# Patient Record
Sex: FEMALE | Race: WHITE | NOT HISPANIC OR LATINO | Employment: FULL TIME | URBAN - METROPOLITAN AREA
[De-identification: names, ages, dates, MRNs, and addresses within clinical notes are randomized per-mention and may not be internally consistent; named-entity substitution may affect disease eponyms.]

---

## 2017-06-13 ENCOUNTER — GENERIC CONVERSION - ENCOUNTER (OUTPATIENT)
Dept: OTHER | Facility: OTHER | Age: 42
End: 2017-06-13

## 2018-04-24 ENCOUNTER — OFFICE VISIT (OUTPATIENT)
Dept: INTERNAL MEDICINE CLINIC | Facility: CLINIC | Age: 43
End: 2018-04-24
Payer: COMMERCIAL

## 2018-04-24 VITALS
RESPIRATION RATE: 16 BRPM | DIASTOLIC BLOOD PRESSURE: 60 MMHG | HEART RATE: 71 BPM | SYSTOLIC BLOOD PRESSURE: 100 MMHG | OXYGEN SATURATION: 99 % | HEIGHT: 60 IN | TEMPERATURE: 100.4 F | WEIGHT: 117 LBS | BODY MASS INDEX: 22.97 KG/M2

## 2018-04-24 DIAGNOSIS — Z13.29 SCREENING FOR THYROID DISORDER: ICD-10-CM

## 2018-04-24 DIAGNOSIS — R10.13 DYSPEPSIA: Primary | ICD-10-CM

## 2018-04-24 DIAGNOSIS — Z13.220 SCREENING, LIPID: ICD-10-CM

## 2018-04-24 PROCEDURE — 99214 OFFICE O/P EST MOD 30 MIN: CPT | Performed by: INTERNAL MEDICINE

## 2018-04-24 RX ORDER — OMEPRAZOLE 20 MG/1
20 CAPSULE, DELAYED RELEASE ORAL DAILY
Qty: 30 CAPSULE | Refills: 0 | Status: SHIPPED | OUTPATIENT
Start: 2018-04-24 | End: 2018-05-21 | Stop reason: ALTCHOICE

## 2018-04-24 RX ORDER — MOMETASONE FUROATE 50 UG/1
1 SPRAY, METERED NASAL DAILY
COMMUNITY
Start: 2016-03-31 | End: 2018-10-02 | Stop reason: ALTCHOICE

## 2018-04-24 NOTE — PROGRESS NOTES
Assessment/Plan:     Diagnoses and all orders for this visit:    Dyspepsia  -     Comprehensive metabolic panel; Future  -     CBC and differential; Future  -     Helicobacter Pylori,IgM; Future  -     omeprazole (PriLOSEC) 20 mg delayed release capsule; Take 1 capsule (20 mg total) by mouth daily    Screening, lipid  -     Lipid panel; Future    Screening for thyroid disorder  -     TSH, 3rd generation; Future    Other orders  -     mometasone (NASONEX) 50 mcg/act nasal spray; 1 spray into each nostril daily  -     pramoxine-hydrocortisone (ANALPRAM-HC) 1-1 % rectal cream;       Tasha Goodman was seen and examined in the office today  Please see HPI for details  She may have picked up something but continues with feelings of dyspepsia  I am going to have her take Omeprazole daily for about 4 weeks  Of note, she did have some relief with Zantac and Ranitidine as well  She does have a family history of colon cancer but fortunately had a Cscope in the last 3 years  She is also going to complete blood work as listed above  Otherwise no other changes were made  Subjective:      Patient ID: Madelyn Pereira is a 40 y o  female  Tasha Goodman is here today to discuss abdominal symptoms that started last week  To summarize, she reports symptoms started off with not feeling well overall as well as constipation  It led to one day of diffuse diarrhea as well as vomiting  That resolved but was left with waves of nausea, reflux symptoms, and bowels are irregular still  She reports no recent travel but was at a pediatrician's office and was not sure if she picked up something then  She denies significant NSAID use or other related complaints  See below  Abdominal Pain   This is a new problem  The current episode started in the past 7 days  The pain is located in the generalized abdominal region  The quality of the pain is burning  The abdominal pain radiates to the epigastric region   Associated symptoms include belching, constipation, diarrhea, nausea and vomiting  Pertinent negatives include no dysuria, fever, headaches, hematochezia, hematuria or melena  The pain is aggravated by belching and eating  She has tried H2 blockers for the symptoms  The treatment provided mild relief  There is no history of abdominal surgery, colon cancer, Crohn's disease, gallstones, GERD, irritable bowel syndrome, pancreatitis, PUD or ulcerative colitis  The following portions of the patient's history were reviewed and updated as appropriate: allergies, current medications, past family history, past medical history, past social history, past surgical history and problem list     Review of Systems   Constitutional: Negative for fever  Gastrointestinal: Positive for abdominal pain, constipation, diarrhea, nausea and vomiting  Negative for hematochezia and melena  Genitourinary: Negative for dysuria and hematuria  Neurological: Negative for headaches  Objective:      /60 (BP Location: Left arm, Patient Position: Sitting, Cuff Size: Adult)   Pulse 71   Temp 100 4 °F (38 °C) (Tympanic)   Resp 16   Ht 5' (1 524 m)   Wt 53 1 kg (117 lb)   SpO2 99%   BMI 22 85 kg/m²          Physical Exam   Constitutional: She is oriented to person, place, and time  She appears well-developed and well-nourished  HENT:   Head: Normocephalic and atraumatic  Eyes: Conjunctivae and EOM are normal    Neck: Neck supple  Cardiovascular: Normal rate, regular rhythm and normal heart sounds  No murmur heard  Pulmonary/Chest: Breath sounds normal  No respiratory distress  She exhibits no tenderness  Abdominal: Soft  Bowel sounds are normal  She exhibits no distension and no mass  There is no rebound and no guarding  Musculoskeletal: Normal range of motion  She exhibits no edema  Lymphadenopathy:     She has no cervical adenopathy  Neurological: She is alert and oriented to person, place, and time  Skin: Skin is warm and dry     Psychiatric: She has a normal mood and affect  Her behavior is normal  Judgment and thought content normal    Vitals reviewed

## 2018-04-25 ENCOUNTER — TELEPHONE (OUTPATIENT)
Dept: INTERNAL MEDICINE CLINIC | Facility: CLINIC | Age: 43
End: 2018-04-25

## 2018-04-25 DIAGNOSIS — R10.13 DYSPEPSIA: Primary | ICD-10-CM

## 2018-04-25 NOTE — TELEPHONE ENCOUNTER
Left message on patients voice mail regarding lab slip  Advised patient to return phone call regarding fax number to make sure it's a secure number

## 2018-05-02 ENCOUNTER — TELEPHONE (OUTPATIENT)
Dept: INTERNAL MEDICINE CLINIC | Facility: CLINIC | Age: 43
End: 2018-05-02

## 2018-05-02 NOTE — TELEPHONE ENCOUNTER
I did not see anything either  I will contact the lab tomorrow for the report  Patient stated that they are on my chart

## 2018-05-02 NOTE — TELEPHONE ENCOUNTER
Patient called stating that she seen her blood work on my chart, she would like to know the results  Patient states that her labs show that her lipids are high and some of her other test are low  Please advise  Thanks  Patient has appt with the GI doctor next week because she still has her symptoms

## 2018-05-21 ENCOUNTER — OFFICE VISIT (OUTPATIENT)
Dept: INTERNAL MEDICINE CLINIC | Facility: CLINIC | Age: 43
End: 2018-05-21
Payer: COMMERCIAL

## 2018-05-21 VITALS
SYSTOLIC BLOOD PRESSURE: 98 MMHG | TEMPERATURE: 99 F | HEIGHT: 60 IN | HEART RATE: 78 BPM | DIASTOLIC BLOOD PRESSURE: 60 MMHG | BODY MASS INDEX: 22.62 KG/M2 | OXYGEN SATURATION: 98 % | WEIGHT: 115.2 LBS | RESPIRATION RATE: 16 BRPM

## 2018-05-21 DIAGNOSIS — M54.50 ACUTE BILATERAL LOW BACK PAIN WITHOUT SCIATICA: Primary | ICD-10-CM

## 2018-05-21 PROCEDURE — 99213 OFFICE O/P EST LOW 20 MIN: CPT | Performed by: INTERNAL MEDICINE

## 2018-05-21 RX ORDER — METHOCARBAMOL 750 MG/1
750 TABLET, FILM COATED ORAL 3 TIMES DAILY
COMMUNITY
Start: 2018-05-20 | End: 2018-09-13 | Stop reason: ALTCHOICE

## 2018-05-21 RX ORDER — LIDOCAINE 50 MG/G
1 PATCH TOPICAL EVERY 24 HOURS
COMMUNITY
Start: 2018-05-20 | End: 2018-09-17

## 2018-05-21 RX ORDER — HYDROCORTISONE ACETATE 25 MG/1
SUPPOSITORY RECTAL
COMMUNITY
Start: 2018-02-01 | End: 2019-04-30

## 2018-05-21 RX ORDER — NORETHINDRONE ACETATE AND ETHINYL ESTRADIOL 1MG-20(21)
1 KIT ORAL
COMMUNITY
Start: 2018-05-01 | End: 2018-09-10 | Stop reason: ALTCHOICE

## 2018-05-21 NOTE — PROGRESS NOTES
Assessment/Plan:     Diagnoses and all orders for this visit:    Acute bilateral low back pain without sciatica    Other orders  -     norethindrone-ethinyl estradiol (JUNEL FE 1/20) 1-20 MG-MCG per tablet; Take 1 tablet by mouth  -     methocarbamol (ROBAXIN) 750 mg tablet; Take 750 mg by mouth Three times a day  -     hydrocortisone (ANUCORT-HC) 25 mg suppository;   -     lidocaine (LIDODERM) 5 %; Place 1 patch on the skin every 24 hours      Regla Manuel was seen and examined in the office today  IN terms of her back pain, she does not have worrisome symptoms  Straight leg test was negative  She does have some noted bogginess of the musculature  For the next 5 days, I asked she take Ibuprofen 2-3 x per day with food and Robaxin at bedtime  Continue with heat  If symptoms worsen or do not improve, I asked that she let me know  Otherwise no other changes were made  Subjective:      Patient ID: Sharonda Hathaway is a 40 y o  female  Regla Manuel is here today for back pain that started about 10 days ago  She reports typically it disappears after a few days  She reports noticing the back pain more prominent on the right side but can be present bilaterally  She did try Tylenol and Ibuprofen but has not been particularly consistent  She also reports traveling to [place] recently and did go on rides that jolted her  She also did ambulate a great deal but typically did okay during the day  She also reports going to the chiropractor  Back Pain   This is a new problem  The current episode started in the past 7 days  The problem occurs daily  The problem is unchanged  The pain is present in the lumbar spine, thoracic spine and gluteal  The pain radiates to the right thigh  The pain is worse during the day  The symptoms are aggravated by sitting (Anything doing too long)  Associated symptoms include numbness   Pertinent negatives include no abdominal pain, bladder incontinence, bowel incontinence, fever, headaches or tingling  She has tried NSAIDs, chiropractic manipulation and analgesics for the symptoms  The following portions of the patient's history were reviewed and updated as appropriate: allergies, current medications, past family history, past medical history, past social history, past surgical history and problem list     Review of Systems   Constitutional: Negative for fever  Gastrointestinal: Negative for abdominal pain and bowel incontinence  Genitourinary: Negative for bladder incontinence  Musculoskeletal: Positive for back pain  Neurological: Positive for numbness  Negative for tingling and headaches  Objective:      BP 98/60 (BP Location: Left arm, Patient Position: Sitting, Cuff Size: Adult)   Pulse 78   Temp 99 °F (37 2 °C) (Tympanic)   Resp 16   Ht 5' (1 524 m)   Wt 52 3 kg (115 lb 3 2 oz)   SpO2 98%   BMI 22 50 kg/m²          Physical Exam   Constitutional: She is oriented to person, place, and time  She appears well-developed and well-nourished  Pulmonary/Chest: Effort normal    Musculoskeletal: She exhibits tenderness  Lumbar back: She exhibits tenderness, edema and spasm  She exhibits no deformity and no laceration  Neurological: She is alert and oriented to person, place, and time  Skin: Skin is warm and dry  Psychiatric: She has a normal mood and affect  Her behavior is normal  Thought content normal    Vitals reviewed

## 2018-05-22 ENCOUNTER — TELEPHONE (OUTPATIENT)
Dept: INTERNAL MEDICINE CLINIC | Facility: CLINIC | Age: 43
End: 2018-05-22

## 2018-05-22 NOTE — TELEPHONE ENCOUNTER
Notified patient with providers instructions  Patient states that she is not having any symptoms  Advised patient to call our office if anything changes

## 2018-05-22 NOTE — TELEPHONE ENCOUNTER
Patient called stating that her urine results came in from [facility], she would like to know if you could please review them  Patients results are under care everywhere  If antibiotic needs to be called in please use [facility]   Thank you

## 2018-09-10 ENCOUNTER — OFFICE VISIT (OUTPATIENT)
Dept: INTERNAL MEDICINE CLINIC | Facility: CLINIC | Age: 43
End: 2018-09-10
Payer: COMMERCIAL

## 2018-09-10 VITALS
OXYGEN SATURATION: 97 % | HEIGHT: 60 IN | WEIGHT: 118 LBS | HEART RATE: 80 BPM | BODY MASS INDEX: 23.16 KG/M2 | SYSTOLIC BLOOD PRESSURE: 110 MMHG | DIASTOLIC BLOOD PRESSURE: 70 MMHG

## 2018-09-10 DIAGNOSIS — J30.9 ALLERGIC RHINITIS, UNSPECIFIED SEASONALITY, UNSPECIFIED TRIGGER: ICD-10-CM

## 2018-09-10 DIAGNOSIS — G44.89 OTHER HEADACHE SYNDROME: ICD-10-CM

## 2018-09-10 DIAGNOSIS — F41.1 GENERALIZED ANXIETY DISORDER: Primary | ICD-10-CM

## 2018-09-10 PROBLEM — M54.50 ACUTE BILATERAL LOW BACK PAIN WITHOUT SCIATICA: Status: RESOLVED | Noted: 2018-05-21 | Resolved: 2018-09-10

## 2018-09-10 PROCEDURE — 99213 OFFICE O/P EST LOW 20 MIN: CPT | Performed by: INTERNAL MEDICINE

## 2018-09-10 RX ORDER — CURCUMIN 100 %
1 POWDER (GRAM) MISCELLANEOUS DAILY
COMMUNITY
End: 2021-04-29

## 2018-09-10 RX ORDER — OMEPRAZOLE 20 MG/1
20 CAPSULE, DELAYED RELEASE ORAL DAILY
COMMUNITY
End: 2019-04-30

## 2018-09-10 RX ORDER — LORAZEPAM 0.5 MG/1
0.5 TABLET ORAL DAILY PRN
Qty: 15 TABLET | Refills: 0 | Status: SHIPPED | OUTPATIENT
Start: 2018-09-10 | End: 2018-09-28 | Stop reason: SDUPTHER

## 2018-09-10 NOTE — PROGRESS NOTES
Assessment/Plan:     Diagnoses and all orders for this visit:    Generalized anxiety disorder  -     LORazepam (ATIVAN) 0 5 mg tablet; Take 1 tablet (0 5 mg total) by mouth daily as needed for anxiety    Allergic rhinitis, unspecified seasonality, unspecified trigger    Other headache syndrome    Other orders  -     omeprazole (PriLOSEC) 20 mg delayed release capsule; Take 20 mg by mouth daily  -     fexofenadine (ALLEGRA) 30 MG tablet; Take 30 mg by mouth 2 (two) times a day  -     Probiotic Product (PROBIOTIC-10 PO); Take by mouth  -     Turmeric, Curcuma [place]a, ([place]) POWD; by Does not apply route      Daniel Dao was seen and examined in the office today  Examination was largely within normal limits  Recent imaging was reviewed as well  Her headache and symptoms may be multifactorial  She is going to try massage therapy and also see if counseling helps her underlying anxiety  She was also given a script for Ativan to hang onto  She has had this previously in the past   She is going to see if the Medrol dose helps any of her symtpoms  We did discuss if this was possibly allergy related, we could possibly try other options  Otherwise no other changes were made  Subjective:      Patient ID: Shadi Buitrago is a 40 y o  female  Daniel Dao is here today to discuss some on going recent symptoms  She reports that she had noticed right sided head pressure and symptoms  This prompted a visit to an urgent care and was diagnosed with an ear infection  She completed her antibiotic course but continued with right sided head pressure and shooting pain at times  She reports previous headaches but this was different  She was unsure if this was allergy or sinus related  She denies hearing loss, persistent N/V, or lightheadedness  She does report feeling ill the day of the ED visit with associated numbness/tingling  She feels it may have been related to her anxiety as well    She continues to feel the head pressure on the right with dental pain as well as pain with chewing at times  She was seen by ENT this morning as well  She was started on a Medrol dose pack  The following portions of the patient's history were reviewed and updated as appropriate: allergies, current medications, past family history, past medical history, past social history, past surgical history and problem list     Review of Systems   Constitutional: Positive for fatigue  Negative for fever and unexpected weight change  HENT: Positive for sinus pain and sinus pressure  Negative for trouble swallowing and voice change  Respiratory: Negative for cough, shortness of breath, wheezing and stridor  Cardiovascular: Negative for chest pain, palpitations and leg swelling  Gastrointestinal: Negative for abdominal pain, diarrhea, nausea and vomiting  Musculoskeletal: Negative for arthralgias  Neurological: Positive for headaches  Negative for syncope  Psychiatric/Behavioral: Negative for confusion and dysphoric mood  The patient is nervous/anxious  Objective:      /70 (BP Location: Left arm, Patient Position: Sitting, Cuff Size: Standard)   Pulse 80   Ht 5' (1 524 m)   Wt 53 5 kg (118 lb)   SpO2 97%   BMI 23 05 kg/m²          Physical Exam   Constitutional: She is oriented to person, place, and time  She appears well-developed and well-nourished  No distress  HENT:   Head: Normocephalic and atraumatic  Mouth/Throat: Oropharynx is clear and moist    Eyes: Conjunctivae and EOM are normal  Pupils are equal, round, and reactive to light  Right eye exhibits no discharge  Left eye exhibits no discharge  No scleral icterus  Neck: Normal range of motion  No thyromegaly present  Cardiovascular: Normal rate, regular rhythm and normal heart sounds  No murmur heard  Pulmonary/Chest: Effort normal and breath sounds normal  No respiratory distress  She has no wheezes  Abdominal: Soft  Bowel sounds are normal  There is no tenderness   There is no rebound  Musculoskeletal: Normal range of motion  She exhibits no edema  Lymphadenopathy:     She has no cervical adenopathy  Neurological: She is alert and oriented to person, place, and time  She has normal reflexes  No cranial nerve deficit  Skin: Skin is warm and dry  She is not diaphoretic  No erythema  Psychiatric: She has a normal mood and affect  Her speech is normal and behavior is normal  Judgment and thought content normal    Vitals reviewed

## 2018-09-12 ENCOUNTER — TELEPHONE (OUTPATIENT)
Dept: INTERNAL MEDICINE CLINIC | Facility: CLINIC | Age: 43
End: 2018-09-12

## 2018-09-12 NOTE — TELEPHONE ENCOUNTER
Pt started steroids for past 3 days  Headaches continue to point of interfering with activities  PT still in pain and frustrated   Would appreciate a call for advice prior to order additional meds  [phone]   TY

## 2018-09-13 DIAGNOSIS — J30.9 ALLERGIC RHINITIS, UNSPECIFIED SEASONALITY, UNSPECIFIED TRIGGER: Primary | ICD-10-CM

## 2018-09-13 RX ORDER — MONTELUKAST SODIUM 10 MG/1
10 TABLET ORAL
Qty: 30 TABLET | Refills: 1 | Status: SHIPPED | OUTPATIENT
Start: 2018-09-13 | End: 2018-10-02 | Stop reason: ALTCHOICE

## 2018-09-13 NOTE — TELEPHONE ENCOUNTER
Kirk Marquez called back saying she is returning a call from you  She asked if you can call her back at your convienance at [phone]

## 2018-09-14 ENCOUNTER — TELEPHONE (OUTPATIENT)
Dept: INTERNAL MEDICINE CLINIC | Facility: CLINIC | Age: 43
End: 2018-09-14

## 2018-09-14 NOTE — TELEPHONE ENCOUNTER
That is fine if she would like to start off with ortho and if need be, we can send to neuro too after

## 2018-09-14 NOTE — TELEPHONE ENCOUNTER
Gabe Dial called back and states she spoke with you last night and wants to let you know what she has decided in regards to next step with her headaches   she decided she will see her spine specialist at 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 And said she has seen Dr Pat Grace there in the past and wants to know if you are okay with her seeing him or should she see a neurologist? I told her she may not receive a call back until Monday from you  She can be reached at [phone]

## 2018-09-17 ENCOUNTER — TELEPHONE (OUTPATIENT)
Dept: INTERNAL MEDICINE CLINIC | Facility: CLINIC | Age: 43
End: 2018-09-17

## 2018-09-17 ENCOUNTER — OFFICE VISIT (OUTPATIENT)
Dept: INTERNAL MEDICINE CLINIC | Facility: CLINIC | Age: 43
End: 2018-09-17
Payer: COMMERCIAL

## 2018-09-17 ENCOUNTER — APPOINTMENT (OUTPATIENT)
Dept: LAB | Facility: CLINIC | Age: 43
End: 2018-09-17
Payer: COMMERCIAL

## 2018-09-17 VITALS
HEIGHT: 60 IN | OXYGEN SATURATION: 98 % | HEART RATE: 82 BPM | BODY MASS INDEX: 22.38 KG/M2 | WEIGHT: 114 LBS | SYSTOLIC BLOOD PRESSURE: 110 MMHG | DIASTOLIC BLOOD PRESSURE: 68 MMHG

## 2018-09-17 DIAGNOSIS — G44.89 OTHER HEADACHE SYNDROME: Primary | ICD-10-CM

## 2018-09-17 DIAGNOSIS — F41.1 GENERALIZED ANXIETY DISORDER: ICD-10-CM

## 2018-09-17 DIAGNOSIS — G44.89 OTHER HEADACHE SYNDROME: ICD-10-CM

## 2018-09-17 DIAGNOSIS — H93.13 TINNITUS OF BOTH EARS: ICD-10-CM

## 2018-09-17 LAB
ALBUMIN SERPL BCP-MCNC: 4.2 G/DL (ref 3.5–5)
ALP SERPL-CCNC: 33 U/L (ref 46–116)
ALT SERPL W P-5'-P-CCNC: 23 U/L (ref 12–78)
ANION GAP SERPL CALCULATED.3IONS-SCNC: 6 MMOL/L (ref 4–13)
AST SERPL W P-5'-P-CCNC: 13 U/L (ref 5–45)
BASOPHILS # BLD AUTO: 0.04 THOUSANDS/ΜL (ref 0–0.1)
BASOPHILS NFR BLD AUTO: 1 % (ref 0–1)
BILIRUB SERPL-MCNC: 0.46 MG/DL (ref 0.2–1)
BUN SERPL-MCNC: 9 MG/DL (ref 5–25)
CALCIUM SERPL-MCNC: 9 MG/DL (ref 8.3–10.1)
CHLORIDE SERPL-SCNC: 101 MMOL/L (ref 100–108)
CO2 SERPL-SCNC: 27 MMOL/L (ref 21–32)
CREAT SERPL-MCNC: 0.89 MG/DL (ref 0.6–1.3)
EOSINOPHIL # BLD AUTO: 0.07 THOUSAND/ΜL (ref 0–0.61)
EOSINOPHIL NFR BLD AUTO: 1 % (ref 0–6)
ERYTHROCYTE [DISTWIDTH] IN BLOOD BY AUTOMATED COUNT: 12 % (ref 11.6–15.1)
ERYTHROCYTE [SEDIMENTATION RATE] IN BLOOD: 12 MM/HOUR (ref 0–20)
GFR SERPL CREATININE-BSD FRML MDRD: 81 ML/MIN/1.73SQ M
GLUCOSE SERPL-MCNC: 98 MG/DL (ref 65–140)
HCT VFR BLD AUTO: 39.7 % (ref 34.8–46.1)
HGB BLD-MCNC: 13.1 G/DL (ref 11.5–15.4)
IMM GRANULOCYTES # BLD AUTO: 0.01 THOUSAND/UL (ref 0–0.2)
IMM GRANULOCYTES NFR BLD AUTO: 0 % (ref 0–2)
LYMPHOCYTES # BLD AUTO: 1.74 THOUSANDS/ΜL (ref 0.6–4.47)
LYMPHOCYTES NFR BLD AUTO: 34 % (ref 14–44)
MCH RBC QN AUTO: 31.2 PG (ref 26.8–34.3)
MCHC RBC AUTO-ENTMCNC: 33 G/DL (ref 31.4–37.4)
MCV RBC AUTO: 95 FL (ref 82–98)
MONOCYTES # BLD AUTO: 0.58 THOUSAND/ΜL (ref 0.17–1.22)
MONOCYTES NFR BLD AUTO: 11 % (ref 4–12)
NEUTROPHILS # BLD AUTO: 2.75 THOUSANDS/ΜL (ref 1.85–7.62)
NEUTS SEG NFR BLD AUTO: 53 % (ref 43–75)
NRBC BLD AUTO-RTO: 0 /100 WBCS
PLATELET # BLD AUTO: 307 THOUSANDS/UL (ref 149–390)
PMV BLD AUTO: 10.4 FL (ref 8.9–12.7)
POTASSIUM SERPL-SCNC: 4 MMOL/L (ref 3.5–5.3)
PROT SERPL-MCNC: 7.7 G/DL (ref 6.4–8.2)
RBC # BLD AUTO: 4.2 MILLION/UL (ref 3.81–5.12)
SODIUM SERPL-SCNC: 134 MMOL/L (ref 136–145)
TSH SERPL DL<=0.05 MIU/L-ACNC: 1.91 UIU/ML (ref 0.36–3.74)
WBC # BLD AUTO: 5.19 THOUSAND/UL (ref 4.31–10.16)

## 2018-09-17 PROCEDURE — 80053 COMPREHEN METABOLIC PANEL: CPT | Performed by: INTERNAL MEDICINE

## 2018-09-17 PROCEDURE — 85025 COMPLETE CBC W/AUTO DIFF WBC: CPT | Performed by: INTERNAL MEDICINE

## 2018-09-17 PROCEDURE — 84443 ASSAY THYROID STIM HORMONE: CPT | Performed by: INTERNAL MEDICINE

## 2018-09-17 PROCEDURE — 36415 COLL VENOUS BLD VENIPUNCTURE: CPT | Performed by: INTERNAL MEDICINE

## 2018-09-17 PROCEDURE — 85652 RBC SED RATE AUTOMATED: CPT

## 2018-09-17 PROCEDURE — 99213 OFFICE O/P EST LOW 20 MIN: CPT | Performed by: INTERNAL MEDICINE

## 2018-09-17 PROCEDURE — 3008F BODY MASS INDEX DOCD: CPT | Performed by: INTERNAL MEDICINE

## 2018-09-17 RX ORDER — METRONIDAZOLE 7.5 MG/G
GEL VAGINAL
COMMUNITY
Start: 2018-07-30 | End: 2018-10-02 | Stop reason: ALTCHOICE

## 2018-09-17 RX ORDER — METHOCARBAMOL 750 MG/1
TABLET, FILM COATED ORAL
COMMUNITY
Start: 2018-09-15 | End: 2018-10-02 | Stop reason: ALTCHOICE

## 2018-09-17 RX ORDER — AMITRIPTYLINE HYDROCHLORIDE 10 MG/1
10 TABLET, FILM COATED ORAL
Qty: 30 TABLET | Refills: 0 | Status: SHIPPED | OUTPATIENT
Start: 2018-09-17 | End: 2018-10-02 | Stop reason: SDUPTHER

## 2018-09-17 RX ORDER — NAPROXEN 500 MG/1
TABLET ORAL
COMMUNITY
Start: 2018-09-15 | End: 2020-10-09

## 2018-09-17 NOTE — PROGRESS NOTES
Assessment/Plan:     Diagnoses and all orders for this visit:    Other headache syndrome  -     MRI brain w wo mra head wo mra neck w wo; Future  -     Comprehensive metabolic panel  -     CBC and differential  -     TSH, 3rd generation with Free T4 reflex  -     Sedimentation rate, automated; Future  -     amitriptyline (ELAVIL) 10 mg tablet; Take 1 tablet (10 mg total) by mouth daily at bedtime    Generalized anxiety disorder    Tinnitus of both ears    Other orders  -     methocarbamol (ROBAXIN) 750 mg tablet;   -     metroNIDAZOLE (METROGEL) 0 75 % vaginal gel;   -     naproxen (NAPROSYN) 500 mg tablet;       Daneil Dao was seen and examined in the office today  On exam, she notes subjective numbness of the right face  She has had worsening headaches and I did order follow up testing (also has tinnitus)  Neck was included as well  We also discussed possibly doing a trial of Elavil and this was sent  Will follow  Subjective:      Patient ID: Shadi Buitrago is a 40 y o  female  Daniel Dao is here today with continuing headaches that are worsening  Please see previous notes for details  They have led to 2 ED visits, last of which was over the weekend  She reports worsening right sided neck/head pressure that radiates to the front  She feels dental pressure and pain  She feels numbness on this side overall  At times, she notes N/T of the arms but will feel it bilaterally  She has tried multiple things and recently had a trigger point in the ED  She felt some relief in the neck but symptoms returned  She reports daily headaches that seem to get worse as the day progresses  She is feeling much anxiety secondary to this as this is not normal for her  Migraine    This is a new problem  The current episode started 1 to 4 weeks ago  The problem occurs daily  The problem has been gradually worsening  The pain is located in the right unilateral region  The pain radiates to the right neck   The pain quality is not similar to prior headaches  The quality of the pain is described as stabbing and throbbing  Associated symptoms include anorexia, neck pain, numbness, tingling and tinnitus  Pertinent negatives include no abdominal pain, abnormal behavior, back pain, blurred vision, coughing, dizziness, drainage, ear pain, eye pain, eye redness, eye watering, facial sweating, fever, hearing loss, muscle aches, nausea, scalp tenderness, sinus pressure, sore throat or weight loss  The symptoms are aggravated by unknown  She has tried NSAIDs for the symptoms  The treatment provided mild relief  Her past medical history is significant for migraine headaches  There is no history of cancer, cluster headaches, hypertension, recent head traumas or TMJ  The following portions of the patient's history were reviewed and updated as appropriate: allergies, current medications, past family history, past medical history, past social history, past surgical history and problem list     Review of Systems   Constitutional: Negative for fever and weight loss  HENT: Positive for tinnitus  Negative for ear pain, hearing loss, sinus pressure and sore throat  Eyes: Negative for blurred vision, pain and redness  Respiratory: Negative for cough  Gastrointestinal: Positive for anorexia  Negative for abdominal pain and nausea  Musculoskeletal: Positive for neck pain  Negative for back pain  Neurological: Positive for tingling and numbness  Negative for dizziness  Objective:      /68 (BP Location: Left arm, Patient Position: Sitting, Cuff Size: Standard)   Pulse 82   Ht 5' (1 524 m)   Wt 51 7 kg (114 lb)   SpO2 98%   BMI 22 26 kg/m²          Physical Exam   Constitutional: She is oriented to person, place, and time  She appears well-developed and well-nourished  No distress  HENT:   Head: Normocephalic and atraumatic     Right Ear: External ear normal    Left Ear: External ear normal    Mouth/Throat: Oropharynx is clear and moist    Eyes: Conjunctivae and EOM are normal  Pupils are equal, round, and reactive to light  Right eye exhibits no discharge  Left eye exhibits no discharge  Neck: Normal range of motion  Neck supple  No JVD present  No tracheal deviation present  No thyromegaly present  Cardiovascular: Normal rate, regular rhythm and normal heart sounds  Pulmonary/Chest: Effort normal and breath sounds normal  No respiratory distress  She has no wheezes  Musculoskeletal: Normal range of motion  Lymphadenopathy:     She has no cervical adenopathy  Neurological: She is alert and oriented to person, place, and time  She has normal reflexes  No cranial nerve deficit  Skin: Skin is warm and dry  She is not diaphoretic  Psychiatric: She has a normal mood and affect  Her speech is normal and behavior is normal  Judgment and thought content normal    Vitals reviewed

## 2018-09-17 NOTE — TELEPHONE ENCOUNTER
PT Forgot to mention that she needs a PCP note to her employer recommending the need for a stand-up desk and ergonomic chair for medical reasons   Call when available or e-mail

## 2018-09-18 ENCOUNTER — TELEPHONE (OUTPATIENT)
Dept: INTERNAL MEDICINE CLINIC | Facility: CLINIC | Age: 43
End: 2018-09-18

## 2018-09-18 NOTE — TELEPHONE ENCOUNTER
Could you print something like this for me on letterhead? And then we can let Patricia Abebe know we have it done  To Whom It May Concern,    Erikagaelmer Rich is under my medical care  I believe it is in her best interest to have a stand-up desk and ergonomic chair for medical reasons  If there are any questions or concerns, please do not hesitate to call  Sincerely,      Cristiano ARORA

## 2018-09-18 NOTE — TELEPHONE ENCOUNTER
----- Message from Jada Reynaga DO sent at 9/18/2018  1:54 PM EDT -----  Could you let Regla Kaleb know that her blood work is fine

## 2018-09-19 ENCOUNTER — TELEPHONE (OUTPATIENT)
Dept: INTERNAL MEDICINE CLINIC | Facility: CLINIC | Age: 43
End: 2018-09-19

## 2018-09-19 DIAGNOSIS — M54.10 RADICULOPATHY, UNSPECIFIED SPINAL REGION: Primary | ICD-10-CM

## 2018-09-19 NOTE — TELEPHONE ENCOUNTER
Mel Lancaster is asking for a call back  She was looking at her lab results and noticed some things were off and she would like to discuss  She is also checking the status of her MRI and wanted to know if it was approved

## 2018-09-20 ENCOUNTER — TELEPHONE (OUTPATIENT)
Dept: INTERNAL MEDICINE CLINIC | Facility: CLINIC | Age: 43
End: 2018-09-20

## 2018-09-20 ENCOUNTER — APPOINTMENT (OUTPATIENT)
Dept: RADIOLOGY | Facility: MEDICAL CENTER | Age: 43
End: 2018-09-20
Payer: COMMERCIAL

## 2018-09-20 DIAGNOSIS — M54.10 RADICULOPATHY, UNSPECIFIED SPINAL REGION: ICD-10-CM

## 2018-09-20 PROCEDURE — 72050 X-RAY EXAM NECK SPINE 4/5VWS: CPT

## 2018-09-20 NOTE — TELEPHONE ENCOUNTER
Ok thanks  Can we make sure that its not just the vasculature and they cover the bony structures too? (ie herniated discs)- sorry if you already meant that!

## 2018-09-20 NOTE — TELEPHONE ENCOUNTER
----- Message from Kenyetta Kiser DO sent at 9/20/2018 11:20 AM EDT -----  Could you let Ansley Westbrook know that her xray looks generally okay  Nothing worrisome on this

## 2018-09-20 NOTE — TELEPHONE ENCOUNTER
Patient wanted to note tat she was recently treated for an ear infection   She was told she had a lot of fluid build up be

## 2018-09-21 DIAGNOSIS — M54.12 CERVICAL RADICULOPATHY: Primary | ICD-10-CM

## 2018-09-21 NOTE — TELEPHONE ENCOUNTER
[address] but I ended up putting in  Mri cervical neck too  Her symptoms seem to be involving her neck too woth radiation down her arm   If her test does not get approved, I would take our mra

## 2018-09-25 DIAGNOSIS — M54.12 RADICULOPATHY, CERVICAL REGION: ICD-10-CM

## 2018-09-25 DIAGNOSIS — R51.9 INTRACTABLE HEADACHE, UNSPECIFIED CHRONICITY PATTERN, UNSPECIFIED HEADACHE TYPE: Primary | ICD-10-CM

## 2018-09-28 DIAGNOSIS — F41.1 GENERALIZED ANXIETY DISORDER: ICD-10-CM

## 2018-09-28 RX ORDER — LORAZEPAM 0.5 MG/1
0.5 TABLET ORAL DAILY PRN
Qty: 15 TABLET | Refills: 0 | Status: SHIPPED | OUTPATIENT
Start: 2018-09-28 | End: 2019-04-24 | Stop reason: SDUPTHER

## 2018-09-28 NOTE — TELEPHONE ENCOUNTER
Patient is requesting a refill on lorazepam  She said with her headaches this is the only thing that helps her sleep  Patient received results for MRI  I have it in your folder for review  Patiet would like to regroup  She said she can come in for an appointment or a phone call   Which ever work for you

## 2018-10-02 ENCOUNTER — LAB (OUTPATIENT)
Dept: LAB | Facility: CLINIC | Age: 43
End: 2018-10-02
Payer: COMMERCIAL

## 2018-10-02 ENCOUNTER — OFFICE VISIT (OUTPATIENT)
Dept: INTERNAL MEDICINE CLINIC | Facility: CLINIC | Age: 43
End: 2018-10-02
Payer: COMMERCIAL

## 2018-10-02 VITALS
BODY MASS INDEX: 21.99 KG/M2 | SYSTOLIC BLOOD PRESSURE: 118 MMHG | HEART RATE: 92 BPM | WEIGHT: 112 LBS | HEIGHT: 60 IN | DIASTOLIC BLOOD PRESSURE: 72 MMHG | OXYGEN SATURATION: 98 %

## 2018-10-02 DIAGNOSIS — G44.89 OTHER HEADACHE SYNDROME: ICD-10-CM

## 2018-10-02 DIAGNOSIS — R59.9 LYMPH NODE ENLARGEMENT: ICD-10-CM

## 2018-10-02 DIAGNOSIS — G44.89 OTHER HEADACHE SYNDROME: Primary | ICD-10-CM

## 2018-10-02 LAB
ALBUMIN SERPL BCP-MCNC: 3.9 G/DL (ref 3.5–5)
ALP SERPL-CCNC: 40 U/L (ref 46–116)
ALT SERPL W P-5'-P-CCNC: 18 U/L (ref 12–78)
ANION GAP SERPL CALCULATED.3IONS-SCNC: 5 MMOL/L (ref 4–13)
AST SERPL W P-5'-P-CCNC: 12 U/L (ref 5–45)
BASOPHILS # BLD AUTO: 0.02 THOUSANDS/ΜL (ref 0–0.1)
BASOPHILS NFR BLD AUTO: 0 % (ref 0–1)
BILIRUB SERPL-MCNC: 0.36 MG/DL (ref 0.2–1)
BUN SERPL-MCNC: 9 MG/DL (ref 5–25)
CALCIUM SERPL-MCNC: 8.5 MG/DL (ref 8.3–10.1)
CHLORIDE SERPL-SCNC: 103 MMOL/L (ref 100–108)
CO2 SERPL-SCNC: 29 MMOL/L (ref 21–32)
CREAT SERPL-MCNC: 0.81 MG/DL (ref 0.6–1.3)
EOSINOPHIL # BLD AUTO: 0.1 THOUSAND/ΜL (ref 0–0.61)
EOSINOPHIL NFR BLD AUTO: 2 % (ref 0–6)
ERYTHROCYTE [DISTWIDTH] IN BLOOD BY AUTOMATED COUNT: 11.7 % (ref 11.6–15.1)
ERYTHROCYTE [SEDIMENTATION RATE] IN BLOOD: 8 MM/HOUR (ref 0–20)
GFR SERPL CREATININE-BSD FRML MDRD: 90 ML/MIN/1.73SQ M
GLUCOSE P FAST SERPL-MCNC: 74 MG/DL (ref 65–99)
HCT VFR BLD AUTO: 39.5 % (ref 34.8–46.1)
HGB BLD-MCNC: 13 G/DL (ref 11.5–15.4)
IMM GRANULOCYTES # BLD AUTO: 0.02 THOUSAND/UL (ref 0–0.2)
IMM GRANULOCYTES NFR BLD AUTO: 0 % (ref 0–2)
LYMPHOCYTES # BLD AUTO: 1.44 THOUSANDS/ΜL (ref 0.6–4.47)
LYMPHOCYTES NFR BLD AUTO: 23 % (ref 14–44)
MCH RBC QN AUTO: 31.3 PG (ref 26.8–34.3)
MCHC RBC AUTO-ENTMCNC: 32.9 G/DL (ref 31.4–37.4)
MCV RBC AUTO: 95 FL (ref 82–98)
MONOCYTES # BLD AUTO: 0.48 THOUSAND/ΜL (ref 0.17–1.22)
MONOCYTES NFR BLD AUTO: 8 % (ref 4–12)
NEUTROPHILS # BLD AUTO: 4.22 THOUSANDS/ΜL (ref 1.85–7.62)
NEUTS SEG NFR BLD AUTO: 67 % (ref 43–75)
NRBC BLD AUTO-RTO: 0 /100 WBCS
PLATELET # BLD AUTO: 278 THOUSANDS/UL (ref 149–390)
PMV BLD AUTO: 11.3 FL (ref 8.9–12.7)
POTASSIUM SERPL-SCNC: 4.4 MMOL/L (ref 3.5–5.3)
PROT SERPL-MCNC: 7.3 G/DL (ref 6.4–8.2)
RBC # BLD AUTO: 4.16 MILLION/UL (ref 3.81–5.12)
SODIUM SERPL-SCNC: 137 MMOL/L (ref 136–145)
WBC # BLD AUTO: 6.28 THOUSAND/UL (ref 4.31–10.16)

## 2018-10-02 PROCEDURE — 1036F TOBACCO NON-USER: CPT | Performed by: INTERNAL MEDICINE

## 2018-10-02 PROCEDURE — 85025 COMPLETE CBC W/AUTO DIFF WBC: CPT | Performed by: INTERNAL MEDICINE

## 2018-10-02 PROCEDURE — 36415 COLL VENOUS BLD VENIPUNCTURE: CPT | Performed by: INTERNAL MEDICINE

## 2018-10-02 PROCEDURE — 99213 OFFICE O/P EST LOW 20 MIN: CPT | Performed by: INTERNAL MEDICINE

## 2018-10-02 PROCEDURE — 86618 LYME DISEASE ANTIBODY: CPT

## 2018-10-02 PROCEDURE — 80053 COMPREHEN METABOLIC PANEL: CPT | Performed by: INTERNAL MEDICINE

## 2018-10-02 PROCEDURE — 85652 RBC SED RATE AUTOMATED: CPT

## 2018-10-02 RX ORDER — SUMATRIPTAN 25 MG/1
25 TABLET, FILM COATED ORAL ONCE AS NEEDED
Qty: 10 TABLET | Refills: 0 | Status: SHIPPED | OUTPATIENT
Start: 2018-10-02 | End: 2019-04-30

## 2018-10-02 RX ORDER — IBUPROFEN 200 MG
TABLET ORAL EVERY 6 HOURS PRN
COMMUNITY
End: 2020-10-09 | Stop reason: ALTCHOICE

## 2018-10-02 RX ORDER — CHOLECALCIFEROL (VITAMIN D3) 50 MCG
2000 TABLET ORAL DAILY
COMMUNITY
End: 2019-07-09 | Stop reason: ALTCHOICE

## 2018-10-02 RX ORDER — AMITRIPTYLINE HYDROCHLORIDE 10 MG/1
20 TABLET, FILM COATED ORAL
Qty: 60 TABLET | Refills: 0 | Status: SHIPPED | OUTPATIENT
Start: 2018-10-02 | End: 2019-07-17

## 2018-10-02 NOTE — PROGRESS NOTES
Assessment/Plan:     Diagnoses and all orders for this visit:    Other headache syndrome  -     amitriptyline (ELAVIL) 10 mg tablet; Take 2 tablets (20 mg total) by mouth daily at bedtime  -     SUMAtriptan (IMITREX) 25 mg tablet; Take 1 tablet (25 mg total) by mouth once as needed for migraine for up to 1 dose  -     Comprehensive metabolic panel  -     CBC and differential  -     Sedimentation rate, automated; Future  -     Lyme Antibody Profile with reflex to WB; Future    MRI was reviewed as well today  She continues to be plagued and we discussed moving up on the Elavil  I am also going to have her try Imitrex to see if this helps as well  She was also participating in PT but feels this is worsening things  She also does have Neurology follow up scheduled in the next few weeks  Lymph node enlargement  -     US head neck soft tissue; Future  This may be reactive in nature but has pain over this area as well  Other orders  -     Cholecalciferol (VITAMIN D) 2000 units tablet; Take 2,000 Units by mouth daily  -     ibuprofen (MOTRIN) 200 mg tablet; Take by mouth every 6 (six) hours as needed for mild pain        Subjective:      Patient ID: Reginaldo Arreola is a 40 y o  female  Fawadharshad Romeo is here today for a check up on her recent symptoms  Please see previous notes for additional detail  She continues to be bothered by right sided neck/occipital headaches that at times radiate to the left and down both her arms  At times it is pulsating  She has had a nerve block in the ED with some relief  She had a CT head completed as well as a recent MRI  Fortunately, there are no acute findings but does show some debris in the mastoid region  She reports right sided lymph node area pain/mastoid pain  She denies fevers, chills, confusion  She reports night sweats that she notes  She just does not feel herself           The following portions of the patient's history were reviewed and updated as appropriate: allergies, current medications, past family history, past medical history, past social history, past surgical history and problem list     Review of Systems   Constitutional: Positive for fatigue  Negative for chills, fever and unexpected weight change  HENT: Positive for ear pain  Negative for sinus pain and sinus pressure  Eyes: Negative for visual disturbance  Respiratory: Negative for cough, chest tightness, shortness of breath and wheezing  Cardiovascular: Negative for chest pain, palpitations and leg swelling  Gastrointestinal: Negative for abdominal pain, blood in stool, constipation, diarrhea, nausea and vomiting  Musculoskeletal: Positive for arthralgias  Negative for back pain and myalgias  Neurological: Positive for light-headedness, numbness and headaches  Negative for syncope  Psychiatric/Behavioral: Positive for sleep disturbance  Negative for dysphoric mood  The patient is not nervous/anxious  Objective:      /72 (BP Location: Left arm, Patient Position: Sitting, Cuff Size: Standard)   Pulse 92   Ht 5' (1 524 m)   Wt 50 8 kg (112 lb)   SpO2 98%   BMI 21 87 kg/m²          Physical Exam   Constitutional: She is oriented to person, place, and time  She appears well-developed and well-nourished  No distress  HENT:   Head: Normocephalic and atraumatic  Right Ear: External ear normal    Left Ear: External ear normal    Mouth/Throat: Oropharynx is clear and moist    Eyes: Conjunctivae and EOM are normal  Right eye exhibits no discharge  Left eye exhibits no discharge  No scleral icterus  Neck: Normal range of motion  No tracheal deviation present  No thyromegaly present  Cardiovascular: Normal rate, regular rhythm and normal heart sounds  No murmur heard  Pulmonary/Chest: Effort normal and breath sounds normal  No respiratory distress  She has no wheezes  She exhibits no tenderness  Musculoskeletal: Normal range of motion  She exhibits no edema     Lymphadenopathy:     She has cervical adenopathy  Neurological: She is alert and oriented to person, place, and time  No cranial nerve deficit  Skin: Skin is warm and dry  She is not diaphoretic  No erythema  Psychiatric: She has a normal mood and affect  Her speech is normal and behavior is normal  Judgment and thought content normal    Vitals reviewed

## 2018-10-03 ENCOUNTER — HOSPITAL ENCOUNTER (OUTPATIENT)
Dept: ULTRASOUND IMAGING | Facility: HOSPITAL | Age: 43
Discharge: HOME | End: 2018-10-03
Attending: INTERNAL MEDICINE
Payer: COMMERCIAL

## 2018-10-03 DIAGNOSIS — R59.9 LYMPH NODE ENLARGEMENT: ICD-10-CM

## 2018-10-03 LAB
B BURGDOR IGG SER IA-ACNC: 0.61
B BURGDOR IGM SER IA-ACNC: 0.16

## 2018-10-03 PROCEDURE — 76536 US EXAM OF HEAD AND NECK: CPT

## 2018-10-04 ENCOUNTER — TELEPHONE (OUTPATIENT)
Dept: INTERNAL MEDICINE CLINIC | Facility: CLINIC | Age: 43
End: 2018-10-04

## 2018-10-04 NOTE — TELEPHONE ENCOUNTER
Updated patient on labs  Patient wanted to know status of mri spine  I called [facility] and they said this is in a peer to peer review  They need a call at [phone]  Please call to speak to a dr and give them reference number [id number]

## 2018-10-11 NOTE — TELEPHONE ENCOUNTER
I spoke with [facility]  I changed the fax and updated address also  The women gave me some interesting information so they are putting the auth through again and should be approved  We will find out by Monday

## 2018-10-11 NOTE — TELEPHONE ENCOUNTER
Can you call them back because she wont discuss the case with me because she said the address or fax do not match and I have no where else to go with it

## 2019-02-07 ENCOUNTER — TELEPHONE (OUTPATIENT)
Dept: FAMILY MEDICINE CLINIC | Facility: CLINIC | Age: 44
End: 2019-02-07

## 2019-02-07 DIAGNOSIS — IMO0002 CHRONIC MIGRAINE: Primary | ICD-10-CM

## 2019-02-07 NOTE — TELEPHONE ENCOUNTER
Reached out to pt to f/u and see how she is doing with her headaches  Pt states she has been seeing [facility] neuro headache clinic but has not gotten very far on treatment  They have wanted to try several things including PT and a different medication as the imitrex has not been working, but neither were covered by insurance and pt states she just feels like things are going in circles  Pt states she did have some relief with nerve block/trigger point injections, but relief is temporary  States that she still has 5-6 days a month she is in bed  Pt states she also decided to try seeing a DO who does craniosacral adjustments/neuromuscular tx's on her own because she just feels like nothing is really working  Pt states she has some relief with this but also temporary  Pt wonders if you have any other recommendations, and/or if she can have a referral to  neuro to get a second opinion about treatment

## 2019-02-08 ENCOUNTER — TELEPHONE (OUTPATIENT)
Dept: FAMILY MEDICINE CLINIC | Facility: CLINIC | Age: 44
End: 2019-02-08

## 2019-02-08 NOTE — TELEPHONE ENCOUNTER
----- Message from Arielle Resendiz DO sent at 2/7/2019 11:37 AM EST -----  Just wanted to check in and see how she is doing with her headaches?

## 2019-04-24 DIAGNOSIS — F41.1 GENERALIZED ANXIETY DISORDER: ICD-10-CM

## 2019-04-24 RX ORDER — LORAZEPAM 0.5 MG/1
0.5 TABLET ORAL DAILY PRN
Qty: 15 TABLET | Refills: 0 | Status: SHIPPED | OUTPATIENT
Start: 2019-04-24 | End: 2019-06-18 | Stop reason: SDUPTHER

## 2019-04-30 ENCOUNTER — OFFICE VISIT (OUTPATIENT)
Dept: FAMILY MEDICINE CLINIC | Facility: CLINIC | Age: 44
End: 2019-04-30
Payer: COMMERCIAL

## 2019-04-30 VITALS
HEIGHT: 60 IN | HEART RATE: 84 BPM | OXYGEN SATURATION: 97 % | SYSTOLIC BLOOD PRESSURE: 114 MMHG | WEIGHT: 113.4 LBS | DIASTOLIC BLOOD PRESSURE: 70 MMHG | BODY MASS INDEX: 22.26 KG/M2 | RESPIRATION RATE: 18 BRPM

## 2019-04-30 DIAGNOSIS — F41.1 GENERALIZED ANXIETY DISORDER: ICD-10-CM

## 2019-04-30 DIAGNOSIS — Z13.0 SCREENING FOR DEFICIENCY ANEMIA: ICD-10-CM

## 2019-04-30 DIAGNOSIS — Z00.00 ENCOUNTER FOR PHYSICAL EXAMINATION: Primary | ICD-10-CM

## 2019-04-30 DIAGNOSIS — Z13.220 SCREENING, LIPID: ICD-10-CM

## 2019-04-30 DIAGNOSIS — Z13.1 ENCOUNTER FOR SCREENING EXAMINATION FOR IMPAIRED GLUCOSE REGULATION AND DIABETES MELLITUS: ICD-10-CM

## 2019-04-30 DIAGNOSIS — G43.809 OTHER MIGRAINE WITHOUT STATUS MIGRAINOSUS, NOT INTRACTABLE: ICD-10-CM

## 2019-04-30 PROBLEM — G43.909 MIGRAINE HEADACHE: Status: ACTIVE | Noted: 2019-04-30

## 2019-04-30 PROBLEM — R59.9 LYMPH NODE ENLARGEMENT: Status: RESOLVED | Noted: 2018-10-02 | Resolved: 2019-04-30

## 2019-04-30 PROBLEM — G44.89 OTHER HEADACHE SYNDROME: Status: RESOLVED | Noted: 2018-09-10 | Resolved: 2019-04-30

## 2019-04-30 PROBLEM — Z80.0 FAMILY HISTORY OF COLON CANCER IN MOTHER: Status: ACTIVE | Noted: 2018-10-19

## 2019-04-30 PROBLEM — H93.13 TINNITUS OF BOTH EARS: Status: RESOLVED | Noted: 2018-09-17 | Resolved: 2019-04-30

## 2019-04-30 PROBLEM — R10.13 DYSPEPSIA: Status: RESOLVED | Noted: 2018-04-24 | Resolved: 2019-04-30

## 2019-04-30 PROCEDURE — 99396 PREV VISIT EST AGE 40-64: CPT | Performed by: INTERNAL MEDICINE

## 2019-04-30 RX ORDER — DIHYDROERGOTAMINE MESYLATE 4 MG/ML
1 SPRAY NASAL AS NEEDED
COMMUNITY
End: 2020-12-28

## 2019-05-17 ENCOUNTER — TELEPHONE (OUTPATIENT)
Dept: NEUROLOGY | Facility: CLINIC | Age: 44
End: 2019-05-17

## 2019-05-17 ENCOUNTER — CONSULT (OUTPATIENT)
Dept: NEUROLOGY | Facility: CLINIC | Age: 44
End: 2019-05-17
Payer: COMMERCIAL

## 2019-05-17 VITALS
HEIGHT: 60 IN | WEIGHT: 112.1 LBS | BODY MASS INDEX: 22.01 KG/M2 | SYSTOLIC BLOOD PRESSURE: 112 MMHG | HEART RATE: 93 BPM | DIASTOLIC BLOOD PRESSURE: 66 MMHG

## 2019-05-17 DIAGNOSIS — G43.009 MIGRAINE WITHOUT AURA AND WITHOUT STATUS MIGRAINOSUS, NOT INTRACTABLE: Primary | ICD-10-CM

## 2019-05-17 DIAGNOSIS — F41.1 GENERALIZED ANXIETY DISORDER: ICD-10-CM

## 2019-05-17 DIAGNOSIS — G24.3 CERVICAL DYSTONIA: ICD-10-CM

## 2019-05-17 PROBLEM — G43.909 MIGRAINE HEADACHE: Status: RESOLVED | Noted: 2019-04-30 | Resolved: 2019-05-17

## 2019-05-17 PROCEDURE — 99245 OFF/OP CONSLTJ NEW/EST HI 55: CPT | Performed by: PSYCHIATRY & NEUROLOGY

## 2019-06-06 ENCOUNTER — TELEPHONE (OUTPATIENT)
Dept: FAMILY MEDICINE CLINIC | Facility: CLINIC | Age: 44
End: 2019-06-06

## 2019-06-06 DIAGNOSIS — K58.9 IRRITABLE BOWEL SYNDROME, UNSPECIFIED TYPE: Primary | ICD-10-CM

## 2019-06-06 NOTE — TELEPHONE ENCOUNTER
Pt called stating her surgeon at [facility] had recommended she f/u with an IBS specialist in the [facility]  Pt previously was seeing GI at [facility] and would like to switch to     Okay to provide referral?

## 2019-06-07 ENCOUNTER — TELEPHONE (OUTPATIENT)
Dept: GASTROENTEROLOGY | Facility: MEDICAL CENTER | Age: 44
End: 2019-06-07

## 2019-06-12 ENCOUNTER — CONSULT (OUTPATIENT)
Dept: GASTROENTEROLOGY | Facility: MEDICAL CENTER | Age: 44
End: 2019-06-12
Payer: COMMERCIAL

## 2019-06-12 VITALS
DIASTOLIC BLOOD PRESSURE: 68 MMHG | SYSTOLIC BLOOD PRESSURE: 114 MMHG | TEMPERATURE: 98.9 F | HEART RATE: 67 BPM | BODY MASS INDEX: 21.99 KG/M2 | HEIGHT: 60 IN | WEIGHT: 112 LBS

## 2019-06-12 DIAGNOSIS — K60.2 ANAL FISSURE: ICD-10-CM

## 2019-06-12 DIAGNOSIS — K58.9 IRRITABLE BOWEL SYNDROME, UNSPECIFIED TYPE: ICD-10-CM

## 2019-06-12 DIAGNOSIS — K61.0 PERIANAL ABSCESS: Primary | ICD-10-CM

## 2019-06-12 PROCEDURE — 99244 OFF/OP CNSLTJ NEW/EST MOD 40: CPT | Performed by: INTERNAL MEDICINE

## 2019-06-12 RX ORDER — OMEPRAZOLE 20 MG/1
20 CAPSULE, DELAYED RELEASE ORAL DAILY
COMMUNITY
End: 2019-07-19

## 2019-06-12 RX ORDER — TRIAMCINOLONE ACETONIDE 55 UG/1
SPRAY, METERED NASAL
COMMUNITY
End: 2021-04-29

## 2019-06-12 NOTE — H&P (VIEW-ONLY)
[facility] - Outpatient Consultation  Martell Bush 44 y o  female MRN: [id number]  Encounter: [id number]          ASSESSMENT AND PLAN:    Martell Bush is a 44 y o  female with FH of colon cancer, h/o TI ulcers (attributed to NSAIDs) on last colonsocopy, and now recent hemorrhoidectomy c/b prolonged post-operative course with 2 jeb-rectal abscesses s/p drainage and a suture-related fissure  She is concerned regarding possible Crohn's given the h/o TI ulcers, mouth ulcers and the complications from the hemorrhoidectomy  1  Perianal abscess    2  Irritable bowel syndrome, unspecified type    3  Anal fissure      Orders Placed This Encounter   Procedures    MRI enterography w wo    MRI pelvis w wo contrast    Colonoscopy    EGD     -- Will plan for colonoscopy (to re-evaluate TI), EGD (given long-standing heartburn)  -- MRE to evaluate for evidence of small bowel inflammation  -- Further recs pending above  -- Continue to follow-up with [facility] regarding jeb-anal disease  ______________________________________________________________________    HPI:    Martell Bush is a 44 y o  female who presents with complaint of jeb-anal disease c/f Crohn's  Referred for potential IBD  She underwent a hemorrhoidectomy in February with Dr Cheyenne Mitchell  Seemingly routine initially, but her post-op course has been complicated by poor healing, and open suture line (which has appeared like a fissure), and 2 jeb-rectal abscesses s/p drainage (April and June)  Dr Cheyenne Mitchell did some testing 1 5 months ago  Including biopsies and calprotectin, which were reportedly normal  He recommended she get a second opinion, and she saw Dr Eloisa Nieves ([facility]) and he said the healing is slow, but he felt she should have an evaluation for an underlying issue  Her mother and grandfather had colon cancer, and she has been getting colonoscopies since her 30's (and denies any h/o colitis)   She had bouts of indigestion and heartburn for a long time, and she has experienced hemorrhoids since the birth of her second child 9 years ago  Last April had digestive issues, told it was possibly a stomach bug, IBS; tried IBGard but it made things worse  At that time she had a colonoscopy (May 2018 w/ [facility]) with ulcers in the ileum, but she was on NSAIDs at the time and was told it was likely NSAID related  She changed her diet (cut out gluten and dairy) and that improved her indigestion  She get mirgaines and nerve pain  She has intermittent abdominal pain, gas and bloating  Sometimes sharp pain in the LUQ, and sometimes RUQ  She also gets lower abdominal pain, pressure or cramps  It occurs most days of the week  She gets abdominal pain in the evening after her day  Has a BM every day, mostly [place] 3-4  No blood in the stool, but some blood on the stool, now getting better (she attributes this to a fissure)  Most recently she is more constipated and has been taking stool softeners  She is not eating much at all  She lost weight (but overall fluctuates)  Appetite is not good and she does not feel hungry  Mouth sores, multiple, on inner lip, side and tongue  No joint pains but + back pain  No rashes    + Heartburn, takes omeprazole  No reflux but + epigastric pain  No dysphagia, odynohagia  Some random nausea (especially in April or May) but no vomiting  No fevers, chills       Answers for HPI/ROS submitted by the patient on 6/12/2019   Abdominal pain  Chronicity: chronic  Onset: more than 1 year ago  Onset quality: gradual  Frequency: daily  Episode duration: 3 hours  Progression since onset: waxing and waning  Pain location: LUQ, RUQ, right flank  Pain - numeric: 3/10  Pain quality: burning, cramping, sharp  Radiates to: back, perineum  anorexia: Yes  arthralgias: No  belching: No  constipation: Yes  diarrhea: No  dysuria: No  fever: No  flatus: Yes  frequency: No  headaches: Yes  hematochezia: No  hematuria: No  melena: No  myalgias: Yes  nausea: Yes  weight loss: Yes  vomiting: No  Aggravated by: eating, NSAIDs  Relieved by: bowel movements, passing flatus, recumbency      REVIEW OF SYSTEMS:  10 point ROS reviewed and negative, except as above    Historical Information   History reviewed  No pertinent past medical history  Past Surgical History:   Procedure Laterality Date    HEMORRHOID SURGERY      VARICOSE VEIN SURGERY       Social History   Social History     Substance and Sexual Activity   Alcohol Use Yes    Comment: Socially      Social History     Substance and Sexual Activity   Drug Use No     Social History     Tobacco Use   Smoking Status Former Smoker   Smokeless Tobacco Never Used     Family History   Problem Relation Age of Onset    Colon cancer Mother     Coronary artery disease Maternal Grandmother     Coronary artery disease Paternal Grandmother     Hypertension Family     Colon cancer Maternal Grandfather        Meds/Allergies       Current Outpatient Medications:     amitriptyline (ELAVIL) 10 mg tablet    bisacodyl (DULCOLAX) 5 mg EC tablet    calcium carbonate-vitamin D (OSCAL-D) 500 mg-200 units per tablet    Cholecalciferol (VITAMIN D) 2000 units tablet    dihydroergotamine (MIGRANAL) 4 MG/ML nasal spray    ibuprofen (MOTRIN) 200 mg tablet    LORazepam (ATIVAN) 0 5 mg tablet    Magnesium 100 MG CAPS    naproxen (NAPROSYN) 500 mg tablet    omeprazole (PriLOSEC) 20 mg delayed release capsule    Probiotic Product (PROBIOTIC-10 PO)    Triamcinolone Acetonide (NASACORT ALLERGY 24HR) 55 MCG/ACT AERO    Turmeric, Curcuma Longa, (CURCUMIN) POWD    Na Sulfate-K Sulfate-Mg Sulf (SUPREP BOWEL PREP KIT) 17 5-3 13-1 6 GM/177ML SOLN    Allergies   Allergen Reactions    Other      Other reaction(s): rash    Pollen Extract Headache    Neomycin-Bacitracin Zn-Polymyx Rash       Objective     Blood pressure 114/68, pulse 67, temperature 98 9 °F (37 2 °C), height 5' (1 524 m), weight 50 8 kg (112 lb)   Body mass index is 21 87 kg/m²  PHYSICAL EXAM:      General Appearance:   Alert, cooperative, no distress   HEENT:   Normocephalic, atraumatic, anicteric  Neck:  Supple, symmetrical, trachea midline   Lungs:   Clear to auscultation bilaterally; no rales, rhonchi or wheezing; respirations unlabored    Heart[de-identified]   Regular rate and rhythm; no murmur, rub, or gallop  Abdomen:   Soft, non-tender, non-distended; normal bowel sounds; no masses, no organomegaly    Genitalia:   Deferred    Rectal:   Chaperoned - healing small fissure visualized  Extremities:  No cyanosis, clubbing or edema    Pulses:  2+ and symmetric    Skin:  No jaundice, rashes, or lesions    Lymph nodes:  No palpable cervical lymphadenopathy        Lab Results:   No visits with results within 1 Day(s) from this visit  Latest known visit with results is:   Lab on 10/02/2018   Component Date Value    Sed Rate 10/02/2018 8     LYME AB IGG 10/02/2018 0 61     LYME AB IGM 10/02/2018 0 16      Lab Results   Component Value Date    WBC 6 28 10/02/2018    HGB 13 0 10/02/2018    HCT 39 5 10/02/2018    MCV 95 10/02/2018     10/02/2018     Lab Results   Component Value Date    SODIUM 137 10/02/2018    K 4 4 10/02/2018     10/02/2018    CO2 29 10/02/2018    AGAP 5 10/02/2018    BUN 9 10/02/2018    CREATININE 0 81 10/02/2018    GLUC 98 09/17/2018    GLUF 74 10/02/2018    CALCIUM 8 5 10/02/2018    AST 12 10/02/2018    ALT 18 10/02/2018    ALKPHOS 40 (L) 10/02/2018    TP 7 3 10/02/2018    TBILI 0 36 10/02/2018    EGFR 90 10/02/2018     Lab Results   Component Value Date    SPT3NOYIDMFO 1 910 09/17/2018     No results found for: IRON, TIBC, FERRITIN    Radiology Results:   No results found

## 2019-06-18 ENCOUNTER — OFFICE VISIT (OUTPATIENT)
Dept: FAMILY MEDICINE CLINIC | Facility: CLINIC | Age: 44
End: 2019-06-18
Payer: COMMERCIAL

## 2019-06-18 ENCOUNTER — APPOINTMENT (OUTPATIENT)
Dept: LAB | Facility: CLINIC | Age: 44
End: 2019-06-18
Payer: COMMERCIAL

## 2019-06-18 VITALS
SYSTOLIC BLOOD PRESSURE: 112 MMHG | HEART RATE: 70 BPM | OXYGEN SATURATION: 95 % | TEMPERATURE: 98.4 F | DIASTOLIC BLOOD PRESSURE: 70 MMHG | RESPIRATION RATE: 16 BRPM | WEIGHT: 111.6 LBS | HEIGHT: 60 IN | BODY MASS INDEX: 21.91 KG/M2

## 2019-06-18 DIAGNOSIS — K61.1 PERIRECTAL ABSCESS: ICD-10-CM

## 2019-06-18 DIAGNOSIS — R63.4 UNINTENTIONAL WEIGHT LOSS: ICD-10-CM

## 2019-06-18 DIAGNOSIS — E55.9 VITAMIN D DEFICIENCY: ICD-10-CM

## 2019-06-18 DIAGNOSIS — F41.1 GENERALIZED ANXIETY DISORDER: ICD-10-CM

## 2019-06-18 DIAGNOSIS — G43.009 MIGRAINE WITHOUT AURA AND WITHOUT STATUS MIGRAINOSUS, NOT INTRACTABLE: Primary | ICD-10-CM

## 2019-06-18 LAB
25(OH)D3 SERPL-MCNC: 28 NG/ML (ref 30–100)
ALBUMIN SERPL BCP-MCNC: 3.9 G/DL (ref 3.5–5)
ALP SERPL-CCNC: 31 U/L (ref 46–116)
ALT SERPL W P-5'-P-CCNC: 20 U/L (ref 12–78)
ANION GAP SERPL CALCULATED.3IONS-SCNC: 3 MMOL/L (ref 4–13)
AST SERPL W P-5'-P-CCNC: 8 U/L (ref 5–45)
BASOPHILS # BLD AUTO: 0.03 THOUSANDS/ΜL (ref 0–0.1)
BASOPHILS NFR BLD AUTO: 1 % (ref 0–1)
BILIRUB SERPL-MCNC: 0.41 MG/DL (ref 0.2–1)
BUN SERPL-MCNC: 10 MG/DL (ref 5–25)
CALCIUM SERPL-MCNC: 8.9 MG/DL (ref 8.3–10.1)
CHLORIDE SERPL-SCNC: 105 MMOL/L (ref 100–108)
CO2 SERPL-SCNC: 31 MMOL/L (ref 21–32)
CREAT SERPL-MCNC: 0.83 MG/DL (ref 0.6–1.3)
CRP SERPL QL: <3 MG/L
EOSINOPHIL # BLD AUTO: 0.09 THOUSAND/ΜL (ref 0–0.61)
EOSINOPHIL NFR BLD AUTO: 2 % (ref 0–6)
ERYTHROCYTE [DISTWIDTH] IN BLOOD BY AUTOMATED COUNT: 11.9 % (ref 11.6–15.1)
ERYTHROCYTE [SEDIMENTATION RATE] IN BLOOD: 11 MM/HOUR (ref 0–20)
GFR SERPL CREATININE-BSD FRML MDRD: 87 ML/MIN/1.73SQ M
GLUCOSE SERPL-MCNC: 74 MG/DL (ref 65–140)
HCT VFR BLD AUTO: 38.5 % (ref 34.8–46.1)
HGB BLD-MCNC: 12.6 G/DL (ref 11.5–15.4)
IMM GRANULOCYTES # BLD AUTO: 0 THOUSAND/UL (ref 0–0.2)
IMM GRANULOCYTES NFR BLD AUTO: 0 % (ref 0–2)
LYMPHOCYTES # BLD AUTO: 1.37 THOUSANDS/ΜL (ref 0.6–4.47)
LYMPHOCYTES NFR BLD AUTO: 28 % (ref 14–44)
MCH RBC QN AUTO: 31 PG (ref 26.8–34.3)
MCHC RBC AUTO-ENTMCNC: 32.7 G/DL (ref 31.4–37.4)
MCV RBC AUTO: 95 FL (ref 82–98)
MONOCYTES # BLD AUTO: 0.39 THOUSAND/ΜL (ref 0.17–1.22)
MONOCYTES NFR BLD AUTO: 8 % (ref 4–12)
NEUTROPHILS # BLD AUTO: 2.94 THOUSANDS/ΜL (ref 1.85–7.62)
NEUTS SEG NFR BLD AUTO: 61 % (ref 43–75)
NRBC BLD AUTO-RTO: 0 /100 WBCS
PLATELET # BLD AUTO: 279 THOUSANDS/UL (ref 149–390)
PMV BLD AUTO: 11 FL (ref 8.9–12.7)
POTASSIUM SERPL-SCNC: 3.9 MMOL/L (ref 3.5–5.3)
PROT SERPL-MCNC: 6.9 G/DL (ref 6.4–8.2)
RBC # BLD AUTO: 4.06 MILLION/UL (ref 3.81–5.12)
SODIUM SERPL-SCNC: 139 MMOL/L (ref 136–145)
TSH SERPL DL<=0.05 MIU/L-ACNC: 1.47 UIU/ML (ref 0.36–3.74)
WBC # BLD AUTO: 4.82 THOUSAND/UL (ref 4.31–10.16)

## 2019-06-18 PROCEDURE — 36415 COLL VENOUS BLD VENIPUNCTURE: CPT

## 2019-06-18 PROCEDURE — 3008F BODY MASS INDEX DOCD: CPT | Performed by: INTERNAL MEDICINE

## 2019-06-18 PROCEDURE — 85652 RBC SED RATE AUTOMATED: CPT

## 2019-06-18 PROCEDURE — 99213 OFFICE O/P EST LOW 20 MIN: CPT | Performed by: INTERNAL MEDICINE

## 2019-06-18 PROCEDURE — 80053 COMPREHEN METABOLIC PANEL: CPT | Performed by: INTERNAL MEDICINE

## 2019-06-18 PROCEDURE — 82306 VITAMIN D 25 HYDROXY: CPT

## 2019-06-18 PROCEDURE — 86140 C-REACTIVE PROTEIN: CPT

## 2019-06-18 PROCEDURE — 84443 ASSAY THYROID STIM HORMONE: CPT | Performed by: INTERNAL MEDICINE

## 2019-06-18 PROCEDURE — 85025 COMPLETE CBC W/AUTO DIFF WBC: CPT | Performed by: INTERNAL MEDICINE

## 2019-06-18 PROCEDURE — 1036F TOBACCO NON-USER: CPT | Performed by: INTERNAL MEDICINE

## 2019-06-18 RX ORDER — LORAZEPAM 0.5 MG/1
0.5 TABLET ORAL DAILY PRN
Qty: 15 TABLET | Refills: 2 | Status: SHIPPED | OUTPATIENT
Start: 2019-06-18 | End: 2020-03-12 | Stop reason: SDUPTHER

## 2019-06-18 RX ORDER — SERTRALINE HYDROCHLORIDE 25 MG/1
25 TABLET, FILM COATED ORAL DAILY
Qty: 30 TABLET | Refills: 5 | Status: ON HOLD | OUTPATIENT
Start: 2019-06-18 | End: 2020-03-26 | Stop reason: ALTCHOICE

## 2019-06-19 ENCOUNTER — TELEPHONE (OUTPATIENT)
Dept: GASTROENTEROLOGY | Facility: MEDICAL CENTER | Age: 44
End: 2019-06-19

## 2019-06-19 DIAGNOSIS — E55.9 VITAMIN D DEFICIENCY: Primary | ICD-10-CM

## 2019-06-25 ENCOUNTER — TELEPHONE (OUTPATIENT)
Dept: FAMILY MEDICINE CLINIC | Facility: CLINIC | Age: 44
End: 2019-06-25

## 2019-06-25 ENCOUNTER — DOCUMENTATION (OUTPATIENT)
Dept: GASTROENTEROLOGY | Facility: MEDICAL CENTER | Age: 44
End: 2019-06-25

## 2019-07-03 ENCOUNTER — PROCEDURE VISIT (OUTPATIENT)
Dept: NEUROLOGY | Facility: CLINIC | Age: 44
End: 2019-07-03
Payer: COMMERCIAL

## 2019-07-03 VITALS — HEART RATE: 76 BPM | TEMPERATURE: 98.4 F | SYSTOLIC BLOOD PRESSURE: 126 MMHG | DIASTOLIC BLOOD PRESSURE: 71 MMHG

## 2019-07-03 DIAGNOSIS — G24.3 CERVICAL DYSTONIA: Primary | ICD-10-CM

## 2019-07-03 PROCEDURE — 64616 CHEMODENERV MUSC NECK DYSTON: CPT | Performed by: PSYCHIATRY & NEUROLOGY

## 2019-07-03 NOTE — PROGRESS NOTES
Chemodenervation  Date/Time: 7/3/2019 11:48 AM  Performed by: Luis E Thrasher MD  Authorized by: Luis E Thrasher MD     Pre-procedure details:     Prepped With: Alcohol    Anesthesia (see MAR for exact dosages):      Anesthesia method:  None  Procedure details:     Position:  Upright  Botox:     Botox Type:  Type A    Brand:  Botox    Final Concentration per CC:  200 units    Needle Gauge:  30 G 2 5 inch      Botox Procedures: cervical dystonia    Indications: spasmodic torticollis      Injection Location:     Head / Face:  R superior cervical paraspinal, L , L frontalis, R frontalis, R , L inferior occipitalis, R inferior occipitalis, L temporalis, R temporalis, L superior trapezius, R superior trapezius, procerus and L superior cervical paraspinal       L  injection amount:  2 5 unit(s)    R  injection amount: 2  5 unit(s)    Procerus injection amount:  5 unit(s)       Left orbicularis oculi - 2 5 units     Right orbicularis oculi - 2 5 units       L lateral frontalis:  2 5 unit(s)    R lateral frontalis:  2 5 unit(s)    L medial frontalis:  2 5 unit(s)    R medial frontalis: 2 5 unit(s)       L temporalis injection amount:  20 unit(s)    R temporalis injection amount:  20 unit(s)        L inferior occipitalis injection amount:  15 unit(s)    R inferior occipitalis injection amount:  15 unit(s)       L superior cervical paraspinal injection amount:  10 unit(s)    R superior cervical paraspinal injection amount:  10 unit(s)       L superior trapezius injection amount:  15 unit(s)    R superior trapezius injection amount:  15 unit(s)      bilateral parietal region injection amount:  7 5 each side     L sternocleidomastoid, L splenius cervicis, L semispinalis capitis and L horizontal trapezius        L sternocleidomastoid injection amount:   10 unit(s)        R sternocleidomastoid injection amount:   5 unit(s)        L semispinalis capitis injection amount:   0 unit(s)      R semispinalis capitis injection amount:  2 5 unit(s)        L splenius cervicis injection amount:  0 unit(s)      R splenius cervicis injection amount:   2 5 unit(s)        L levator scapulae amount:  10 unit(s)      R Levator scapulae amount:  10unit(s)       Total units used:  200 units  Total units wasted:  0 units     Post-procedure details:   Patient tolerance of procedure:   Tolerated well, no immediate complications

## 2019-07-08 ENCOUNTER — ANESTHESIA EVENT (OUTPATIENT)
Dept: GASTROENTEROLOGY | Facility: MEDICAL CENTER | Age: 44
End: 2019-07-08

## 2019-07-09 ENCOUNTER — HOSPITAL ENCOUNTER (OUTPATIENT)
Dept: GASTROENTEROLOGY | Facility: MEDICAL CENTER | Age: 44
Setting detail: OUTPATIENT SURGERY
Discharge: HOME | End: 2019-07-09
Attending: INTERNAL MEDICINE | Admitting: INTERNAL MEDICINE
Payer: COMMERCIAL

## 2019-07-09 ENCOUNTER — TELEPHONE (OUTPATIENT)
Dept: GASTROENTEROLOGY | Facility: AMBULARY SURGERY CENTER | Age: 44
End: 2019-07-09

## 2019-07-09 ENCOUNTER — ANESTHESIA (OUTPATIENT)
Dept: GASTROENTEROLOGY | Facility: MEDICAL CENTER | Age: 44
End: 2019-07-09

## 2019-07-09 VITALS
HEIGHT: 60 IN | DIASTOLIC BLOOD PRESSURE: 72 MMHG | HEART RATE: 74 BPM | WEIGHT: 111 LBS | SYSTOLIC BLOOD PRESSURE: 121 MMHG | BODY MASS INDEX: 21.79 KG/M2 | RESPIRATION RATE: 16 BRPM | TEMPERATURE: 99.2 F | OXYGEN SATURATION: 99 %

## 2019-07-09 DIAGNOSIS — K61.0 PERIANAL ABSCESS: ICD-10-CM

## 2019-07-09 LAB
EXT PREGNANCY TEST URINE: NEGATIVE
EXT. CONTROL: NORMAL

## 2019-07-09 PROCEDURE — 43239 EGD BIOPSY SINGLE/MULTIPLE: CPT | Performed by: INTERNAL MEDICINE

## 2019-07-09 PROCEDURE — 88305 TISSUE EXAM BY PATHOLOGIST: CPT | Performed by: PATHOLOGY

## 2019-07-09 PROCEDURE — 45380 COLONOSCOPY AND BIOPSY: CPT | Performed by: INTERNAL MEDICINE

## 2019-07-09 PROCEDURE — 81025 URINE PREGNANCY TEST: CPT | Performed by: ANESTHESIOLOGY

## 2019-07-09 RX ORDER — LIDOCAINE HYDROCHLORIDE 20 MG/ML
INJECTION, SOLUTION EPIDURAL; INFILTRATION; INTRACAUDAL; PERINEURAL AS NEEDED
Status: DISCONTINUED | OUTPATIENT
Start: 2019-07-09 | End: 2019-07-09 | Stop reason: SURG

## 2019-07-09 RX ORDER — PROPOFOL 10 MG/ML
INJECTION, EMULSION INTRAVENOUS CONTINUOUS PRN
Status: DISCONTINUED | OUTPATIENT
Start: 2019-07-09 | End: 2019-07-09 | Stop reason: SURG

## 2019-07-09 RX ORDER — PROPOFOL 10 MG/ML
INJECTION, EMULSION INTRAVENOUS AS NEEDED
Status: DISCONTINUED | OUTPATIENT
Start: 2019-07-09 | End: 2019-07-09 | Stop reason: SURG

## 2019-07-09 RX ORDER — SODIUM CHLORIDE 9 MG/ML
125 INJECTION, SOLUTION INTRAVENOUS CONTINUOUS
Status: DISCONTINUED | OUTPATIENT
Start: 2019-07-09 | End: 2019-07-13 | Stop reason: HOSPADM

## 2019-07-09 RX ADMIN — PROPOFOL 170 MCG/KG/MIN: 10 INJECTION, EMULSION INTRAVENOUS at 12:41

## 2019-07-09 RX ADMIN — SODIUM CHLORIDE: 0.9 INJECTION, SOLUTION INTRAVENOUS at 13:01

## 2019-07-09 RX ADMIN — SODIUM CHLORIDE 125 ML/HR: 0.9 INJECTION, SOLUTION INTRAVENOUS at 12:00

## 2019-07-09 RX ADMIN — PROPOFOL 20 MG: 10 INJECTION, EMULSION INTRAVENOUS at 12:41

## 2019-07-09 RX ADMIN — PROPOFOL 20 MG: 10 INJECTION, EMULSION INTRAVENOUS at 12:37

## 2019-07-09 RX ADMIN — PROPOFOL 20 MG: 10 INJECTION, EMULSION INTRAVENOUS at 12:39

## 2019-07-09 RX ADMIN — LIDOCAINE HYDROCHLORIDE 3 ML: 20 INJECTION, SOLUTION EPIDURAL; INFILTRATION; INTRACAUDAL; PERINEURAL at 12:35

## 2019-07-09 RX ADMIN — PROPOFOL 120 MG: 10 INJECTION, EMULSION INTRAVENOUS at 12:35

## 2019-07-09 NOTE — TELEPHONE ENCOUNTER
[facility] PT        [facility] called requesting a facility change for the [address]   xfer to Kavya Corley

## 2019-07-09 NOTE — DISCHARGE INSTRUCTIONS

## 2019-07-09 NOTE — ANESTHESIA PREPROCEDURE EVALUATION
Review of Systems/Medical History  Patient summary reviewed  Chart reviewed      Cardiovascular   Pulmonary  Negative pulmonary ROS        GI/Hepatic    Bowel prep       Negative  ROS        Endo/Other  Negative endo/other ROS      GYN       Hematology  Negative hematology ROS      Musculoskeletal  Negative musculoskeletal ROS        Neurology    Headaches,    Psychology   Anxiety,              Physical Exam    Airway    Mallampati score: I  TM Distance: <3 FB  Neck ROM: full     Dental   No notable dental hx     Cardiovascular  Rhythm: regular, Rate: normal, Cardiovascular exam normal    Pulmonary  Pulmonary exam normal     Other Findings        Anesthesia Plan  ASA Score- 2     Anesthesia Type- IV sedation with anesthesia with ASA Monitors  Additional Monitors:   Airway Plan:         Plan Factors- Patient instructed to abstain from smoking on day of procedure  Patient did not smoke on day of surgery  Induction- intravenous  Postoperative Plan-     Informed Consent- Anesthetic plan and risks discussed with patient

## 2019-07-09 NOTE — INTERVAL H&P NOTE
Vitals:    07/09/19 1155   BP: 138/78   Pulse: 81   Resp: 16   Temp: 99 2 °F (37 3 °C)   TempSrc: Temporal   SpO2: 100%   Weight: 50 3 kg (111 lb)   Height: 5' (1 524 m)         H&P reviewed  After examining the patient I find no changes in the patients condition since the H&P had been written

## 2019-07-11 ENCOUNTER — APPOINTMENT (OUTPATIENT)
Dept: MRI IMAGING | Facility: HOSPITAL | Age: 44
End: 2019-07-11
Attending: INTERNAL MEDICINE
Payer: COMMERCIAL

## 2019-07-11 ENCOUNTER — HOSPITAL ENCOUNTER (OUTPATIENT)
Dept: MRI IMAGING | Facility: HOSPITAL | Age: 44
Discharge: HOME | End: 2019-07-11
Attending: INTERNAL MEDICINE
Payer: COMMERCIAL

## 2019-07-11 DIAGNOSIS — K61.0 PERIANAL ABSCESS: ICD-10-CM

## 2019-07-11 PROCEDURE — 72197 MRI PELVIS W/O & W/DYE: CPT

## 2019-07-11 PROCEDURE — 74183 MRI ABD W/O CNTR FLWD CNTR: CPT

## 2019-07-11 PROCEDURE — A9585 GADOBUTROL INJECTION: HCPCS | Performed by: INTERNAL MEDICINE

## 2019-07-11 RX ADMIN — GLUCAGON HYDROCHLORIDE 1 MG: KIT at 08:53

## 2019-07-11 RX ADMIN — GADOBUTROL 5 ML: 604.72 INJECTION INTRAVENOUS at 09:10

## 2019-07-14 ENCOUNTER — HOSPITAL ENCOUNTER (OUTPATIENT)
Dept: MRI IMAGING | Facility: HOSPITAL | Age: 44
Discharge: HOME | End: 2019-07-14
Attending: INTERNAL MEDICINE
Payer: COMMERCIAL

## 2019-07-14 DIAGNOSIS — K61.0 PERIANAL ABSCESS: ICD-10-CM

## 2019-07-14 PROCEDURE — 72197 MRI PELVIS W/O & W/DYE: CPT

## 2019-07-14 PROCEDURE — A9585 GADOBUTROL INJECTION: HCPCS | Performed by: INTERNAL MEDICINE

## 2019-07-14 RX ADMIN — GADOBUTROL 5 ML: 604.72 INJECTION INTRAVENOUS at 11:00

## 2019-07-17 ENCOUNTER — TELEPHONE (OUTPATIENT)
Dept: NEUROLOGY | Facility: CLINIC | Age: 44
End: 2019-07-17

## 2019-07-17 DIAGNOSIS — G43.009 MIGRAINE WITHOUT AURA AND WITHOUT STATUS MIGRAINOSUS, NOT INTRACTABLE: Primary | ICD-10-CM

## 2019-07-17 RX ORDER — AMITRIPTYLINE HYDROCHLORIDE 10 MG/1
20 TABLET, FILM COATED ORAL
Qty: 60 TABLET | Refills: 3 | Status: SHIPPED | OUTPATIENT
Start: 2019-07-17 | End: 2019-12-05 | Stop reason: SDUPTHER

## 2019-07-17 NOTE — TELEPHONE ENCOUNTER
Sent rx to pharm  Please have patient document her headaches in a diary  Thanks    ----- Message from Zoya Dale sent at 7/17/2019 12:05 PM EDT -----  Regarding: FW: Prescription Question  Contact: [phone]      ----- Message -----  From: Carolina Stewart  Sent: 7/17/2019  12:01 PM EDT  To: Neurology [facility]  Subject: Prescription Question                            Hi Dr Marjorie Olson,     In my previous message, I mentioned that I was having pain in my head and neck as I have been tapering off the amitriptyline  I have tapered down to 10 mg per day but I am now also getting headaches that are constant  I have decided to resume my 20 mg per day dosage of amitriptyline until my next appointment in September  Can you please refill the prescription? My old neurologist was the original prescribing physician  My pharmacy is the [facility] in [place]  I leave for vacation on Friday and will need to  the refill by then       Thank you,  Carolina Stewart

## 2019-07-19 ENCOUNTER — TELEPHONE (OUTPATIENT)
Dept: GASTROENTEROLOGY | Facility: AMBULARY SURGERY CENTER | Age: 44
End: 2019-07-19

## 2019-07-19 DIAGNOSIS — K31.A0 INTESTINAL METAPLASIA OF GASTRIC MUCOSA: Primary | ICD-10-CM

## 2019-07-19 RX ORDER — OMEPRAZOLE 40 MG/1
40 CAPSULE, DELAYED RELEASE ORAL
Qty: 30 CAPSULE | Refills: 3 | Status: SHIPPED | OUTPATIENT
Start: 2019-07-19 | End: 2019-11-04 | Stop reason: SDUPTHER

## 2019-07-19 NOTE — TELEPHONE ENCOUNTER
Sage Machado - Patient is calling t/d MRI pelvis findings - intersphincteric perianal fistula  It looks like she is following with [facility]  Wanted to t/b before advising  Thank you!

## 2019-07-19 NOTE — TELEPHONE ENCOUNTER
Dejon patient      She would like a call back to discuss her mri pelvis results      Call back # [phone]

## 2019-07-19 NOTE — PROGRESS NOTES
Called and spoke with the patient  We discussed MRI results, biopsy results  MRI with fistula  Referred her to Dr Dianelys Claire for evaluation  Biopsies with gastric and possible GE junction IM  Will repeat EGD in 1 year and then every 3 years depending on the results  Will continue omeprazole daily  All questions answered and the patient is in agreement

## 2019-07-31 ENCOUNTER — TELEPHONE (OUTPATIENT)
Dept: GASTROENTEROLOGY | Facility: CLINIC | Age: 44
End: 2019-07-31

## 2019-07-31 PROBLEM — K60.30 FISTULA-IN-ANO: Status: ACTIVE | Noted: 2019-07-31

## 2019-07-31 PROBLEM — K60.3 FISTULA-IN-ANO: Status: ACTIVE | Noted: 2019-07-31

## 2019-07-31 NOTE — TELEPHONE ENCOUNTER
Dejon robert    Pt is requesting a call back from the doctor to discuss testing and her care [phone]

## 2019-08-01 NOTE — TELEPHONE ENCOUNTER
Called and spoke with the patient  She has an appointment at [facility] with Dr Roda Hodgkin  She will keep me updated

## 2019-09-03 NOTE — PROGRESS NOTES
[facility]  PATIENT:  Onesimo Lind  MRN:  [id number]  :  1975  DATE OF SERVICE:  2019      Assessment/Plan:     Migraine without aura and without status migrainosus, not intractable  Preventive therapy for headaches:   - continue taking magnesium 400 mg once a day, patient may add vitamin B2 200 mg twice a day as that does help with migraine headaches as well in decreasing the intensity and frequency   -amitriptyline 20 mg at bedtime-will attempt to wean after next Botox    Abortive therapy for headaches:   - at the onset of a migraine headache patient is to take naproxen if it fails patient is to take her migrainal nasal spray  We did discuss a trial of prochlorperazine but patient defers at this time    Cervical dystonia  Continue Botox every 3 months as has had significant improvement in her ROM, pain with movement and head tilt  Problem List Items Addressed This Visit        Cardiovascular and Mediastinum    Migraine without aura and without status migrainosus, not intractable     Preventive therapy for headaches:   - continue taking magnesium 400 mg once a day, patient may add vitamin B2 200 mg twice a day as that does help with migraine headaches as well in decreasing the intensity and frequency   -amitriptyline 20 mg at bedtime-will attempt to wean after next Botox    Abortive therapy for headaches:   - at the onset of a migraine headache patient is to take naproxen if it fails patient is to take her migrainal nasal spray  We did discuss a trial of prochlorperazine but patient defers at this time            Nervous and Auditory    Cervical dystonia - Primary     Continue Botox every 3 months as has had significant improvement in her ROM, pain with movement and head tilt  History of Present Illness: We had the pleasure of evaluating Onesimo Lind in neurological follow up  today for headaches  As you know,  she is a 44 y o  right handedfemale  Patient works for [employer] in [employer]  She is here today for evaluation of her headaches and neck pain  Patient attempted to wean off the Amitriptyline after the Botox but starting to worsen as she decreased the dose  Has had an increase in activity since botox  She has less neuropathy and able       Past medical history:  Anxiety  Varicose veins of the leg  GERD  Colitis/it will bowel syndrome  Neck pain/cervical dystonia     Any family history of migraines? No  Any family history of aneurysms? No     Have you seen someone else for headaches or pain? Yes [facility] and PCP     Headaches:   What medications do you take or have you taken for your headaches? Preventive therapy:   - magnesium, B complex, vitamin-D, Turmeric  - amitriptyline 20 mg  - Robaxin 750, Flexeril  Abortive Therapy:   - Ativan  - ibuprofen (Advil, Motrin), naproxen  - Imitrex, migranal    What is your current pain level? 2/10     Headaches started at what age?  8th grade and started her menstruation  How often do the headaches occur? 1 a month  What time of the day do the headaches start? Varies  How long do the headaches last?  1 day on average     Are you ever headache free? Yes     Aura/Warning and how long does it last ? Flashing light in the peripheral vision and this would last for 40 minutes and then headache started       Describe your usual headache? Throbbing, pressure, burning, shooting, electrical, stabbing  Patient does state that the stabbing pain that she has is throughout her right occipital parietal and frontal region  Where is your headache located? Typically locked on the right side of her head and face  What is the intensity of pain?   Pain can get up to 10/10     Associated symptoms:   Decreased appetite, nausea  Phonophobic and sensitivity to smells  Problems with concentration  Stiff or sore neck, hands or feet tingle or feel numb  Unable to work, prefer a dark and quiet room     Number of days missed per month because of headaches:  Work (or school) days:  Has not recently but did miss few a month last year in one month  Social or Family activities:  Yes and has in the past     Headache are worse if the patient: cough, sneeze, bending over, exertion  Headache triggers:  Lack of sleep will bring on neck pain and exertion  What time of the year do headaches occur more frequently? do not seem to be related to any time of day or year     Have you had trigger point injection performed and how often? Yes  Have you had Botox injection performed and how often? yes   Have you had epidural injections or transforaminal injections performed? No     Alternative therapies used in the past for headaches? physical therapy  Have you used CBD or THC for your headaches and how often? Yes has   How many caffeine products to drink a day? How much water to drink a day?     Are you current pregnant or planning on getting pregnant? No              Benign positional vertigo:  Diagnosed with BPV and told to do PT but did not go for it at the time  Has not had this since feb  It is random as to when it occurs  First time she had this was when she was home and turned around and lasted for seconds but then had a headaches after that  And this hfappened twice in jan 2019 and both time she was moving suddenly and did have a headache with this       Neck pain/cervical dystonia:  What is the intensity of your pain? 2/10 at this time  When did the neck pain start? June of 2018  Where is the pain located? Right side and goes into the right neck and shoulder region  It does not shoot down her arm but goes up her face and head  She has noted that the first tow teeth in the front feel numb with this  This has improved with the first round of Botox  Have you noticed decreased range of movement in your neck?  Now has marked improvement since Botox  Does your neck pain cause you to have headaches? yes  At what time of the day is your neck pain:  - Worst: there is no specific time of the day and depends on her posture and how her movements have been  Has been standing or exertion  Then pain is much worse  - Best:morning  Is your neck pain associated with: none  What aggravates neck pain? Not sure  What alleviates neck pain? hot or cold packs, cbd cream, PT for her neck        Have you ever had any Brain imaging? yes  images done at [facility]  Patient brought in her discs, which were loaded into our system      I personally reviewed these images  - Reviewed old notes from physician seen in the past- see above HPI for summary of previous encounters  Past Medical History:   Diagnosis Date    Anal fissure     Migraine        Patient Active Problem List   Diagnosis    Allergic rhinitis    Nasal septal deviation    Generalized anxiety disorder    Family history of colon cancer in mother   Leslie Schwartz Encounter for physical examination    Cervical dystonia    Migraine without aura and without status migrainosus, not intractable    Perirectal abscess    Fistula-in-ano       Medications:      Current Outpatient Medications   Medication Sig Dispense Refill    amitriptyline (ELAVIL) 10 mg tablet Take 2 tablets (20 mg total) by mouth daily at bedtime 60 tablet 3    bisacodyl (DULCOLAX) 5 mg EC tablet Take 5 mg by mouth daily       calcium carbonate-vitamin D (OSCAL-D) 500 mg-200 units per tablet Take 2 tablets by mouth daily      dihydroergotamine (MIGRANAL) 4 MG/ML nasal spray 1 spray into each nostril as needed Use in one nostril as directed    No more than 4 sprays in one hour      Ergocalciferol 2000 units CAPS Take 2,000 Units by mouth daily 30 capsule 0    LORazepam (ATIVAN) 0 5 mg tablet Take 1 tablet (0 5 mg total) by mouth daily as needed for anxiety 15 tablet 2    Magnesium 100 MG CAPS Take 1 capsule by mouth daily       omeprazole (PriLOSEC) 40 MG capsule Take 1 capsule (40 mg total) by mouth daily before breakfast 30 capsule 3    Probiotic Product (PROBIOTIC-10 PO) Take 1 tablet by mouth daily       Triamcinolone Acetonide (NASACORT ALLERGY 24HR) 55 MCG/ACT AERO into each nostril      Turmeric, Curcuma Longa, (CURCUMIN) POWD Take 1 tablet by mouth daily       ibuprofen (MOTRIN) 200 mg tablet Take by mouth every 6 (six) hours as needed for mild pain      naproxen (NAPROSYN) 500 mg tablet       sertraline (ZOLOFT) 25 mg tablet Take 1 tablet (25 mg total) by mouth daily (Patient not taking: Reported on 9/4/2019) 30 tablet 5     No current facility-administered medications for this visit  Allergies:       Allergies   Allergen Reactions    Other      Other reaction(s): rash    Pollen Extract Headache       Family History:     Family History   Problem Relation Age of Onset    Colon cancer Mother     Coronary artery disease Maternal Grandmother     Coronary artery disease Paternal Grandmother     Hypertension Family     Colon cancer Maternal Grandfather        Social History:     Social History     Socioeconomic History    Marital status: /Civil Union     Spouse name: Not on file    Number of children: Not on file    Years of education: Not on file    Highest education level: Not on file   Occupational History    Not on file   Social Needs    Financial resource strain: Not on file    Food insecurity:     Worry: Not on file     Inability: Not on file    Transportation needs:     Medical: Not on file     Non-medical: Not on file   Tobacco Use    Smoking status: Former Smoker    Smokeless tobacco: Never Used   Substance and Sexual Activity    Alcohol use: Yes     Comment: Socially     Drug use: No    Sexual activity: Not on file   Lifestyle    Physical activity:     Days per week: Not on file     Minutes per session: Not on file    Stress: Not on file   Relationships    Social connections:     Talks on phone: Not on file     Gets together: Not on file     Attends Worship service: Not on file     Active member of club or organization: Not on file     Attends meetings of clubs or organizations: Not on file     Relationship status: Not on file    Intimate partner violence:     Fear of current or ex partner: Not on file     Emotionally abused: Not on file     Physically abused: Not on file     Forced sexual activity: Not on file   Other Topics Concern    Not on file   Social History Narrative    Not on file         Objective:   Physical Exam:                                                                   Vitals:            /66 (BP Location: Left arm, Patient Position: Sitting, Cuff Size: Adult)   Pulse 77   Ht 5' (1 524 m)   Wt 53 5 kg (118 lb)   BMI 23 05 kg/m²   BP Readings from Last 3 Encounters:   09/04/19 107/66   07/31/19 107/76   07/09/19 121/72     Pulse Readings from Last 3 Encounters:   09/04/19 77   07/31/19 88   07/09/19 74                CONSTITUTIONAL: Well developed, well nourished, well groomed  No dysmorphic features  Eyes:  PERRLA, EOM normal      Neck: Improved ROM, slight head tilt to left      HEENT:  Normocephalic atraumatic  No meningismus  Oropharynx is clear and moist  No oral mucosal lesions  Chest:  Respirations regular and unlabored  Cardiovascular:  Distal extremities warm without palpable edema or tenderness, no observed significant swelling  Musculoskeletal:  Full range of motion  (see below under neurologic exam for evaluation of motor function and gait)   Skin:  warm and dry   Psychiatric:  Normal behavior and appropriate affect        SKULL AND SPINE  ROM: improved range of motion with decrease in pain with movement  Tenderness: + focal tenderness right occipitalis    MENTAL STATUS  Orientation: Alert and oriented x 3  Fund of knowledge: Intact  CRANIAL NERVES  II: PERRL  Visual fields full  III, IV, VI: Extraocular movements intact  No nystagmus    V: Facial sensation normal V1-V3  VII: Facial movements normal and symmetric  VIII: Intact hearing bilaterally  IX, X: Palate elevation normal and symmetric  XI: Intact trapezius, SCM strength  XII: Tongue protrudes to the midline    MOTOR (Upper and lower extremities)   Bulk/tone/abnormal movement: Normal muscle bulk and tone  Strength: Strength 5/5 throughout  COORDINATION   Station/Gait: Normal baseline gait  Reflexes:  deep tendon reflexes are 2+/4 throughout, flexor response bilaterally       Review of Systems:   Review of Systems   Constitutional: Negative  HENT: Positive for tinnitus  Eyes: Negative  Respiratory: Negative  Cardiovascular: Negative  Gastrointestinal: Negative  Endocrine: Negative  Genitourinary: Negative  Musculoskeletal: Positive for neck pain and neck stiffness  Skin: Negative  Allergic/Immunologic: Positive for environmental allergies (pollen )  Neurological: Positive for light-headedness and headaches  Hematological: Negative  Psychiatric/Behavioral: Negative          I personally reviewed and updated the ROS that was entered by the medical assistant        Author:  Dani Camilo PA-C 9/4/2019 10:41 AM

## 2019-09-04 ENCOUNTER — OFFICE VISIT (OUTPATIENT)
Dept: NEUROLOGY | Facility: CLINIC | Age: 44
End: 2019-09-04
Payer: COMMERCIAL

## 2019-09-04 VITALS
SYSTOLIC BLOOD PRESSURE: 107 MMHG | HEART RATE: 77 BPM | HEIGHT: 60 IN | WEIGHT: 118 LBS | BODY MASS INDEX: 23.16 KG/M2 | DIASTOLIC BLOOD PRESSURE: 66 MMHG

## 2019-09-04 DIAGNOSIS — G24.3 CERVICAL DYSTONIA: Primary | ICD-10-CM

## 2019-09-04 DIAGNOSIS — G43.009 MIGRAINE WITHOUT AURA AND WITHOUT STATUS MIGRAINOSUS, NOT INTRACTABLE: ICD-10-CM

## 2019-09-04 PROCEDURE — 99214 OFFICE O/P EST MOD 30 MIN: CPT | Performed by: PHYSICIAN ASSISTANT

## 2019-09-04 NOTE — PATIENT INSTRUCTIONS
Preventive therapy for headaches:   - continue taking magnesium 400 mg once a day, patient may add vitamin B2 200 mg twice a day as that does help with migraine headaches as well in decreasing the intensity and frequency   -amitriptyline 20 mg at bedtime-will attempt to wean after next Botox    Abortive therapy for headaches:   - at the onset of a migraine headache patient is to take naproxen if it fails patient is to take her migrainal nasal spray  Neck pain/cervical dystonia:   - We discussed the role of neck pathology and cervical dystonia  It can at time trigger migraine headaches as well  We discussed how tightening of the neck muscles can irritate the nerves in the occipital region of her head and cause or worsen head pain  Patient has completed physical therapy in does neck exercises on her own  Headache management instructions  - When patient has a moderate to severe headache, they should seek rest, initiate relaxation and apply cold compresses to the head  - Maintain regular sleep schedule  Adults need at least 7-8 hours of uninterrupted a night  - Limit over the counter medications such as Tylenol, Ibuprofen, Aleve, Excedrin  (No more than 3 times a week)  - Maintain headache diary  We discussed an MILO for a smart phone is "Migraine alana"  - Limit caffeine to 1-2 cups 8 to 16 oz a day or less  - Avoid dietary trigger  (aged cheese, peanuts, MSG, aspartame and nitrates)  - Patient is to have regular frequent meals to prevent headache onset  - Please drink at least 64 ounces of water a day to help remain hydrated  Please call with any questions or concerns   Office number is [phone]

## 2019-09-04 NOTE — ASSESSMENT & PLAN NOTE
Preventive therapy for headaches:   - continue taking magnesium 400 mg once a day, patient may add vitamin B2 200 mg twice a day as that does help with migraine headaches as well in decreasing the intensity and frequency   -amitriptyline 20 mg at bedtime-will attempt to wean after next Botox    Abortive therapy for headaches:   - at the onset of a migraine headache patient is to take naproxen if it fails patient is to take her migrainal nasal spray    We did discuss a trial of prochlorperazine but patient defers at this time

## 2019-09-04 NOTE — ASSESSMENT & PLAN NOTE
Continue Botox every 3 months as has had significant improvement in her ROM, pain with movement and head tilt

## 2019-09-12 ENCOUNTER — TELEPHONE (OUTPATIENT)
Dept: GASTROENTEROLOGY | Facility: CLINIC | Age: 44
End: 2019-09-12

## 2019-09-12 NOTE — TELEPHONE ENCOUNTER
Pt called wanting to speak to you only   Pt states she would like to see you but before your first avail in Nov  Pt can be reached at [phone]

## 2019-09-13 NOTE — TELEPHONE ENCOUNTER
Attempted to call the patient back but no response  Voice message left for the patient  Will also send MyChart message

## 2019-09-18 ENCOUNTER — TELEPHONE (OUTPATIENT)
Dept: NEUROLOGY | Facility: CLINIC | Age: 44
End: 2019-09-18

## 2019-09-18 NOTE — TELEPHONE ENCOUNTER
General 09/17/2019  4:27 PM Ken Mendiola MA CARE COORDINATION -   Note    BOTOX 200 UNITS (VISIT# 2) [facility]-[id number] 4 VISITS, AV 05/20/19 TILL 05/19/20    [facility]

## 2019-09-26 ENCOUNTER — OFFICE VISIT (OUTPATIENT)
Dept: GASTROENTEROLOGY | Facility: MEDICAL CENTER | Age: 44
End: 2019-09-26
Payer: COMMERCIAL

## 2019-09-26 VITALS
TEMPERATURE: 98.4 F | BODY MASS INDEX: 23.25 KG/M2 | SYSTOLIC BLOOD PRESSURE: 112 MMHG | HEIGHT: 60 IN | DIASTOLIC BLOOD PRESSURE: 76 MMHG | HEART RATE: 83 BPM | WEIGHT: 118.4 LBS

## 2019-09-26 DIAGNOSIS — K31.A0 INTESTINAL METAPLASIA OF GASTRIC MUCOSA: ICD-10-CM

## 2019-09-26 DIAGNOSIS — K60.3 FISTULA-IN-ANO: Primary | ICD-10-CM

## 2019-09-26 PROCEDURE — 99214 OFFICE O/P EST MOD 30 MIN: CPT | Performed by: INTERNAL MEDICINE

## 2019-09-26 NOTE — PROGRESS NOTES
[facility] - Outpatient Consultation  Martell Bush 44 y o  female MRN: [id number]  Encounter: [id number]          ASSESSMENT AND PLAN:    Martell Bush is a 44 y o  female who presents with complaint of persistent fistula  Last colonoscopy and MRE without evidence of Crohn's disease  I think it is doubtful that this isolated jeb-rectal fistula represents Crohn's (although it is not impossible)  1  Fistula-in-ano    2  Intestinal metaplasia of gastric mucosa        No orders of the defined types were placed in this encounter  -- Follow-up with Dr Eloisa Nieves  -- EGD within 1 year for gastric IM, followed by surveillance program    ______________________________________________________________________    HPI:    Martell Bush is a 44 y o  female who presents with complaint of possible new abscess on the left side where prior skin tag was removed  She is nervous and is waiting to see Dr Eloisa Nievse  She saw Dr Krishna Hough and concern for pus  It is uncomfortable to have a BM, but not as bad as the hemorrhoidectomy  Sometimes uncomfortable to sit  It is painful in the rectum and muscle  + leakage  + Blood  She takes dulcolax and Miralax daily, with somewhat soft BMs per day, but still slightly on the harder side at times  She recently took Flagyl and she has an upset stomach, but the omeprazole helps       Answers for HPI/ROS submitted by the patient on 9/24/2019   Abdominal pain  Chronicity: chronic  Onset: more than 1 month ago  Onset quality: gradual  Frequency: constantly  Episode duration: 7 months  Progression since onset: waxing and waning  Pain - numeric: 7/10  Pain quality: aching, burning, sharp, tearing    REVIEW OF SYSTEMS:  10 point ROS reviewed and negative, except as above    Historical Information   Past Medical History:   Diagnosis Date    Anal fissure     Migraine      Past Surgical History:   Procedure Laterality Date   [place] History   Social History     Substance and Sexual Activity   Alcohol Use Yes    Comment: Socially      Social History     Substance and Sexual Activity   Drug Use No     Social History     Tobacco Use   Smoking Status Former Smoker   Smokeless Tobacco Never Used     Family History   Problem Relation Age of Onset    Colon cancer Mother     Coronary artery disease Maternal Grandmother     Coronary artery disease Paternal Grandmother     Hypertension Family     Colon cancer Maternal Grandfather        Meds/Allergies       Current Outpatient Medications:     amitriptyline (ELAVIL) 10 mg tablet    bisacodyl (DULCOLAX) 5 mg EC tablet    calcium carbonate-vitamin D (OSCAL-D) 500 mg-200 units per tablet    dihydroergotamine (MIGRANAL) 4 MG/ML nasal spray    Ergocalciferol 2000 units CAPS    ibuprofen (MOTRIN) 200 mg tablet    LORazepam (ATIVAN) 0 5 mg tablet    Magnesium 100 MG CAPS    naproxen (NAPROSYN) 500 mg tablet    omeprazole (PriLOSEC) 40 MG capsule    Probiotic Product (PROBIOTIC-10 PO)    Triamcinolone Acetonide (NASACORT ALLERGY 24HR) 55 MCG/ACT AERO    Turmeric, Curcuma Longa, (CURCUMIN) POWD    sertraline (ZOLOFT) 25 mg tablet    Allergies   Allergen Reactions    Other      Other reaction(s): rash    Pollen Extract Headache           Objective     Blood pressure 112/76, pulse 83, temperature 98 4 °F (36 9 °C), height 5' (1 524 m), weight 53 7 kg (118 lb 6 4 oz)  Body mass index is 23 12 kg/m²  PHYSICAL EXAM:      General Appearance:   Alert, cooperative, no distress   HEENT:   Normocephalic, atraumatic, anicteric  Neck:  Supple, symmetrical, trachea midline   Lungs:   Clear to auscultation bilaterally; no rales, rhonchi or wheezing; respirations unlabored    Heart[de-identified]   Regular rate and rhythm; no murmur, rub, or gallop     Abdomen:   Soft, non-tender, non-distended; normal bowel sounds; no masses, no organomegaly    Genitalia:   Deferred    Rectal:   No obvious jeb-rectal abscess, fistula or fissure  Possible small hemorrhoid just within the anal canal    Extremities:  No cyanosis, clubbing or edema    Pulses:  2+ and symmetric    Skin:  No jaundice, rashes, or lesions    Lymph nodes:  No palpable cervical lymphadenopathy        Lab Results:   No visits with results within 1 Day(s) from this visit  Latest known visit with results is:   Hospital Outpatient Visit on 07/09/2019   Component Date Value    EXT Preg Test, Ur 07/09/2019 Negative     Control 07/09/2019 Valid     Case Report 07/09/2019                      Value:Surgical Pathology Report                         Case: [id number]                                   Authorizing Provider:  Adriane Albarado MD    Collected:           07/09/2019 1238              Ordering Location:     [address] [place] [address]        Received:            07/09/2019 [facility]                                                     Pathologist:           Saint Bali, MD                                                        Specimens:   A) - Duodenum, random bx , r/o crohns                                                               B) - Stomach, gastric, r/o h pylori                                                                 C) - Esophagogastric junction, r/o barretts                                                         D) - Small Bowel, NOS, terminal illeum, , r/o crohn                                                 E) - Colon, right colon, r/o crohns                                                                                           F) - Colon, transverse, r/o crohns                                                                  G) - Colon, left colon, r/o crohns                                                                  H) - Colon, rectal bx, r/o crohns                                                          Final Diagnosis 07/09/2019                      Value: This result contains rich text formatting which cannot be displayed here   Additional Information 07/09/2019                      Value: This result contains rich text formatting which cannot be displayed here  Leatha Marin Gross Description 07/09/2019                      Value: This result contains rich text formatting which cannot be displayed here  Radiology Results:   No results found

## 2019-10-17 NOTE — TELEPHONE ENCOUNTER
Doesn't look like this Botox was ever ordered back in September  Call [facility] to start STAT fill request  Patient is scheduled on 10/23/19 with Dr Ivelisse Remy in [facility]

## 2019-10-17 NOTE — TELEPHONE ENCOUNTER
Called [facility] and spoke with rep Euceda to initiate STAT fill request for patient's Botox  She sent STAT request for insurance team to verify with need by date of tomorrow 10/18/19  Check back tomorrow on status

## 2019-10-21 NOTE — TELEPHONE ENCOUNTER
Called [facility] to check on status of Botox order  Spoek with rep Paul Carey who stated order is still pending insurance verification and is STAT  She sent STAT e-mail to insurance AGAIN to expedite  Check back this afternoon

## 2019-10-21 NOTE — PROGRESS NOTES
[facility]  Date/Time: 10/23/2019 8:51 AM  Performed by: Arely Boyer MD  Authorized by: Arely Boyer MD     Pre-procedure details:     Prepped With: Alcohol    Anesthesia (see MAR for exact dosages):      Anesthesia method:  None  Procedure details:     Position:  Upright  Botox:     Botox Type:  Type A    Brand:  Botox    Final Concentration per CC:  200 units    Needle Gauge:  30 G 2 5 inch         Botox Procedures: cervical dystonia    Indications: spasmodic torticollis      Injection Location:     Head / Face:  R superior cervical paraspinal, L , L frontalis, R frontalis, R , L inferior occipitalis, R inferior occipitalis, L temporalis, R temporalis, L superior trapezius, R superior trapezius, procerus and L superior cervical paraspinal       L  injection amount:  2 5 unit(s)    R  injection amount: 2  5 unit(s)    Procerus injection amount: 2 5 unit(s)       Left orbicularis oculi - 2 5 units     Right orbicularis oculi - 2 5 units       L lateral frontalis:  2 5 unit(s)more spread out as she felt she had a arch    R lateral frontalis:  2 5 unit(s)    L medial frontalis:  2 5 unit(s)    R medial frontalis: 2 5 unit(s)       L temporalis injection amount:  20 unit(s)    R temporalis injection amount:  20 unit(s)        L inferior occipitalis injection amount:  15 unit(s)    R inferior occipitalis injection amount:  15 unit(s)       L superior cervical paraspinal injection amount:  10 unit(s)    R superior cervical paraspinal injection amount:  10 unit(s)       L superior trapezius injection amount:  15 unit(s)    R superior trapezius injection amount:  15 unit(s)      bilateral parietal region injection amount:  7 5 each side     L sternocleidomastoid, L splenius cervicis, L semispinalis capitis and L horizontal trapezius        L sternocleidomastoid injection amount:   10 unit(s)        R sternocleidomastoid injection amount:   5 unit(s)        L semispinalis capitis injection amount:   0 unit(s)      R semispinalis capitis injection amount:  2 5 unit(s)        L splenius cervicis injection amount:  0 unit(s)      R splenius cervicis injection amount:   2 5 unit(s)        L levator scapulae amount:  10 unit(s)      R Levator scapulae amount:  10unit(s)       Total units used:  200 units  Total units wasted:  0 units     Post-procedure details:   Patient tolerance of procedure:   Tolerated well, no immediate complications

## 2019-10-22 NOTE — TELEPHONE ENCOUNTER
Called [facility] and spoke with rep Marcos Moise who stated order is still pending pharmacy to change days supply and insurance verification  Requested my call to be transferred to supervisor at this point as order is STAT and has not been processed within the 24-48 hours as expected  Spoke with supervisor Reggie Amaya who stated she agrees that order should have been processed and she reached out to  working on order to expedite  Reggie Amaya stated if I do not hear anything within 1 hour I should call back

## 2019-10-22 NOTE — TELEPHONE ENCOUNTER
Senior supervisor José returned my call  He was able to have insurance benefits verified  Need patient consent  José did call patient and left VM  I also left patient VM  Pre scheduled delivery of Botox 200 unit SDV quantity of 1 to arrive tomorrow to [facility] office priority overnight with signature required  Called patient's  Yadiel Graham to discuss  He is agreeable to call patient to have her call by 5 pm today  Provided phone # to [facility]

## 2019-10-22 NOTE — TELEPHONE ENCOUNTER
Called [facility] and spoke with rep Drake to check on status of Botox  She stated order is still pending insurance verification  Asked for my call to be transferred again to supervisor  Call transferred to senior supervisor José to discuss  He agrees again order is taking too long to process  P A is on file and just needs to be verified  He will work with insurance supervisor to get order verified and call patient for consent   Should receive call back no later then   5:00 pm

## 2019-10-23 ENCOUNTER — PROCEDURE VISIT (OUTPATIENT)
Dept: NEUROLOGY | Facility: CLINIC | Age: 44
End: 2019-10-23
Payer: COMMERCIAL

## 2019-10-23 VITALS — SYSTOLIC BLOOD PRESSURE: 113 MMHG | DIASTOLIC BLOOD PRESSURE: 67 MMHG | HEART RATE: 79 BPM | TEMPERATURE: 97.6 F

## 2019-10-23 DIAGNOSIS — G24.3 CERVICAL DYSTONIA: Primary | ICD-10-CM

## 2019-10-23 PROCEDURE — 64616 CHEMODENERV MUSC NECK DYSTON: CPT | Performed by: PSYCHIATRY & NEUROLOGY

## 2019-10-23 NOTE — TELEPHONE ENCOUNTER
[employer] [employer] and spoke with rep Alaina Roberts who confirmed delivery is guaranteed to be delivered by [employer] today 10/23/19 by 10:30 am  I will await delivery

## 2019-11-04 ENCOUNTER — OFFICE VISIT (OUTPATIENT)
Dept: GASTROENTEROLOGY | Facility: MEDICAL CENTER | Age: 44
End: 2019-11-04
Payer: COMMERCIAL

## 2019-11-04 VITALS
DIASTOLIC BLOOD PRESSURE: 68 MMHG | SYSTOLIC BLOOD PRESSURE: 116 MMHG | HEART RATE: 84 BPM | BODY MASS INDEX: 23.24 KG/M2 | WEIGHT: 119 LBS | TEMPERATURE: 97.7 F

## 2019-11-04 DIAGNOSIS — K31.A0 INTESTINAL METAPLASIA OF GASTRIC MUCOSA: ICD-10-CM

## 2019-11-04 DIAGNOSIS — K62.9 ANAL DISORDER: Primary | ICD-10-CM

## 2019-11-04 DIAGNOSIS — K21.9 GASTROESOPHAGEAL REFLUX DISEASE, ESOPHAGITIS PRESENCE NOT SPECIFIED: ICD-10-CM

## 2019-11-04 PROCEDURE — 99244 OFF/OP CNSLTJ NEW/EST MOD 40: CPT | Performed by: INTERNAL MEDICINE

## 2019-11-04 RX ORDER — OMEPRAZOLE 40 MG/1
40 CAPSULE, DELAYED RELEASE ORAL
Qty: 30 CAPSULE | Refills: 3 | Status: SHIPPED | OUTPATIENT
Start: 2019-11-04 | End: 2020-07-06

## 2019-11-04 NOTE — PROGRESS NOTES
[facility] - Outpatient Consultation  Natasha Vasquez 44 y o  female MRN: [id number]  Encounter: [id number]          ASSESSMENT AND PLAN:    Natasha Vasquez is a 44 y o  female who presents with complaint of jeb-anal fistula now resolved on recent EUA  Overall she is much improved and doing well  Still with some nausea for which she is on omeprazole  Available labs and imaging reviewed  Overall she is doing much better  1  Anal disorder    2  Intestinal metaplasia of gastric mucosa    3  Gastroesophageal reflux disease, esophagitis presence not specified        No orders of the defined types were placed in this encounter  -- Follow-up with Dr Marilee Leon  -- Continue PPI for now, and plan to taper off in the future  -- EGD Summer 2020 for gastric IM mapping biopsies, followed by placement in a surveillance program    ______________________________________________________________________    HPI:    Natasha Vasquez is a 44 y o  female who presents with complaint of anal fistula  She underwent an EUA with Dr Marilee Leon and it was okay  The tissue was granulated where the ulcer was; he removed it and cauterized it  Dr Marilee Leon thought the MRI revealed scar tissue and not a true fistula  It has only been a week since  She had some bleeding, but it has gotten less since the procedure  She has occasional discharge and some pain  She continues to have mystery pain that comes and goes  Digestion has been a bit off last week with intermittent nausea  She has not been taking much pain medications  She takes metamucil and 2 stool softeners and has been having regular BMs   She is taking omeprazole every day      REVIEW OF SYSTEMS:  10 point ROS reviewed and negative, except as above    Historical Information   Past Medical History:   Diagnosis Date    Anal fissure     Migraine      Past Surgical History:   Procedure Laterality Date    HEMORRHOID SURGERY      VARICOSE VEIN SURGERY       Social History Social History     Substance and Sexual Activity   Alcohol Use Yes    Comment: Socially      Social History     Substance and Sexual Activity   Drug Use No     Social History     Tobacco Use   Smoking Status Former Smoker   Smokeless Tobacco Never Used     Family History   Problem Relation Age of Onset    Colon cancer Mother     Coronary artery disease Maternal Grandmother     Coronary artery disease Paternal Grandmother     Hypertension Family     Colon cancer Maternal Grandfather        Meds/Allergies       Current Outpatient Medications:     amitriptyline (ELAVIL) 10 mg tablet    bisacodyl (DULCOLAX) 5 mg EC tablet    calcium carbonate-vitamin D (OSCAL-D) 500 mg-200 units per tablet    dihydroergotamine (MIGRANAL) 4 MG/ML nasal spray    Ergocalciferol 2000 units CAPS    ibuprofen (MOTRIN) 200 mg tablet    LORazepam (ATIVAN) 0 5 mg tablet    Magnesium 100 MG CAPS    naproxen (NAPROSYN) 500 mg tablet    omeprazole (PriLOSEC) 40 MG capsule    Probiotic Product (PROBIOTIC-10 PO)    sertraline (ZOLOFT) 25 mg tablet    Triamcinolone Acetonide (NASACORT ALLERGY 24HR) 55 MCG/ACT AERO    Turmeric, Curcuma Longa, (CURCUMIN) POWD    Allergies   Allergen Reactions    Other      Other reaction(s): rash    Pollen Extract Headache           Objective     Blood pressure 116/68, pulse 84, temperature 97 7 °F (36 5 °C), temperature source Tympanic, weight 54 kg (119 lb)  Body mass index is 23 24 kg/m²  PHYSICAL EXAM:      General Appearance:   Alert, cooperative, no distress   HEENT:   Normocephalic, atraumatic, anicteric  Neck:  Supple, symmetrical, trachea midline   Lungs:   Clear to auscultation bilaterally; no rales, rhonchi or wheezing; respirations unlabored    Heart[de-identified]   Regular rate and rhythm; no murmur, rub, or gallop     Abdomen:   Soft, non-tender, non-distended; normal bowel sounds; no masses, no organomegaly    Genitalia:   Deferred    Rectal:   Deferred    Extremities:  No cyanosis, clubbing or edema    Pulses:  2+ and symmetric    Skin:  No jaundice, rashes, or lesions    Lymph nodes:  No palpable cervical lymphadenopathy        Lab Results:   No visits with results within 1 Day(s) from this visit  Latest known visit with results is:   Hospital Outpatient Visit on 07/09/2019   Component Date Value    EXT Preg Test, Ur 07/09/2019 Negative     Control 07/09/2019 Valid     Case Report 07/09/2019                      Value:Surgical Pathology Report                         Case: [id number]                                   Authorizing Provider:  Sammi Schroeder MD    Collected:           07/09/2019 1238              Ordering Location:     [address] [place] [address]        Received:            07/09/2019 1305 [facility]                                                     Pathologist:           Ava Caba MD                                                        Specimens:   A) - Duodenum, random bx , r/o crohns                                                               B) - Stomach, gastric, r/o h pylori                                                                 C) - Esophagogastric junction, r/o barretts                                                         D) - Small Bowel, NOS, terminal illeum, , r/o crohn                                                 E) - Colon, right colon, r/o crohns                                                                                           F) - Colon, transverse, r/o crohns                                                                  G) - Colon, left colon, r/o crohns                                                                  H) - Colon, rectal bx, r/o crohns                                                          Final Diagnosis 07/09/2019                      Value: This result contains rich text formatting which cannot be displayed here      Additional Information 07/09/2019 Value:This result contains rich text formatting which cannot be displayed here  Jeana Esquivel Gross Description 07/09/2019                      Value: This result contains rich text formatting which cannot be displayed here  Radiology Results:   No results found

## 2019-12-05 DIAGNOSIS — G43.009 MIGRAINE WITHOUT AURA AND WITHOUT STATUS MIGRAINOSUS, NOT INTRACTABLE: ICD-10-CM

## 2019-12-06 RX ORDER — AMITRIPTYLINE HYDROCHLORIDE 10 MG/1
TABLET, FILM COATED ORAL
Qty: 60 TABLET | Refills: 6 | Status: SHIPPED | OUTPATIENT
Start: 2019-12-06 | End: 2020-07-17

## 2020-01-07 ENCOUNTER — TELEPHONE (OUTPATIENT)
Dept: NEUROLOGY | Facility: CLINIC | Age: 45
End: 2020-01-07

## 2020-01-07 NOTE — TELEPHONE ENCOUNTER
General 01/06/2020  4:31 PM Lexie Steward MA CARE COORDINATION -   Note    BOTOX 200 UNITS (VISIT# 3) - CASE- [id number]   4 VISITS, AV- 05/20/19 TILL 05/19/20    [facility]

## 2020-01-08 ENCOUNTER — OFFICE VISIT (OUTPATIENT)
Dept: GASTROENTEROLOGY | Facility: MEDICAL CENTER | Age: 45
End: 2020-01-08
Payer: COMMERCIAL

## 2020-01-08 VITALS
HEIGHT: 60 IN | TEMPERATURE: 98.6 F | SYSTOLIC BLOOD PRESSURE: 140 MMHG | DIASTOLIC BLOOD PRESSURE: 90 MMHG | HEART RATE: 90 BPM | BODY MASS INDEX: 23.16 KG/M2 | WEIGHT: 118 LBS

## 2020-01-08 DIAGNOSIS — K62.5 RECTAL BLEEDING: ICD-10-CM

## 2020-01-08 DIAGNOSIS — R10.84 GENERALIZED ABDOMINAL PAIN: ICD-10-CM

## 2020-01-08 DIAGNOSIS — K29.70 GASTRITIS WITHOUT BLEEDING, UNSPECIFIED CHRONICITY, UNSPECIFIED GASTRITIS TYPE: ICD-10-CM

## 2020-01-08 DIAGNOSIS — K60.2 FISSURE, ANAL: Primary | ICD-10-CM

## 2020-01-08 PROCEDURE — 99214 OFFICE O/P EST MOD 30 MIN: CPT | Performed by: PHYSICIAN ASSISTANT

## 2020-01-08 NOTE — PROGRESS NOTES
Assessment/Plan:     Diagnoses and all orders for this visit:    Fissure, anal  Rectal bleeding  Patient has had a chronic fistula with intermittent rectal bleeding  She typically takes MiraLax for her bowels however more recently has been having diarrhea which likely inflamed this area  She has been using nifedipine without relief  She has also had botox in the past  She has been seen by at least 3 colorectal surgeons, the most recent of which felt that this was not a surgical issue  We discussed a trial of nitroglycerin as she has not used this in the past and hopefully this will help with healing  I also encouraged her to follow-up with Dr Xiomara Strange who she has seen in the past   -I called [address] as this typically needs to be a compounded medicine  She will get 0 4 nitroglycerin mixed with 2% lidocaine to insert rectally b i d  Generalized abdominal pain  She has chronic abdominal pain  We did discuss possibly increasing her amitriptyline to help with this however she is hesitant to do so as it does make her very lethargic despite taking it at bedtime  We also discussed Bentyl to help with spasming however she is concerned that this may cause her to have constipation and worsen her symptoms  Given her colonoscopy was negative for inflammatory bowel disease she likely has irritable bowel syndrome  I feel that her anxiety is contributing greatly to her symptoms and hopefully if we can treat her fissure some of this may improve on its own  Gastritis without bleeding, unspecified chronicity, unspecified gastritis type  She had an EGD which appeared normal but did show gastritis  She is on omeprazole with good control of her heartburn  Will see her back on a short follow-up in the next several weeks with Dr Evelio Gil to reassess her symptoms  Subjective:      Patient ID: Delta Gonzales is a 38 y o  female      HPI     This is a follow-up for anal fissure, rectal bleeding, abdominal pain    Patient has a extensive past medical history  She states this started with a hemorrhoidectomy with poor wound healing  She also had a perianal fistula with abscess  She was initially seen by Dr karimi who did the surgery  She then was referred to Dr Valery Mancera at [facility] who recommended colonoscopy  Calprotectin was negative  Colonoscopy performed in July was within normal limits with the except of an anal fissure  Biopsies were negative for microscopic colitis and IBD  She states typically she has constipation which is managed with MiraLax however more recently she has been having diarrhea  She states this has inflamed the area and is now having bleeding, typically with bowel movements  She also complains of anal discharge  She has been using nifedipine without relief  She did see Dr Valery Mancera again in November  She had an anoscopy performed in the office which again showed a right posterior ulcer but no signs of fistula  She also had an MRI which showed that this area appeared more as scar tissue and not a true fistula  She was referred back to GI as this was no longer felt to be a surgical issue  She continues to complain of generalized abdominal pain/cramping  She is on amitriptyline for her headaches and feels that this has not helped with her pain at all  She is on a probiotic daily  She also takes omeprazole 40 mg  She did undergo endoscopy in July which was within normal limits  Biopsies were negative for H pylori and celiac however did show chronic gastritis and intestinal metaplasia      Patient Active Problem List   Diagnosis    Allergic rhinitis    Nasal septal deviation    Generalized anxiety disorder    Family history of colon cancer in mother    Encounter for physical examination    Cervical dystonia    Migraine without aura and without status migrainosus, not intractable    Perirectal abscess    Fistula-in-ano    Fissure, anal    Generalized abdominal pain    Rectal bleeding    Gastritis     Allergies   Allergen Reactions    Other      Other reaction(s): rash    Pollen Extract Headache     Current Outpatient Medications on File Prior to Visit   Medication Sig    amitriptyline (ELAVIL) 10 mg tablet take 2 tablets by mouth at bedtime    bisacodyl (DULCOLAX) 5 mg EC tablet Take 5 mg by mouth daily     calcium carbonate-vitamin D (OSCAL-D) 500 mg-200 units per tablet Take 2 tablets by mouth daily    dihydroergotamine (MIGRANAL) 4 MG/ML nasal spray 1 spray into each nostril as needed Use in one nostril as directed  No more than 4 sprays in one hour    Ergocalciferol 2000 units CAPS Take 2,000 Units by mouth daily    ibuprofen (MOTRIN) 200 mg tablet Take by mouth every 6 (six) hours as needed for mild pain    LORazepam (ATIVAN) 0 5 mg tablet Take 1 tablet (0 5 mg total) by mouth daily as needed for anxiety    Magnesium 100 MG CAPS Take 1 capsule by mouth daily     naproxen (NAPROSYN) 500 mg tablet     omeprazole (PriLOSEC) 40 MG capsule Take 1 capsule (40 mg total) by mouth daily before breakfast    Probiotic Product (PROBIOTIC-10 PO) Take 1 tablet by mouth daily     sertraline (ZOLOFT) 25 mg tablet Take 1 tablet (25 mg total) by mouth daily    Triamcinolone Acetonide (NASACORT ALLERGY 24HR) 55 MCG/ACT AERO into each nostril    Turmeric, Curcuma Longa, (CURCUMIN) POWD Take 1 tablet by mouth daily      No current facility-administered medications on file prior to visit        Family History   Problem Relation Age of Onset    Colon cancer Mother     Coronary artery disease Maternal Grandmother     Coronary artery disease Paternal Grandmother     Hypertension Family     Colon cancer Maternal Grandfather      Past Medical History:   Diagnosis Date    Anal fissure     Migraine      Social History     Socioeconomic History    Marital status: /Civil Union     Spouse name: None    Number of children: None    Years of education: None    Highest education level: None   Occupational History    None   Social Needs    Financial resource strain: None    Food insecurity:     Worry: None     Inability: None    Transportation needs:     Medical: None     Non-medical: None   Tobacco Use    Smoking status: Former Smoker    Smokeless tobacco: Never Used   Substance and Sexual Activity    Alcohol use: Yes     Comment: Socially     Drug use: No    Sexual activity: None   Lifestyle    Physical activity:     Days per week: None     Minutes per session: None    Stress: None   Relationships    Social connections:     Talks on phone: None     Gets together: None     Attends [employer] service: None     Active member of club or organization: None     Attends meetings of clubs or organizations: None     Relationship status: None    Intimate partner violence:     Fear of current or ex partner: None     Emotionally abused: None     Physically abused: None     Forced sexual activity: None   Other Topics Concern    None   Social History Narrative    None     Past Surgical History:   Procedure Laterality Date    COLONOSCOPY  07/2019    HEMORRHOID SURGERY      UPPER GASTROINTESTINAL ENDOSCOPY  07/2019    VARICOSE VEIN SURGERY           Review of Systems   All other systems reviewed and are negative  Objective:      /90 (BP Location: Right arm, Patient Position: Sitting, Cuff Size: Adult)   Pulse 90   Temp 98 6 °F (37 °C) (Tympanic)   Ht 5' (1 524 m)   Wt 53 5 kg (118 lb)   BMI 23 05 kg/m²          Physical Exam   Constitutional: She is oriented to person, place, and time  She appears well-developed and well-nourished  HENT:   Head: Normocephalic and atraumatic  Eyes: Conjunctivae and EOM are normal    Neck: Normal range of motion  Cardiovascular: Normal rate and regular rhythm  Pulmonary/Chest: Effort normal and breath sounds normal    Abdominal: Soft  Bowel sounds are normal  She exhibits no distension   There is tenderness (Mild tenderness palpation diffusely)  Genitourinary:   Genitourinary Comments: Rectal exam was performed  No a fistulous track was seen, no drainage, or bleeding  Digital rectal exam did reveal an area of tenderness at approximately 6:00 which felt raised  Musculoskeletal: Normal range of motion  Neurological: She is alert and oriented to person, place, and time  Skin: Skin is warm and dry     Psychiatric:   She is extremely tearful throughout the exam

## 2020-01-10 ENCOUNTER — TELEPHONE (OUTPATIENT)
Dept: GASTROENTEROLOGY | Facility: MEDICAL CENTER | Age: 45
End: 2020-01-10

## 2020-01-10 NOTE — TELEPHONE ENCOUNTER
----- Message from Natalya Restrepo PA-C sent at 1/8/2020  1:32 PM EST -----  Please call pt & let her know I called [facility] pharmacy to have the rectal ointment made  Also please have her schedule a f/u with Dr Nidhi Cummings in 1-3 weeks  He said ok to overbook    Dipika Guess

## 2020-01-10 NOTE — TELEPHONE ENCOUNTER
Spoke to pt aware of medication at pharmacy, she needs a follow up appointment, ok to overbook per Dr Bart Champagne

## 2020-01-13 NOTE — TELEPHONE ENCOUNTER
Called [facility] and spoke to Neli - she informed me that Botox is ready to ship, however pharmacy needs pt's consent to ship  I conferenced the pt on the call and she gave consent to ship  Botox delivery confirmed for Thursday 1/16/20 via [employer] priority signature required to [facility] location [address]    Please await Botox delivery  Thank you!     Adele

## 2020-01-16 NOTE — TELEPHONE ENCOUNTER
Botox number of units: 200 units   Botox quantity: 1  Arrived at what location: [facility]  Lot number: [id number]    Placed in [address]  and logged

## 2020-01-27 ENCOUNTER — OFFICE VISIT (OUTPATIENT)
Dept: GASTROENTEROLOGY | Facility: MEDICAL CENTER | Age: 45
End: 2020-01-27
Payer: COMMERCIAL

## 2020-01-27 VITALS
TEMPERATURE: 97.8 F | SYSTOLIC BLOOD PRESSURE: 124 MMHG | HEART RATE: 84 BPM | BODY MASS INDEX: 24.42 KG/M2 | WEIGHT: 124.4 LBS | HEIGHT: 60 IN | DIASTOLIC BLOOD PRESSURE: 70 MMHG

## 2020-01-27 DIAGNOSIS — K29.70 GASTRITIS WITHOUT BLEEDING, UNSPECIFIED CHRONICITY, UNSPECIFIED GASTRITIS TYPE: ICD-10-CM

## 2020-01-27 DIAGNOSIS — K61.1 PERIRECTAL ABSCESS: ICD-10-CM

## 2020-01-27 DIAGNOSIS — K62.5 RECTAL BLEEDING: ICD-10-CM

## 2020-01-27 DIAGNOSIS — K60.3 FISTULA-IN-ANO: ICD-10-CM

## 2020-01-27 DIAGNOSIS — K21.9 GASTROESOPHAGEAL REFLUX DISEASE, ESOPHAGITIS PRESENCE NOT SPECIFIED: Primary | ICD-10-CM

## 2020-01-27 DIAGNOSIS — K60.2 FISSURE, ANAL: ICD-10-CM

## 2020-01-27 PROCEDURE — 99215 OFFICE O/P EST HI 40 MIN: CPT | Performed by: INTERNAL MEDICINE

## 2020-01-27 PROCEDURE — 3008F BODY MASS INDEX DOCD: CPT | Performed by: INTERNAL MEDICINE

## 2020-01-27 PROCEDURE — 1036F TOBACCO NON-USER: CPT | Performed by: INTERNAL MEDICINE

## 2020-01-27 NOTE — PATIENT INSTRUCTIONS
Vedolizumab (By injection)   Vedolizumab (gl-[id number]-MKH-[id number]-mab)  Treats Crohn disease and ulcerative colitis  Brand Name(s): Entyvio   There may be other brand names for this medicine  When This Medicine Should Not Be Used: This medicine is not right for everyone  Do not use it if you had an allergic reaction to vedolizumab  How to Use This Medicine:   Injectable  · Your doctor will prescribe your dose and schedule  This medicine is given through a needle placed in a vein  · A nurse or other health provider will give you this medicine  · This medicine should come with a Medication Guide  Ask your pharmacist for a copy if you do not have one  · Missed dose: You must use this medicine on a fixed schedule  Call your doctor or pharmacist if you miss a dose  Drugs and Foods to Avoid:   Ask your doctor or pharmacist before using any other medicine, including over-the-counter medicines, vitamins, and herbal products  · Some medicines can affect how vedolizumab works  Tell your doctor if you are using natalizumab or a tumor necrosis factor (TNF) blocker such as adalimumab, certolizumab, etanercept, or infliximab  · This medicine may interfere with vaccines  Ask your doctor before you get a flu shot or any other vaccines  Warnings While Using This Medicine:   · Tell your doctor if you are pregnant or breastfeeding, or if you have liver disease or an infection  · This medicine may cause the following problems:  ¨ Higher risk of infection (including bacteria, virus, or fungus infection)  ¨ Risk of progressive multifocal leukoencephalopathy (brain disease)  ¨ Liver disease  · Your doctor will do lab tests at regular visits to check on the effects of this medicine  Keep all appointments    Possible Side Effects While Using This Medicine:   Call your doctor right away if you notice any of these side effects:  · Allergic reaction: Itching or hives, swelling in your face or hands, swelling or tingling in your mouth or throat, chest tightness, trouble breathing  · Blurred vision, confusion, dizziness, drowsiness, or unusual tiredness or weakness  · Change in how much or how often you urinate, bloody or cloudy urine, painful urination  · Dark urine or pale stools, nausea, vomiting, loss of appetite, stomach pain, yellow skin or eyes  · Fast, pounding, or uneven heartbeat  · Fever, chills, cough, runny or stuffy nose, sore throat, and body aches  If you notice these less serious side effects, talk with your doctor:   · Headache  · Joint pain  If you notice other side effects that you think are caused by this medicine, tell your doctor  Call your doctor for medical advice about side effects  You may report side effects to FDA at 3-139-FDA-5894  © 2017 [address] Information is for End User's use only and may not be sold, redistributed or otherwise used for commercial purposes  The above information is an  only  It is not intended as medical advice for individual conditions or treatments  Talk to your doctor, nurse or pharmacist before following any medical regimen to see if it is safe and effective for you

## 2020-01-27 NOTE — PROGRESS NOTES
[facility] - Outpatient Follow-up Note  Jas Rust 38 y o  female MRN: [id number]  Encounter: [id number]          ASSESSMENT AND PLAN:    Jas Rust is a 38 y o  female who presents with complaint of jeb-anal fissure which has persisted, and prior fistula which has mostly resolved  Suspect this long healing was related to her prior hemorrhoidectomy and does not represent isolated jeb-anal disease from Crohn's, although it remains a small possibility  She also has GERD and is on omeprazole, mostly doing well but with return of symptoms when she misses a dose  Available labs and imaging reviewed  1  Gastroesophageal reflux disease, esophagitis presence not specified    2  Fistula-in-ano    3  Fissure, anal    4  Rectal bleeding    5  Gastritis without bleeding, unspecified chronicity, unspecified gastritis type    6  Perirectal abscess        No orders of the defined types were placed in this encounter  -- This is how I would recommend weaning the omeprazole:   Week 1 and 2 - Alternate 40mg a day and 20 mg a day (Sunday 40mg, Monday 20mg, Tuesday 40mg, etc  )   Week 3 and 4 - Take 20mg a day   Week 5 and 6 - Take 20mg every other day (Sunday 20mg, Monday skip, Tuesday 20mg, Wednesday skip, etc )   Week 7 and 8 - Take 20mg twice a week (Monday and Thursday)   Week 9 - Off   -- If at any point you feel an increase in symptoms, go back up 2 levels in the medication you are taking  You can take as needed Gaviscon or Famotidine for breakthrough symptoms  -- I will send the script for the 20mg of omeprazole     -- Trial of Kegel exercises  -- Continue nitro ointment for anal fissure  -- We discussed consideration of Vedolizumab in the future for possible isolated fissure related to Crohn's, although I still believe this is less likely  -- Make sure stools remain soft and easy to pass, with over the counter laxatives as needed  -- Follow-up with [facility]       Below is some information on kegel exercises  Kegel Exercises for Women   AMBULATORY CARE:   Kegel exercises  help strengthen your pelvic muscles  Pelvic muscles hold your pelvic organs, such as your bladder and uterus, in place  Kegel exercises help prevent or control problems with urine incontinence (leakage)  Incontinence may be caused by pregnancy, childbirth, or menopause  Contact your healthcare provider if:   · You cannot feel your muscles tighten or relax  · You continue to leak urine  · You have questions or concerns about your condition or care  Use the correct muscles:  Pelvic muscles are the muscles you use to control urine flow  To target these muscles, stop and start the flow of urine several times  This will help you become familiar with how it feels to tighten and relax these muscles  How to do Kegel exercises:   · Empty your bladder  You may lie down, stand up, or sit down to do these exercises  When you first try to do these exercises, it may be easier if you lie down  Tighten or squeeze your pelvic muscles slowly  It may feel like you are trying to hold back urine or gas  Hold this position for 3 seconds  Relax for 3 seconds  Repeat this cycle 10 times  · Do 10 sets of Kegel exercises, at least 3 times a day  Do not hold your breath when you do Kegel exercises  Keep your stomach, back, and leg muscles relaxed  · As your muscles get stronger, you will be able to hold the squeeze longer  Your healthcare provider may ask that you increase your pelvic muscle squeeze to 10 seconds  After you squeeze for 10 seconds, relax for 10 seconds  What else you should know:   · Once you know how to do Kegel exercises, use different positions  You can do these exercises while you lie on the floor, sit at your desk or watch TV, and while you stand  · You may notice improved bladder control within about 6 weeks  · Tighten your pelvic muscles before you sneeze, cough, or lift to prevent urine leakage  Follow up with your healthcare provider as directed:  Write down your questions so you remember to ask them during your visits  © 2017 [address] [employer] [employer] Information is for End User's use only and may not be sold, redistributed or otherwise used for commercial purposes  All illustrations and images included in CareNotes® are the copyrighted property of A D [employer] M [employer]  or Dawit Valenzuela  The above information is an  only  It is not intended as medical advice for individual conditions or treatments  Talk to your doctor, nurse or pharmacist before following any medical regimen to see if it is safe and effective for you  I have spent 40 minutes with Patient  today in which greater than 50% of this time was spent in counseling/coordination of care regarding Diagnostic results, Prognosis, Risks and benefits of tx options, Intructions for management, Patient and family education and Impressions  ______________________________________________________________________    SUBJECTIVE:    Ester Rivera is a 38 y o  female who presents with complaint of GERD and fissure  Saw Dr Estrella Cunningham, and had cautery of the fissure  Felt well and then 3 weeks in it opened up again  She did a follow-up with Dr Estrella Cunningham and he is like the discharge and pain do not match up  A repeat MRI showed a fistula back on the left side  She tried the nitroglycerin and she does not know what else to do  She got the stomach flu right after New Years, with abdominal pain and diarrhea  It made the situation much worse  She has some good and bad days  She has some blood with her BMs every day  She has days with notable pain  She tried sitz baths  She does not bleed in between BMs  She has a discharge which persists and she keeps guaze there  Heartburn improved on omeprazole  REVIEW OF SYSTEMS IS OTHERWISE NEGATIVE    10 point ROS reviewed and negative, except as above      Historical Information   Past Medical History:   Diagnosis Date    Anal fissure     Migraine      Past Surgical History:   Procedure Laterality Date    COLONOSCOPY  07/2019    HEMORRHOID SURGERY      UPPER GASTROINTESTINAL ENDOSCOPY  07/2019    VARICOSE VEIN SURGERY       Social History   Social History     Substance and Sexual Activity   Alcohol Use Yes    Comment: Socially      Social History     Substance and Sexual Activity   Drug Use No     Social History     Tobacco Use   Smoking Status Former Smoker   Smokeless Tobacco Never Used     Family History   Problem Relation Age of Onset    Colon cancer Mother     Coronary artery disease Maternal Grandmother     Coronary artery disease Paternal Grandmother     Hypertension Family     Colon cancer Maternal Grandfather        Meds/Allergies       Current Outpatient Medications:     amitriptyline (ELAVIL) 10 mg tablet    bisacodyl (DULCOLAX) 5 mg EC tablet    calcium carbonate-vitamin D (OSCAL-D) 500 mg-200 units per tablet    dihydroergotamine (MIGRANAL) 4 MG/ML nasal spray    Ergocalciferol 2000 units CAPS    ibuprofen (MOTRIN) 200 mg tablet    LORazepam (ATIVAN) 0 5 mg tablet    Magnesium 100 MG CAPS    naproxen (NAPROSYN) 500 mg tablet    omeprazole (PriLOSEC) 40 MG capsule    Probiotic Product (PROBIOTIC-10 PO)    sertraline (ZOLOFT) 25 mg tablet    Triamcinolone Acetonide (NASACORT ALLERGY 24HR) 55 MCG/ACT AERO    Turmeric, Curcuma Longa, (CURCUMIN) POWD    omeprazole (PriLOSEC) 20 mg delayed release capsule    Allergies   Allergen Reactions    Other      Other reaction(s): rash    Pollen Extract Headache           Objective     Blood pressure 124/70, pulse 84, temperature 97 8 °F (36 6 °C), height 5' (1 524 m), weight 56 4 kg (124 lb 6 4 oz)  Body mass index is 24 3 kg/m²  PHYSICAL EXAM:      General Appearance:   Alert, cooperative, no distress   HEENT:   Normocephalic, atraumatic, anicteric       Neck:  Supple, symmetrical, trachea midline   Lungs:   Clear to auscultation bilaterally; no rales, rhonchi or wheezing; respirations unlabored    Heart[de-identified]   Regular rate and rhythm; no murmur, rub, or gallop  Abdomen:   Soft, non-tender, non-distended; normal bowel sounds; no masses, no organomegaly    Genitalia:   Deferred    Rectal:   Deferred    Extremities:  No cyanosis, clubbing or edema    Pulses:  2+ and symmetric    Skin:  No jaundice, rashes, or lesions    Lymph nodes:  No palpable cervical lymphadenopathy        Lab Results:   No visits with results within 1 Day(s) from this visit     Latest known visit with results is:   Hospital Outpatient Visit on 07/09/2019   Component Date Value    EXT Preg Test, Ur 07/09/2019 Negative     Control 07/09/2019 Valid     Case Report 07/09/2019                      Value:Surgical Pathology Report                         Case: [id number]                                   Authorizing Provider:  Wade Magana MD    Collected:           07/09/2019 1238              Ordering Location:     [address] [place] [address]        Received:            07/09/2019 [facility]                                                     Pathologist:           Suzanne Lester MD                                                        Specimens:   A) - Duodenum, random bx , r/o crohns                                                               B) - Stomach, gastric, r/o h pylori                                                                 C) - Esophagogastric junction, r/o barretts                                                         D) - Small Bowel, NOS, terminal illeum, , r/o crohn                                                 E) - Colon, right colon, r/o crohns                                                                                           F) - Colon, transverse, r/o crohns                                                                  G) - Colon, left colon, r/o crohns H) - Colon, rectal bx, r/o crohns                                                          Final Diagnosis 07/09/2019                      Value: This result contains rich text formatting which cannot be displayed here   Additional Information 07/09/2019                      Value: This result contains rich text formatting which cannot be displayed here  Shruthi Monson Gross Description 07/09/2019                      Value: This result contains rich text formatting which cannot be displayed here  Lab Results   Component Value Date    WBC 4 82 06/18/2019    HGB 12 6 06/18/2019    HCT 38 5 06/18/2019    MCV 95 06/18/2019     06/18/2019       Lab Results   Component Value Date    SODIUM 139 06/18/2019    K 3 9 06/18/2019     06/18/2019    CO2 31 06/18/2019    AGAP 3 (L) 06/18/2019    BUN 10 06/18/2019    CREATININE 0 83 06/18/2019    GLUC 74 06/18/2019    GLUF 74 10/02/2018    CALCIUM 8 9 06/18/2019    AST 8 06/18/2019    ALT 20 06/18/2019    ALKPHOS 31 (L) 06/18/2019    TP 6 9 06/18/2019    TBILI 0 41 06/18/2019    EGFR 87 06/18/2019       Lab Results   Component Value Date    CRP <3 0 06/18/2019       Lab Results   Component Value Date    PAO1TDXBNYBC 1 470 06/18/2019       No results found for: IRON, TIBC, FERRITIN    Radiology Results:   No results found

## 2020-01-30 RX ORDER — OMEPRAZOLE 20 MG/1
20 CAPSULE, DELAYED RELEASE ORAL DAILY
Qty: 30 CAPSULE | Refills: 2 | Status: SHIPPED | OUTPATIENT
Start: 2020-01-30 | End: 2020-05-09 | Stop reason: SDUPTHER

## 2020-02-05 ENCOUNTER — PROCEDURE VISIT (OUTPATIENT)
Dept: NEUROLOGY | Facility: CLINIC | Age: 45
End: 2020-02-05
Payer: COMMERCIAL

## 2020-02-05 VITALS — TEMPERATURE: 97.9 F | SYSTOLIC BLOOD PRESSURE: 116 MMHG | HEART RATE: 91 BPM | DIASTOLIC BLOOD PRESSURE: 66 MMHG

## 2020-02-05 DIAGNOSIS — G24.3 CERVICAL DYSTONIA: Primary | ICD-10-CM

## 2020-02-05 PROCEDURE — 64615 CHEMODENERV MUSC MIGRAINE: CPT | Performed by: PSYCHIATRY & NEUROLOGY

## 2020-02-05 NOTE — PROGRESS NOTES
Chemodenervation  Date/Time: 2/5/2020 8:24 AM  Performed by: Gayathri Xavier MD  Authorized by: Gayathri Xavier MD     Pre-procedure details:     Prepped With: Alcohol    Anesthesia (see MAR for exact dosages): Anesthesia method:  None  Procedure details:     Position:  Upright  Botox:     Botox Type:  Type A    Brand:  Botox    mL's of Botulinum Toxin:  200    Final Concentration per CC:  200 units    Needle Gauge:  30 G 2 5 inch  Total Units:     Total units used:  200    Total units discarded:  0  Post-procedure details:     Chemodenervation:  Chronic migraine    Facial Nerve Location[de-identified]  Bilateral facial nerve    Patient tolerance of procedure:   Tolerated well, no immediate complications         Botox Procedures: cervical dystonia    Indications: spasmodic torticollis       Injection Location:     Head / Face:  R superior cervical paraspinal, L , L frontalis, R frontalis, R , L inferior occipitalis, R inferior occipitalis, L temporalis, R temporalis, L superior trapezius, R superior trapezius, procerus and L superior cervical paraspinal       L  injection amount:  2 5 unit(s)    R  injection amount: 2  5 unit(s)    Procerus injection amount: 2 5 unit(s)       Left orbicularis oculi - 2 5 units     Right orbicularis oculi - 2 5 units        L lateral frontalis:  2 5 unit(s)more spread out as she felt she had a arch    R lateral frontalis:  2 5 unit(s)    L medial frontalis:  2 5 unit(s)    R medial frontalis: 2 5 unit(s)       L temporalis injection amount:  20 unit(s)    R temporalis injection amount:  20 unit(s)        L inferior occipitalis injection amount:  15 unit(s)    R inferior occipitalis injection amount:  15 unit(s)       L superior cervical paraspinal injection amount:  10 unit(s)    R superior cervical paraspinal injection amount:  10 unit(s)       L superior trapezius injection amount:  15 unit(s)    R superior trapezius injection amount:  15 unit(s)       bilateral parietal region injection amount:  7 5 each side      L sternocleidomastoid, L splenius cervicis, L semispinalis capitis and L horizontal trapezius        L sternocleidomastoid injection amount:   10 unit(s)        R sternocleidomastoid injection amount:   5 unit(s)         L semispinalis capitis injection amount:   0 unit(s)      R semispinalis capitis injection amount:  2 5 unit(s)         L splenius cervicis injection amount:  0 unit(s)      R splenius cervicis injection amount:   2 5 unit(s)         L levator scapulae amount:  10 unit(s)      R Levator scapulae amount:  10unit(s)        Total units used:  200 units  Total units wasted:  0 units     Post-procedure details:   Patient tolerance of procedure:  Tolerated well, no immediate complications

## 2020-03-12 DIAGNOSIS — F41.1 GENERALIZED ANXIETY DISORDER: ICD-10-CM

## 2020-03-13 RX ORDER — LORAZEPAM 0.5 MG/1
0.5 TABLET ORAL DAILY PRN
Qty: 15 TABLET | Refills: 0 | Status: SHIPPED | OUTPATIENT
Start: 2020-03-13 | End: 2021-01-14 | Stop reason: SDUPTHER

## 2020-03-17 ENCOUNTER — TELEPHONE (OUTPATIENT)
Dept: GASTROENTEROLOGY | Facility: MEDICAL CENTER | Age: 45
End: 2020-03-17

## 2020-03-19 NOTE — H&P (VIEW-ONLY)
Colon and Rectal Surgery   Duane Oneill 38 y o  female MRN: [id number]   Encounter: [id number]  03/19/20   2:41 PM        ASSESSMENT:  Anal fistula/fissure, question intersphincteric abscess or high blind tract, left posterolateral  We discussed possible operative need but no current elective procedures being scheduled for pandemic reasons, she also agrees with waiting at this time    PLAN:  Bactrim 1 week given for fullness left posterolateral digital examination and egress of purulent material  3months followup office visit to recheck and discuss possible fistula surgery    HPI  Duane Oneill is a 38 y o  female here today for an evaluation to an abscess  She was last seen in our office on 3/16/2020 and saw Dr Jimena Villa for an anal fissure  She has been having some drainage  She has anal pain and states when it intensifies the more drainage that comes out  She reports the lump she feels inside her rectum is increasing in size but no longer draining  She still has increasing anal pain  She does not have pain with a bowel movement  An MRI in December 10, 2019 showed persistent left posterolateral findings  Historical Information   Past Medical History:   Diagnosis Date    Anal fissure     Migraine      Past Surgical History:   Procedure Laterality Date    COLONOSCOPY  07/2019    HEMORRHOID SURGERY      UPPER GASTROINTESTINAL ENDOSCOPY  07/2019    VARICOSE VEIN SURGERY         Meds/Allergies       Current Outpatient Medications:     amitriptyline (ELAVIL) 10 mg tablet, take 2 tablets by mouth at bedtime, Disp: 60 tablet, Rfl: 6    bisacodyl (DULCOLAX) 5 mg EC tablet, Take 5 mg by mouth daily , Disp: , Rfl:     calcium carbonate-vitamin D (OSCAL-D) 500 mg-200 units per tablet, Take 2 tablets by mouth daily, Disp: , Rfl:     dihydroergotamine (MIGRANAL) 4 MG/ML nasal spray, 1 spray into each nostril as needed Use in one nostril as directed    No more than 4 sprays in one hour, Disp: , Rfl:    Ergocalciferol 2000 units CAPS, Take 2,000 Units by mouth daily, Disp: 30 capsule, Rfl: 0    ibuprofen (MOTRIN) 200 mg tablet, Take by mouth every 6 (six) hours as needed for mild pain, Disp: , Rfl:     LORazepam (ATIVAN) 0 5 mg tablet, Take 1 tablet (0 5 mg total) by mouth daily as needed for anxiety, Disp: 15 tablet, Rfl: 0    Magnesium 100 MG CAPS, Take 1 capsule by mouth daily , Disp: , Rfl:     naproxen (NAPROSYN) 500 mg tablet, , Disp: , Rfl:     omeprazole (PriLOSEC) 20 mg delayed release capsule, Take 1 capsule (20 mg total) by mouth daily, Disp: 30 capsule, Rfl: 2    omeprazole (PriLOSEC) 40 MG capsule, Take 1 capsule (40 mg total) by mouth daily before breakfast, Disp: 30 capsule, Rfl: 3    Probiotic Product (PROBIOTIC-10 PO), Take 1 tablet by mouth daily , Disp: , Rfl:     sertraline (ZOLOFT) 25 mg tablet, Take 1 tablet (25 mg total) by mouth daily, Disp: 30 tablet, Rfl: 5    Triamcinolone Acetonide (NASACORT ALLERGY 24HR) 55 MCG/ACT AERO, into each nostril, Disp: , Rfl:     Turmeric, Curcuma Longa, (CURCUMIN) POWD, Take 1 tablet by mouth daily , Disp: , Rfl:     sulfamethoxazole-trimethoprim (BACTRIM DS) 800-160 mg per tablet, Take 1 tablet by mouth every 12 (twelve) hours for 7 days, Disp: 14 tablet, Rfl: 0      Allergies   Allergen Reactions    Other      Other reaction(s): rash    Pollen Extract Headache         Social History   Social History     Substance and Sexual Activity   Alcohol Use Yes    Comment: Socially      Social History     Substance and Sexual Activity   Drug Use No     Social History     Tobacco Use   Smoking Status Former Smoker   Smokeless Tobacco Never Used         Family History:   Family History   Problem Relation Age of Onset    Colon cancer Mother     Coronary artery disease Maternal Grandmother     Coronary artery disease Paternal Grandmother     Hypertension Family     Colon cancer Maternal Grandfather        Review of Systems   Constitutional: Negative  HENT: Negative  Eyes: Negative  Respiratory: Negative  Cardiovascular: Negative  Gastrointestinal: Positive for rectal pain  Endocrine: Negative  Genitourinary: Negative  Musculoskeletal: Negative  Skin: Negative  Allergic/Immunologic: Negative  Neurological: Negative  Hematological: Negative  Psychiatric/Behavioral: Negative  Objective     Current Vitals:   Vitals:    03/19/20 1329   Weight: 54 9 kg (121 lb)   Height: 5' (1 524 m)         Physical Exam:  General:no distress  Pulm:no increased work of breathing  Rectal:normal perianal skin without changes obvious of Crohn's, left posterolateral fibrotic skin area, on palpation this shows egress of small purulence into posterior midline, normal sphincter tone with mild fullness palpated  Extremities:no edema  Lymphatics:no neck/axillary/groin lymphadenopathy    Anoscopy  Date/Time: 3/20/2020 2:19 PM  Performed by: Janki Aguayo MD  Authorized by: Janki Aguayo MD     Verbal consent obtained?: Yes    Consent given by:  Patient  Indications comment:  Fistula  Scope type: Anoscope   Persistent post anal/posterior midline granulation tissue, otherwise normal anorectal mucosa

## 2020-03-20 ENCOUNTER — TELEPHONE (OUTPATIENT)
Dept: GASTROENTEROLOGY | Facility: AMBULARY SURGERY CENTER | Age: 45
End: 2020-03-20

## 2020-03-20 NOTE — TELEPHONE ENCOUNTER
Patients GI provider:  Dr Cleo Barreto    Number to return call: (  [phone]    Reason for call: Pt has appt 3-23-20 and wants to know if she can have virtual visit---please assist    Scheduled procedure/appointment date if applicable: Apt/procedure 3-23-20

## 2020-03-23 ENCOUNTER — TELEMEDICINE (OUTPATIENT)
Dept: GASTROENTEROLOGY | Facility: MEDICAL CENTER | Age: 45
End: 2020-03-23
Payer: COMMERCIAL

## 2020-03-23 DIAGNOSIS — T36.95XA ANTIBIOTIC-ASSOCIATED DIARRHEA: ICD-10-CM

## 2020-03-23 DIAGNOSIS — K21.9 GASTROESOPHAGEAL REFLUX DISEASE, ESOPHAGITIS PRESENCE NOT SPECIFIED: ICD-10-CM

## 2020-03-23 DIAGNOSIS — K52.1 ANTIBIOTIC-ASSOCIATED DIARRHEA: ICD-10-CM

## 2020-03-23 DIAGNOSIS — K60.3 FISTULA-IN-ANO: Primary | ICD-10-CM

## 2020-03-23 PROCEDURE — 99214 OFFICE O/P EST MOD 30 MIN: CPT | Performed by: INTERNAL MEDICINE

## 2020-03-26 ENCOUNTER — ANESTHESIA (OUTPATIENT)
Dept: PERIOP | Facility: HOSPITAL | Age: 45
End: 2020-03-26
Payer: COMMERCIAL

## 2020-03-26 ENCOUNTER — HOSPITAL ENCOUNTER (OUTPATIENT)
Facility: HOSPITAL | Age: 45
Setting detail: OUTPATIENT SURGERY
Discharge: HOME | End: 2020-03-26
Attending: COLON & RECTAL SURGERY | Admitting: COLON & RECTAL SURGERY
Payer: COMMERCIAL

## 2020-03-26 ENCOUNTER — ANESTHESIA EVENT (OUTPATIENT)
Dept: PERIOP | Facility: HOSPITAL | Age: 45
End: 2020-03-26
Payer: COMMERCIAL

## 2020-03-26 VITALS
DIASTOLIC BLOOD PRESSURE: 67 MMHG | BODY MASS INDEX: 23.56 KG/M2 | HEART RATE: 96 BPM | OXYGEN SATURATION: 99 % | RESPIRATION RATE: 20 BRPM | SYSTOLIC BLOOD PRESSURE: 116 MMHG | HEIGHT: 60 IN | WEIGHT: 120 LBS | TEMPERATURE: 100.2 F

## 2020-03-26 DIAGNOSIS — K62.89 ANAL PAIN: ICD-10-CM

## 2020-03-26 DIAGNOSIS — L98.8 FISTULA: Primary | ICD-10-CM

## 2020-03-26 LAB
EXT PREGNANCY TEST URINE: NEGATIVE
EXT. CONTROL: NORMAL

## 2020-03-26 PROCEDURE — 45990 SURG DX EXAM ANORECTAL: CPT | Performed by: COLON & RECTAL SURGERY

## 2020-03-26 PROCEDURE — 81025 URINE PREGNANCY TEST: CPT | Performed by: COLON & RECTAL SURGERY

## 2020-03-26 PROCEDURE — 76872 US TRANSRECTAL: CPT | Performed by: COLON & RECTAL SURGERY

## 2020-03-26 PROCEDURE — 88304 TISSUE EXAM BY PATHOLOGIST: CPT | Performed by: PATHOLOGY

## 2020-03-26 PROCEDURE — 46280 REMOVE ANAL FIST COMPLEX: CPT | Performed by: COLON & RECTAL SURGERY

## 2020-03-26 RX ORDER — LIDOCAINE HYDROCHLORIDE 10 MG/ML
0.5 INJECTION, SOLUTION EPIDURAL; INFILTRATION; INTRACAUDAL; PERINEURAL ONCE AS NEEDED
Status: COMPLETED | OUTPATIENT
Start: 2020-03-26 | End: 2020-03-26

## 2020-03-26 RX ORDER — ONDANSETRON 2 MG/ML
INJECTION INTRAMUSCULAR; INTRAVENOUS AS NEEDED
Status: DISCONTINUED | OUTPATIENT
Start: 2020-03-26 | End: 2020-03-26 | Stop reason: SURG

## 2020-03-26 RX ORDER — ACETAMINOPHEN 500 MG
1000 TABLET ORAL EVERY 6 HOURS PRN
COMMUNITY
End: 2021-04-29

## 2020-03-26 RX ORDER — PROPOFOL 10 MG/ML
INJECTION, EMULSION INTRAVENOUS AS NEEDED
Status: DISCONTINUED | OUTPATIENT
Start: 2020-03-26 | End: 2020-03-26 | Stop reason: SURG

## 2020-03-26 RX ORDER — DEXAMETHASONE SODIUM PHOSPHATE 10 MG/ML
INJECTION, SOLUTION INTRAMUSCULAR; INTRAVENOUS AS NEEDED
Status: DISCONTINUED | OUTPATIENT
Start: 2020-03-26 | End: 2020-03-26 | Stop reason: SURG

## 2020-03-26 RX ORDER — GINSENG 100 MG
CAPSULE ORAL AS NEEDED
Status: DISCONTINUED | OUTPATIENT
Start: 2020-03-26 | End: 2020-03-26 | Stop reason: HOSPADM

## 2020-03-26 RX ORDER — HYDROMORPHONE HCL/PF 1 MG/ML
0.2 SYRINGE (ML) INJECTION
Status: DISCONTINUED | OUTPATIENT
Start: 2020-03-26 | End: 2020-03-26 | Stop reason: HOSPADM

## 2020-03-26 RX ORDER — LIDOCAINE HYDROCHLORIDE 10 MG/ML
INJECTION, SOLUTION EPIDURAL; INFILTRATION; INTRACAUDAL; PERINEURAL AS NEEDED
Status: DISCONTINUED | OUTPATIENT
Start: 2020-03-26 | End: 2020-03-26 | Stop reason: SURG

## 2020-03-26 RX ORDER — SUCCINYLCHOLINE/SOD CL,ISO/PF 100 MG/5ML
SYRINGE (ML) INTRAVENOUS AS NEEDED
Status: DISCONTINUED | OUTPATIENT
Start: 2020-03-26 | End: 2020-03-26 | Stop reason: SURG

## 2020-03-26 RX ORDER — ONDANSETRON 2 MG/ML
4 INJECTION INTRAMUSCULAR; INTRAVENOUS ONCE AS NEEDED
Status: DISCONTINUED | OUTPATIENT
Start: 2020-03-26 | End: 2020-03-26 | Stop reason: HOSPADM

## 2020-03-26 RX ORDER — HYDROCODONE BITARTRATE AND ACETAMINOPHEN 5; 325 MG/1; MG/1
1 TABLET ORAL EVERY 6 HOURS PRN
Qty: 10 TABLET | Refills: 0 | Status: SHIPPED | OUTPATIENT
Start: 2020-03-26 | End: 2020-04-05

## 2020-03-26 RX ORDER — MAGNESIUM HYDROXIDE 1200 MG/15ML
LIQUID ORAL AS NEEDED
Status: DISCONTINUED | OUTPATIENT
Start: 2020-03-26 | End: 2020-03-26 | Stop reason: HOSPADM

## 2020-03-26 RX ORDER — HYDROCODONE BITARTRATE AND ACETAMINOPHEN 5; 325 MG/1; MG/1
1 TABLET ORAL EVERY 6 HOURS PRN
Status: DISCONTINUED | OUTPATIENT
Start: 2020-03-26 | End: 2020-03-26 | Stop reason: HOSPADM

## 2020-03-26 RX ORDER — METOCLOPRAMIDE HYDROCHLORIDE 5 MG/ML
10 INJECTION INTRAMUSCULAR; INTRAVENOUS ONCE AS NEEDED
Status: DISCONTINUED | OUTPATIENT
Start: 2020-03-26 | End: 2020-03-26 | Stop reason: HOSPADM

## 2020-03-26 RX ORDER — FENTANYL CITRATE/PF 50 MCG/ML
25 SYRINGE (ML) INJECTION
Status: COMPLETED | OUTPATIENT
Start: 2020-03-26 | End: 2020-03-26

## 2020-03-26 RX ORDER — MIDAZOLAM HYDROCHLORIDE 2 MG/2ML
INJECTION, SOLUTION INTRAMUSCULAR; INTRAVENOUS AS NEEDED
Status: DISCONTINUED | OUTPATIENT
Start: 2020-03-26 | End: 2020-03-26 | Stop reason: SURG

## 2020-03-26 RX ORDER — FENTANYL CITRATE 50 UG/ML
INJECTION, SOLUTION INTRAMUSCULAR; INTRAVENOUS AS NEEDED
Status: DISCONTINUED | OUTPATIENT
Start: 2020-03-26 | End: 2020-03-26 | Stop reason: SURG

## 2020-03-26 RX ORDER — SODIUM CHLORIDE, SODIUM LACTATE, POTASSIUM CHLORIDE, CALCIUM CHLORIDE 600; 310; 30; 20 MG/100ML; MG/100ML; MG/100ML; MG/100ML
125 INJECTION, SOLUTION INTRAVENOUS CONTINUOUS
Status: DISCONTINUED | OUTPATIENT
Start: 2020-03-26 | End: 2020-03-26 | Stop reason: HOSPADM

## 2020-03-26 RX ORDER — LIDOCAINE HYDROCHLORIDE 10 MG/ML
INJECTION, SOLUTION EPIDURAL; INFILTRATION; INTRACAUDAL; PERINEURAL AS NEEDED
Status: DISCONTINUED | OUTPATIENT
Start: 2020-03-26 | End: 2020-03-26 | Stop reason: HOSPADM

## 2020-03-26 RX ORDER — BUPIVACAINE HYDROCHLORIDE 2.5 MG/ML
INJECTION, SOLUTION EPIDURAL; INFILTRATION; INTRACAUDAL AS NEEDED
Status: DISCONTINUED | OUTPATIENT
Start: 2020-03-26 | End: 2020-03-26 | Stop reason: HOSPADM

## 2020-03-26 RX ADMIN — FENTANYL CITRATE 25 MCG: 50 INJECTION INTRAMUSCULAR; INTRAVENOUS at 13:52

## 2020-03-26 RX ADMIN — FENTANYL CITRATE 100 MCG: 50 INJECTION, SOLUTION INTRAMUSCULAR; INTRAVENOUS at 12:46

## 2020-03-26 RX ADMIN — HYDROCODONE BITARTRATE AND ACETAMINOPHEN 1 TABLET: 5; 325 TABLET ORAL at 14:55

## 2020-03-26 RX ADMIN — SODIUM CHLORIDE, SODIUM LACTATE, POTASSIUM CHLORIDE, AND CALCIUM CHLORIDE 125 ML/HR: .6; .31; .03; .02 INJECTION, SOLUTION INTRAVENOUS at 11:30

## 2020-03-26 RX ADMIN — MIDAZOLAM 2 MG: 1 INJECTION INTRAMUSCULAR; INTRAVENOUS at 12:44

## 2020-03-26 RX ADMIN — DEXAMETHASONE SODIUM PHOSPHATE 10 MG: 10 INJECTION, SOLUTION INTRAMUSCULAR; INTRAVENOUS at 13:01

## 2020-03-26 RX ADMIN — Medication 80 MG: at 12:52

## 2020-03-26 RX ADMIN — FENTANYL CITRATE 25 MCG: 50 INJECTION INTRAMUSCULAR; INTRAVENOUS at 14:15

## 2020-03-26 RX ADMIN — ONDANSETRON 4 MG: 2 INJECTION INTRAMUSCULAR; INTRAVENOUS at 13:01

## 2020-03-26 RX ADMIN — LIDOCAINE HYDROCHLORIDE 0.1 ML: 10 INJECTION, SOLUTION EPIDURAL; INFILTRATION; INTRACAUDAL; PERINEURAL at 11:30

## 2020-03-26 RX ADMIN — LIDOCAINE HYDROCHLORIDE 50 MG: 10 INJECTION, SOLUTION EPIDURAL; INFILTRATION; INTRACAUDAL; PERINEURAL at 12:52

## 2020-03-26 RX ADMIN — PROPOFOL 200 MG: 10 INJECTION, EMULSION INTRAVENOUS at 12:52

## 2020-03-26 RX ADMIN — FENTANYL CITRATE 25 MCG: 50 INJECTION INTRAMUSCULAR; INTRAVENOUS at 13:47

## 2020-03-26 RX ADMIN — FENTANYL CITRATE 25 MCG: 50 INJECTION INTRAMUSCULAR; INTRAVENOUS at 14:00

## 2020-03-26 NOTE — ANESTHESIA PREPROCEDURE EVALUATION
Review of Systems/Medical History  Patient summary reviewed    No history of anesthetic complications     Cardiovascular  Exercise tolerance (METS): >4,  No angina ,    Pulmonary  Not a smoker , No shortness of breath, No recent URI ,        GI/Hepatic    No GERD ,        Negative  ROS        Endo/Other  Negative endo/other ROS      GYN       Hematology   Musculoskeletal  Negative musculoskeletal ROS        Neurology  No seizures ,  No CVA , Headaches,    Psychology   Anxiety,              Physical Exam    Airway    Mallampati score: II  TM Distance: >3 FB  Neck ROM: full     Dental   No notable dental hx     Cardiovascular      Pulmonary      Other Findings        Anesthesia Plan  ASA Score- 2     Anesthesia Type- general with ASA Monitors  Additional Monitors:   Airway Plan: ETT  Plan Factors-    Induction- intravenous and rapid sequence induction  Postoperative Plan-     Informed Consent- Anesthetic plan and risks discussed with patient  I personally reviewed this patient with the CRNA  Discussed and agreed on the Anesthesia Plan with the TENZIN Walker

## 2020-03-26 NOTE — INTERVAL H&P NOTE
H&P reviewed  After examining the patient I find no changes in the patients condition since the H&P had been written  She is having ongoing pain, now with low grade temperature discussed examination under anesthesia, endo anal ultrasound, possible fistulotomy, possible seton placement possible abscess drainage,anorectal surgery and in a face to face, personal informed consent the alternatives,benefits risks including not limited to bleeding, infection, risks of anesthesia, damage to sphincter/incontinence, possibility of seton placement and staged surgery  She understood these risks, signed informed consent, and wish to proceed      Vitals:    03/26/20 1106   BP: 114/72   Pulse: (!) 108   Resp: 20   Temp: 99 1 °F (37 3 °C)   SpO2: 98%

## 2020-03-26 NOTE — DISCHARGE INSTRUCTIONS
May shower/tub bathe this evening  There are beige sutures left in place over small open wound to drain the area and heal from below, okay to pop or bleed  There will be some bleeding/drainage, normal , may use dry gauze  Followup in office 6-8weeks Dr Mani Rivera as prescribed for pain not treated by ibuprofen, do not combine with tylenol    High fiber diet with metamucil or konsyl 1 tbsp 1-2x/day, increased hydration noncaffeinated beverages  May use Miralax as needed if no bowel movements in next 24-48hours  Call if any concerns [phone]

## 2020-03-26 NOTE — OP NOTE
OPERATIVE REPORT  PATIENT NAME: Everett Escamilla    :  1975  MRN: [id number]  Pt Location: [facility] OR ROOM 06    SURGERY DATE: 3/26/2020    Surgeon(s) and Role:     * Gamal Osorio MD - Primary    Preop Diagnosis:  Fistula [L98 8]  Anal pain [K62 89]    Post-Op Diagnosis Codes:     * Fistula [L98 8]     * Anal pain [K62 89]    Procedure(s) (LRB):  EXAM UNDER ANESTHESIA  Anoscopy  ENDOANAL ULTRASOUND  FISTULOTOMY     Specimen(s):  ID Type Source Tests Collected by Time Destination   1 :  Tissue Fistula TISSUE EXAM Gamal Osorio MD 3/26/2020 1325        Estimated Blood Loss:   Minimal    Drains:  * No LDAs found *    Anesthesia Type:   General    Operative Indications:  Fistula [L98 8]  Anal pain [K62 89]    Operative Findings:  -left posterior lateral intersphincteric fullness on endo anal ultrasound, fistula probe placed in posterior midline previous granulation bed and this communicates with this same location,fistula/intersphincteric abscess cavity related to prior surgery  -primary fistulotomy performed in the left posterior lateral position, marsupialized standard fashion    Complications:   None    Procedure and Technique:  46-year-old female previous surgical interventions with persistent left posterior lateral fullness, anal pain, today with low-grade temperature despite antibiotic course, booked for examination under anesthesia  We discussed possible abscess, possible fistula in ano and anorectal surgery plus risks including not limited to bleeding, infection, risks of anesthesia, damage to sphincter/incontinence, possibility of seton placement/staged surgery  She understood these risks, signed informed consent, wished to proceed, was brought to the operating room  General anesthesia was induced without event  Patient was placed in the prone jackknife position with attention to all extremities, bony prominences, and genitalia  Venodynes were on and running throughout the case   No antibiotics were given as none medically indicated  Buttocks were taped apart  After time-out was taken per protocol procedure began with digital rectal examination, left posterior lateral fullness palpated as at office, endo anal ultrasound probe placed per anus, oriented to puborectalis and withdrawn with serial imaging showing left posterior lateral fullness possible fluid collection  Betadine prep and sterile drapes were placed  After timeout was re-taken per protocol procedure began  Digital rectal examination followed by anoscopy showing posterior midline fissure/granulation from previous surgery, candy cane fistula probe placed and easily dropped into the left posterior lateral position over the area of skin fibrosis palpated, this was incised/cord open to communicate with the area  On evaluation there was small amount of internal sphincter, and minimal superficial slips of external sphincter muscle with no bulk  I elected to perform fistulotomy and this was done along the probe with electrocautery and needlepoint  The wound was curetted, specimen passed, and then marsupialized open with 3-0 chromic suture for hemostasis  There were 2 interrupted 3-0 chromic sutures placed on the proximal anoderm to help prevent keyhole  There was no anal stenosis and hemostasis was assured on repeat digital exam      Bacitracin, 4 x 4's and mesh underwear were placed  All sponge needle and instrument counts were correct I was present and scrubbed for the entire procedure  Patient was awakened/extubated, rolled supine and brought to the recovery room in stable condition       I was present for the entire procedure    Patient Disposition:  PACU     SIGNATURE: Mellisa Moore MD  DATE: March 26, 2020  TIME: 1:40 PM

## 2020-03-31 ENCOUNTER — TELEPHONE (OUTPATIENT)
Dept: NEUROLOGY | Facility: CLINIC | Age: 45
End: 2020-03-31

## 2020-03-31 NOTE — TELEPHONE ENCOUNTER
Type Date User Summary Attachment   General 03/31/2020  3:21 PM Britney Beltran care coordination  -   Note    Botox- authorization #: [id number]- last visit- valid from 5/20/2019 until 5/19/2020   Please use [facility]      Thank you,     Milvia Chicas

## 2020-04-01 NOTE — TELEPHONE ENCOUNTER
75 Rob Steel- spoke with Bekah Lockhart- I advised her that I was calling to initiate a refill for the patient's Botox prescription  Refill request initiated  Will do a status check in 2 days

## 2020-04-07 NOTE — TELEPHONE ENCOUNTER
Botox arrived today 4/7/2020    Botox number of units 200  Botox quantity: 1  Arrived at what location: [facility]  Lot number: [id number]    Logged botox and stored in fridge

## 2020-05-06 ENCOUNTER — PROCEDURE VISIT (OUTPATIENT)
Dept: NEUROLOGY | Facility: CLINIC | Age: 45
End: 2020-05-06
Payer: COMMERCIAL

## 2020-05-06 ENCOUNTER — TELEPHONE (OUTPATIENT)
Dept: NEUROLOGY | Facility: CLINIC | Age: 45
End: 2020-05-06

## 2020-05-06 VITALS — TEMPERATURE: 97.4 F | DIASTOLIC BLOOD PRESSURE: 72 MMHG | SYSTOLIC BLOOD PRESSURE: 109 MMHG | HEART RATE: 95 BPM

## 2020-05-06 DIAGNOSIS — G24.3 CERVICAL DYSTONIA: Primary | ICD-10-CM

## 2020-05-06 PROCEDURE — 64616 CHEMODENERV MUSC NECK DYSTON: CPT | Performed by: PSYCHIATRY & NEUROLOGY

## 2020-05-09 DIAGNOSIS — K21.9 GASTROESOPHAGEAL REFLUX DISEASE, ESOPHAGITIS PRESENCE NOT SPECIFIED: ICD-10-CM

## 2020-05-09 RX ORDER — OMEPRAZOLE 20 MG/1
CAPSULE, DELAYED RELEASE ORAL
Qty: 30 CAPSULE | Refills: 2 | Status: SHIPPED | OUTPATIENT
Start: 2020-05-09 | End: 2020-12-21

## 2020-05-20 ENCOUNTER — TELEMEDICINE (OUTPATIENT)
Dept: GASTROENTEROLOGY | Facility: MEDICAL CENTER | Age: 45
End: 2020-05-20
Payer: COMMERCIAL

## 2020-05-20 DIAGNOSIS — K60.2 FISSURE, ANAL: Primary | ICD-10-CM

## 2020-05-20 DIAGNOSIS — K22.70 BARRETT'S ESOPHAGUS WITHOUT DYSPLASIA: ICD-10-CM

## 2020-05-20 DIAGNOSIS — K21.9 GASTROESOPHAGEAL REFLUX DISEASE, ESOPHAGITIS PRESENCE NOT SPECIFIED: ICD-10-CM

## 2020-05-20 DIAGNOSIS — K31.A0 INTESTINAL METAPLASIA OF GASTRIC MUCOSA: ICD-10-CM

## 2020-05-20 PROCEDURE — 99214 OFFICE O/P EST MOD 30 MIN: CPT | Performed by: INTERNAL MEDICINE

## 2020-06-29 ENCOUNTER — TELEPHONE (OUTPATIENT)
Dept: GASTROENTEROLOGY | Facility: MEDICAL CENTER | Age: 45
End: 2020-06-29

## 2020-07-02 ENCOUNTER — TELEPHONE (OUTPATIENT)
Dept: NEUROLOGY | Facility: CLINIC | Age: 45
End: 2020-07-02

## 2020-07-02 ENCOUNTER — TELEPHONE (OUTPATIENT)
Dept: GASTROENTEROLOGY | Facility: MEDICAL CENTER | Age: 45
End: 2020-07-02

## 2020-07-02 NOTE — TELEPHONE ENCOUNTER
Type Date User Summary Attachment   General 07/02/2020  8:35 AM Keyla Marte care coordination  -   Note    Botox- authorization #: [id number]- valid for 4 visits- from 7/1/2020 until 6/30/2021   Please use [facility]      Thank you,     Mckenzie Reynolds

## 2020-07-02 NOTE — TELEPHONE ENCOUNTER
75 Rob Steel- spoke with Paul Block- I advised her I was calling to initiate a refill on the patient's Botox prescription  She states the current prescription on file is  and a new verbal Rx would need to be called in  She was able to transfer me to a pharmacist to provide a verbal  Spoke with Jean Carlos Hough, pharmacist- provided a verbal Rx for Botox 200 units QTY: 1 under Ld Stacy for Cervical Dystonia with 3 refills  Will do a status check in 2 days

## 2020-07-02 NOTE — TELEPHONE ENCOUNTER
Called [facility]- spoke with Janna Merry I advised her that I needed to get [facility] added onto the patient's authorization  I advised her I just received the approval and [facility] was not listed as the specialty pharmacy  She states she needs to transfer me to the nurses so that they can assist with this  Spoke with Trish Sheth in medical mangement- I advised him that I needed to get [facility] added onto the patient's authorization that was just approved  He states he was able to add on [facility] to the authorization and everything is all set to go      Authorization information:    Authorization #: [id number]  Valid dates: 7/1/2020 until 6/30/2021- valid for 4 visits  Please use [facility]

## 2020-07-02 NOTE — LETTER
July 2, 2020    Reginaldo Arreola  [address] [place] [id number]      Dear Ms Mitchell: We have been unable to reach you to schedule your procvedure with Dr Adam Wagner  Please call the [place] off ice to schedule  If you have any questions or concerns, please don't hesitate to call      Sincerely,             [facility]      CC: No Recipients

## 2020-07-02 NOTE — TELEPHONE ENCOUNTER
----- Message from Mesha Coreas MD sent at 6/29/2020  7:28 AM EDT -----  Regarding: FW: Non-Urgent Medical Question  Contact: [phone]  Hi,    Can we set her up for an EGD at [address] with me? Thanks,  Janett Chatterjee  ----- Message -----  From: Izzy Almanzar MA  Sent: 6/26/2020   9:13 AM EDT  To: Mesha Coreas MD  Subject: FW: Non-Urgent Medical Question                      ----- Message -----  From: Christina Calvert  Sent: 6/26/2020   8:10 AM EDT  To: , #  Subject: Non-Urgent Medical Question                      HI Dr Fern Fabian all is well  I am reaching out to start the discussion about when I should schedule my follow up Upper GI endoscopy and provide an update on my health  Thankfully, I have continued healing from my surgery  I have minimal pain and sporadic bleeding, which Dr Madhav Nina said was normal  Over the past several weeks, I have been trying to put the pieces together regarding some ongoing symptoms that I have shrugged off in the past  I have continued to experience spells of joint paint - it is mostly concentrated in my ankles and wrists and feel like overuse (but I haven't) but at night they throb and ache  The aches typically come after I have had a bout of diarrhea  Looking back over the past couple of years, I would say this is happening every 2 - 3 months  I have no fever  During this my stomach and bowels are usually not right - even though my diet and hydration remains consistent  I'll have pains and cramping, sometimes associated with diarrhea and sometimes not  and then I'll be bloated and have an increase in gas  I know we have talked about these symptoms in the past, but I am in the middle of a bout of joint pain right now and I just felt compelled to let you know  I'm not sure if there is something else we should look at to determine what is causing this pattern  Do you think this is related to my GI issues? If not, is there a different doctor that I should see? Also, I have switched back to the 40 mg of omeprazole  The 20 mg was just not cutting it and I was getting acid reflux just about every day that I was treating with Tums  Let me know how we should proceed? Do you want me to get the Upper GI scheduled and then go from there? Is there any other testing that we should consider? Would these symptoms be indicative of IBS?    Thanks so much,   Joslyn Ray

## 2020-07-06 DIAGNOSIS — K31.A0 INTESTINAL METAPLASIA OF GASTRIC MUCOSA: ICD-10-CM

## 2020-07-06 RX ORDER — OMEPRAZOLE 40 MG/1
CAPSULE, DELAYED RELEASE ORAL
Qty: 30 CAPSULE | Refills: 3 | Status: SHIPPED | OUTPATIENT
Start: 2020-07-06 | End: 2020-11-02 | Stop reason: SDUPTHER

## 2020-07-07 ENCOUNTER — PREP FOR PROCEDURE (OUTPATIENT)
Dept: GASTROENTEROLOGY | Facility: MEDICAL CENTER | Age: 45
End: 2020-07-07

## 2020-07-07 DIAGNOSIS — K22.70 BARRETT'S ESOPHAGUS WITHOUT DYSPLASIA: Primary | ICD-10-CM

## 2020-07-07 DIAGNOSIS — M25.50 ARTHRALGIA, UNSPECIFIED JOINT: Primary | ICD-10-CM

## 2020-07-07 DIAGNOSIS — Z20.822 ENCOUNTER FOR LABORATORY TESTING FOR COVID-19 VIRUS: ICD-10-CM

## 2020-07-07 NOTE — TELEPHONE ENCOUNTER
The patient is scheduled at [facility] for an EGD with Dr Ashford on 08/18/2020  prep instructions have been gone over in the office, with the patient, by the MA  The patient is aware that they will receive a call with the arrival time the day prior to procedure and that they will need a  the day of the procedure   I have asked the patient to call with any questions that they might have prior to procedure  She is aware to have covid testing 08/12-08/13

## 2020-07-07 NOTE — TELEPHONE ENCOUNTER
Called [facility]- spoke with MARISOL HUTCHISON- she states the patient's Botox order is ready to be scheduled for delivery  Patient's consent is not on file  Will contact the patient today to make her aware she needs to call and provide consent to ship

## 2020-07-08 NOTE — TELEPHONE ENCOUNTER
75 Rob Steel- spoke with [facility]- she states the patient's Botox order is ready to be scheduled for delivery  Patient's consent is on file  Botox to be delivered on Thursday 7/9/2020 to the [facility] location via [facility] overnight- a signature will be required  Vianey Gutiérrez,    Please await the patient's Botox order and document once it has arrived  Please let me know if you do not receive the patient's Botox order      Thank you,    Yariel Minus

## 2020-07-16 DIAGNOSIS — G43.009 MIGRAINE WITHOUT AURA AND WITHOUT STATUS MIGRAINOSUS, NOT INTRACTABLE: ICD-10-CM

## 2020-07-17 RX ORDER — AMITRIPTYLINE HYDROCHLORIDE 10 MG/1
TABLET, FILM COATED ORAL
Qty: 60 TABLET | Refills: 2 | Status: SHIPPED | OUTPATIENT
Start: 2020-07-17 | End: 2020-10-19 | Stop reason: SDUPTHER

## 2020-07-17 NOTE — TELEPHONE ENCOUNTER
Spoke with patient states she is still taking medication- she states that without the medication she was getting migraines  Both the botox and amitriptyline are helping her a lot

## 2020-08-11 ENCOUNTER — PROCEDURE VISIT (OUTPATIENT)
Dept: NEUROLOGY | Facility: CLINIC | Age: 45
End: 2020-08-11
Payer: COMMERCIAL

## 2020-08-11 VITALS — TEMPERATURE: 97.7 F | HEART RATE: 90 BPM | DIASTOLIC BLOOD PRESSURE: 70 MMHG | SYSTOLIC BLOOD PRESSURE: 119 MMHG

## 2020-08-11 DIAGNOSIS — G24.3 CERVICAL DYSTONIA: Primary | ICD-10-CM

## 2020-08-11 PROCEDURE — 64616 CHEMODENERV MUSC NECK DYSTON: CPT | Performed by: PSYCHIATRY & NEUROLOGY

## 2020-08-11 NOTE — PROGRESS NOTES
Chemodenervation    Date/Time: 8/11/2020 11:14 AM  Performed by: Nydia Perez MD  Authorized by: Nydia Perez MD     Pre-procedure details:     Preparation: Patient was prepped and draped in usual sterile fashion      Prepped With: Alcohol    Anesthesia (see MAR for exact dosages): Anesthesia method:  None  Procedure details:     Position:  Supine  Botox:     Botox Type:  Type A    Brand:  Botox    mL's of Botulinum Toxin:  200    Needle Gauge:  30 G 2 5 inch  Total Units:     Total units used:  200    Total units discarded:  0  Post-procedure details:     Chemodenervation:  Neck, excluding muscles of the larynx    Neck Muscle Location[de-identified]  Bilateral neck muscle    Patient tolerance of procedure:   Tolerated well, no immediate complications         Botox Procedures: cervical dystonia    Indications: spasmodic torticollis       Injection Location:     Head / Face:  R superior cervical paraspinal, L , L frontalis, R frontalis, R , L inferior occipitalis, R inferior occipitalis, L temporalis, R temporalis, L superior trapezius, R superior trapezius, procerus and L superior cervical paraspinal       L  injection amount:  2 5 unit(s)    R  injection amount: 2  5 unit(s)    Procerus injection amount: 2 5 unit(s)        Left orbicularis oculi - 2 5 units - not done today - but need to be done as pt has head pain there as well    Right orbicularis oculi - 2 5 units - not done today - but need to be done as pt has head pain there as well       L lateral frontalis:  2 5 unit(s)more spread out     R lateral frontalis:  2 5 unit(s)    L medial frontalis:  2 5 unit(s)    R medial frontalis: 2 5 unit(s)       L temporalis injection amount:  20 unit(s)    R temporalis injection amount:  20 unit(s)        L inferior occipitalis injection amount:  15 unit(s)    R inferior occipitalis injection amount:  15 unit(s)       L superior cervical paraspinal injection amount:  10 unit(s)    R superior cervical paraspinal injection amount:  10 unit(s)       L superior trapezius injection amount:  15 unit(s)    R superior trapezius injection amount:  15 unit(s)       bilateral parietal region injection amount:  10 each side      L sternocleidomastoid, L splenius cervicis, L semispinalis capitis and L horizontal trapezius        L sternocleidomastoid injection amount:   10 unit(s)        R sternocleidomastoid injection amount:   5 unit(s)         L semispinalis capitis injection amount:   0 unit(s)      R semispinalis capitis injection amount:  2 5 unit(s)         L splenius cervicis injection amount:  0 unit(s)      R splenius cervicis injection amount:   2 5 unit(s)         L levator scapulae amount:  10 unit(s)      R Levator scapulae amount:  10unit(s)        Total units used:  200 units  Total units wasted:  0 units     Post-procedure details:   Patient tolerance of procedure:  Tolerated well, no immediate complications

## 2020-08-12 ENCOUNTER — TELEPHONE (OUTPATIENT)
Dept: GASTROENTEROLOGY | Facility: AMBULARY SURGERY CENTER | Age: 45
End: 2020-08-12

## 2020-08-12 NOTE — TELEPHONE ENCOUNTER
Patients GI provider:  Dr George Hamilton    Number to return call: [phone]    Reason for call: Pt calling requesting to reschedule her procedure       Scheduled procedure/appointment date if applicable: Apt/procedure 8/18/20

## 2020-09-16 ENCOUNTER — TELEPHONE (OUTPATIENT)
Dept: GASTROENTEROLOGY | Facility: AMBULARY SURGERY CENTER | Age: 45
End: 2020-09-16

## 2020-09-16 NOTE — TELEPHONE ENCOUNTER
Patients GI provider:  Dr Tracy Hensley    Number to return call: (  [phone]    Reason for call: Pt calling to rs her  egd due to possible covid exposure    Scheduled procedure/appointment date if applicable: Apt/procedure   9-21-20

## 2020-09-17 ENCOUNTER — PREP FOR PROCEDURE (OUTPATIENT)
Dept: GASTROENTEROLOGY | Facility: MEDICAL CENTER | Age: 45
End: 2020-09-17

## 2020-09-17 DIAGNOSIS — K21.9 GASTROESOPHAGEAL REFLUX DISEASE, ESOPHAGITIS PRESENCE NOT SPECIFIED: ICD-10-CM

## 2020-09-17 DIAGNOSIS — K22.70 BARRETT'S ESOPHAGUS WITHOUT DYSPLASIA: Primary | ICD-10-CM

## 2020-09-21 DIAGNOSIS — Z20.822 ENCOUNTER FOR LABORATORY TESTING FOR COVID-19 VIRUS: ICD-10-CM

## 2020-09-21 PROCEDURE — U0003 INFECTIOUS AGENT DETECTION BY NUCLEIC ACID (DNA OR RNA); SEVERE ACUTE RESPIRATORY SYNDROME CORONAVIRUS 2 (SARS-COV-2) (CORONAVIRUS DISEASE [COVID-19]), AMPLIFIED PROBE TECHNIQUE, MAKING USE OF HIGH THROUGHPUT TECHNOLOGIES AS DESCRIBED BY CMS-2020-01-R: HCPCS

## 2020-09-22 LAB — SARS-COV-2 RNA SPEC QL NAA+PROBE: NOT DETECTED

## 2020-09-25 ENCOUNTER — TELEPHONE (OUTPATIENT)
Dept: NEUROLOGY | Facility: CLINIC | Age: 45
End: 2020-09-25

## 2020-09-25 NOTE — TELEPHONE ENCOUNTER
Type  Date  User  Summary  Attachment    General  09/25/2020 10:33 AM  Becka Arvizu  care coordination  -    Note     Botox- authorization #: [id number]- 2nd visit- valid from 7/1/2020 until 6/30/2021   Please use [facility]      Thank you,     Jasper Young

## 2020-09-25 NOTE — TELEPHONE ENCOUNTER
Patient is scheduled with Dr Jenniffer Alston on 11/11/2020 in the [facility]

## 2020-10-05 NOTE — TELEPHONE ENCOUNTER
[address] [place] [address]- spoke loida Mar- she states the patient's Botox order is in insurance verification  Order marked as STAT  Will do a status check in 2 days

## 2020-10-07 NOTE — TELEPHONE ENCOUNTER
75 Rob Steel- spoke with Ceci Roldan- he states the patient's Botox order is ready to be scheduled for delivery  Patient's consent is not on file  Will contact the patient today to make her aware that she needs to call and take care of this so we can coordinate delivery

## 2020-10-07 NOTE — TELEPHONE ENCOUNTER
Called and spoke with patient- I advised her that her Botox order is ready to be scheduled for delivery  Patient is aware she needs to call and provide consent to ship so we can coordinate delivery for her order  I provided her with the phone number for the specialty pharmacy- patient states she will can take care of this today by 1pm  Will call later today to coordinate delivery

## 2020-10-07 NOTE — TELEPHONE ENCOUNTER
[address] spoke with Gilda Farrar she states the patient's Botox is ready to be scheduled for delivery  Patient has not yet called in to provide consent to ship  Will do a status check tomorrow

## 2020-10-08 NOTE — TELEPHONE ENCOUNTER
75 Rob Steel- spoke with Heide Contreras- she states the patient's Botox order is ready to be scheduled for delivery  Patient's consent is not on file  I attempted to contact the patient the patient while I had [facility] on the phone- the patient did answer  She advised she did provide consent to ship yesterday  I advised her it was not on file  Patient provided consent to ship  Botox to be delivered on Friday 10/9/2020 to the [facility] location via [facility] overnight- a signature will be required  Toan Rapp,    Please await the patient's Botox order and document once it has arrived  Please let me know if you do not receive the patient's Botox order      Thank you,    Henry Espinoza

## 2020-10-08 NOTE — TELEPHONE ENCOUNTER
Erica Hough,    Please await delivery for tomorrow as I am out of office      Thank you,    Jyothi Sarah Post-Op Instructions: The patient was instructed in the proper use of post-operative eye drops: Ofloxacin in the surgical eye 4 times per day for 1 week, then discontinue . Continue Prolensa(or approved generic alternative Bromfenac 0.09%) once daily in the surgical eye and  Prednisolone 4 times per day until instructed further on next visit. Call back instructions, retinal detachment and endophthalmitis precautions given.

## 2020-10-09 ENCOUNTER — OFFICE VISIT (OUTPATIENT)
Dept: RHEUMATOLOGY | Facility: CLINIC | Age: 45
End: 2020-10-09
Payer: COMMERCIAL

## 2020-10-09 VITALS
BODY MASS INDEX: 23.91 KG/M2 | HEART RATE: 90 BPM | HEIGHT: 60 IN | DIASTOLIC BLOOD PRESSURE: 77 MMHG | SYSTOLIC BLOOD PRESSURE: 123 MMHG | WEIGHT: 121.8 LBS | TEMPERATURE: 98.8 F

## 2020-10-09 DIAGNOSIS — M79.10 MYALGIA: ICD-10-CM

## 2020-10-09 DIAGNOSIS — M25.50 ARTHRALGIA, UNSPECIFIED JOINT: Primary | ICD-10-CM

## 2020-10-09 PROCEDURE — 99204 OFFICE O/P NEW MOD 45 MIN: CPT | Performed by: INTERNAL MEDICINE

## 2020-10-09 RX ORDER — MELOXICAM 7.5 MG/1
7.5 TABLET ORAL DAILY
Qty: 30 TABLET | Refills: 5 | Status: SHIPPED | OUTPATIENT
Start: 2020-10-09 | End: 2020-12-28

## 2020-10-09 NOTE — PROGRESS NOTES
Assessment and Plan: Halina Alexander is a 38 y o   female who presents as a Rheumatology consult referred by Hardy Rutledge DO for evaluation of possible inflammatory arthritis  Has been having GI issues for some time, inconclusive for Crohn's disease, and gets flares of fatigue, myalgia, and arthralgia  Gets warm wrists and swollen ankles, and morning stiffness in ankles  Extensive autoimmune disease and inflammatory arthritis work-up below ordered, which ultimately returned unremarkable  Asked patient if she can ask her GI doctor about taking meloxicam for her arthritis symptoms  Will reassess how she is doing in five months  Plan:  Diagnoses and all orders for this visit:    Arthralgia, unspecified joint  -     Ambulatory referral to Rheumatology  -     CBC and differential  -     Comprehensive metabolic panel  -     C-reactive protein  -     Sedimentation rate, automated  -     HLA-B27 antigen  -     meloxicam (MOBIC) 7 5 mg tablet; Take 1 tablet (7 5 mg total) by mouth daily  -     Cyclic citrul peptide antibody, IgG  -     RF Screen w/ Reflex to Titer  -     ABISAI Screen w/ Reflex to Titer/Pattern  -     Sjogren's Antibodies    Myalgia    Follow-up plan: Return to clinic in 5 months      HPI  Halina Alexander is a 38 y o   female who presents as a Rheumatology consult referred by Hardy Rutledge DO for evaluation of possible inflammatory arthritis  Patient admits to severe chronic migraines  Get dry eyes, not dry mouth  Takes 2,000 units of Vit  D daily  Patient admits that her GI issues started several years ago with stomach virus like symptoms, had some focal ulceration in colon  In 2/2019, had a  hemmorhoidectomy, had infection then fistula, and inconclusive testing for Crohn's; every 2 5 months for about 2 weeks, gets flu-like episodes with fatigue, myalgia, and arthralgia  Gets warm wrists and swollen ankles  Has morning stiffness for half hour in ankles  Joint pain improves with activity   Last flare was 9/21-10/1st  Has not tried celecoxib or SSZ  Review of Systems  Review of Systems   Constitutional: Positive for fatigue  Negative for chills, fever and unexpected weight change  HENT: Negative for mouth sores and trouble swallowing  Eyes: Negative for pain and visual disturbance  Dry eyes   Respiratory: Negative for cough and shortness of breath  Cardiovascular: Negative for chest pain and leg swelling  Gastrointestinal: Positive for abdominal pain and constipation  Negative for blood in stool, diarrhea and nausea  Indigestion/heartburn   Musculoskeletal: Positive for arthralgias, myalgias and neck pain  Negative for back pain and joint swelling  Skin: Negative for color change and rash  Allergic/Immunologic: Positive for environmental allergies  Neurological: Positive for headaches  Negative for weakness and numbness  Hematological: Negative for adenopathy  Psychiatric/Behavioral: Negative for sleep disturbance         Allergies  Allergies   Allergen Reactions    Bactrim [Sulfamethoxazole-Trimethoprim] Swelling and Rash     Joint pain, neck swelling, rash    Other      Other reaction(s): rash    Pollen Extract Headache     Had an allergic reaction to Bactrim - swelling, hives, adenopathy    Home Medications    Current Outpatient Medications:     acetaminophen (TYLENOL) 500 mg tablet, Take 1,000 mg by mouth every 6 (six) hours as needed for mild pain, Disp: , Rfl:     bisacodyl (DULCOLAX) 5 mg EC tablet, Take 5 mg by mouth daily , Disp: , Rfl:     Ergocalciferol 2000 units CAPS, Take 2,000 Units by mouth daily, Disp: 30 capsule, Rfl: 0    OnabotulinumtoxinA (BOTOX IJ), Inject as directed For Migraines by neurology, Disp: , Rfl:     Probiotic Product (PROBIOTIC-10 PO), Take 1 tablet by mouth daily , Disp: , Rfl:     Triamcinolone Acetonide (NASACORT ALLERGY 24HR) 55 MCG/ACT AERO, into each nostril, Disp: , Rfl:     Albuterol (VENTOLIN IN), Inhale, Disp: , Rfl:    amitriptyline (ELAVIL) 10 mg tablet, TAKE 2 TABLETS DAILY AT BEDTIME, Disp: 180 tablet, Rfl: 3    ascorbic acid (VITAMIN C) 250 mg tablet, Take 250 mg by mouth daily, Disp: , Rfl:     benzonatate (TESSALON PERLES) 100 mg capsule, Take 1 capsule (100 mg total) by mouth 3 (three) times a day as needed for cough, Disp: 20 capsule, Rfl: 0    calcium carbonate-vitamin D (OSCAL-D) 500 mg-200 units per tablet, Take 2 tablets by mouth daily, Disp: , Rfl:     LORazepam (ATIVAN) 0 5 mg tablet, Take 1 tablet (0 5 mg total) by mouth daily as needed for anxiety, Disp: 15 tablet, Rfl: 0    Magnesium 100 MG CAPS, Take 1 capsule by mouth daily , Disp: , Rfl:     methylprednisolone (MEDROL) 4 mg tablet, Take 4 mg by mouth 2 (two) times a day, Disp: , Rfl:     omeprazole (PriLOSEC) 40 MG capsule, Take 1 capsule (40 mg total) by mouth daily before breakfast, Disp: 90 capsule, Rfl: 1    Turmeric, Curcuma Longa, (CURCUMIN) POWD, Take 1 tablet by mouth daily , Disp: , Rfl:     Zinc Sulfate (ZINC-220 PO), Take by mouth, Disp: , Rfl:     Past Medical History  Past Medical History:   Diagnosis Date    Anal fissure     Batres's esophagus without dysplasia 5/20/2020    Family history of colon cancer in mother 10/19/2018    Gastritis 1/8/2020    Gastroesophageal reflux disease 5/20/2020    Generalized anxiety disorder 9/10/2018    Migraine     Migraine without aura and without status migrainosus, not intractable 5/17/2019       Past Surgical History   Past Surgical History:   Procedure Laterality Date    COLONOSCOPY  07/2019    HEMORRHOID SURGERY      GA REMOVAL ANAL FISTULA,SUBCUTANEOUS N/A 3/26/2020    Procedure: FISTULOTOMY;  Surgeon: Zane Saha MD;  Location: [facility];  Service: Colorectal    GA SURG DIAGNOSTIC EXAM, ANORECTAL N/A 3/26/2020    Procedure: EXAM UNDER ANESTHESIA (EUA), ENDOANAL ULTRASOUND;  Surgeon: Zane Saha MD;  Location: [facility];  Service: Colorectal    UPPER GASTROINTESTINAL ENDOSCOPY  2019    VARICOSE VEIN SURGERY         Family History    Family History   Problem Relation Age of Onset    Colon cancer Mother     Coronary artery disease Maternal Grandmother     Coronary artery disease Paternal Grandmother     Hypertension Family     Colon cancer Maternal Grandfather    maternal aunt - lupus    Social History  Occupation: [employer]  Social History     Substance and Sexual Activity   Alcohol Use Yes    Comment: Socially      Social History     Substance and Sexual Activity   Drug Use No     Social History     Tobacco Use   Smoking Status Former Smoker    Quit date:     Years since quittin 0   Smokeless Tobacco Never Used   drinks wine a couple of times a week    Objective:  Vitals:    10/09/20 1341   BP: 123/77   BP Location: Left arm   Patient Position: Sitting   Cuff Size: Standard   Pulse: 90   Temp: 98 8 °F (37 1 °C)   TempSrc: Temporal   Weight: 55 2 kg (121 lb 12 8 oz)   Height: 5' (1 524 m)       Physical Exam  Constitutional:       General: She is not in acute distress  Appearance: She is well-developed  HENT:      Head: Normocephalic and atraumatic  Eyes:      General: Lids are normal  No scleral icterus  Conjunctiva/sclera: Conjunctivae normal    Neck:      Musculoskeletal: Neck supple  No muscular tenderness  Thyroid: No thyromegaly  Cardiovascular:      Rate and Rhythm: Normal rate and regular rhythm  Heart sounds: S1 normal and S2 normal  No murmur  No friction rub  Pulmonary:      Effort: Pulmonary effort is normal  No tachypnea or respiratory distress  Breath sounds: Normal breath sounds  No wheezing, rhonchi or rales  Musculoskeletal:         General: No swelling or tenderness  Lymphadenopathy:      Head:      Right side of head: No submental or submandibular adenopathy  Left side of head: No submental or submandibular adenopathy  Cervical: No cervical adenopathy     Skin:     General: Skin is warm and dry  Findings: No rash  Nails: There is no clubbing  Neurological:      Mental Status: She is alert  Sensory: No sensory deficit  Psychiatric:         Behavior: Behavior normal  Behavior is cooperative  Reviewed labs and imaging  Imaging:   CXR 1/6/21 - unremarkable    Labs:   Orders Only on 09/21/2020   Component Date Value Ref Range Status    SARS-CoV-2  09/21/2020 Not Detected  Not Detected Final    Comment: This nucleic acid amplification test was developed and its performance  characteristics determined by [employer]  Nucleic acid  amplification tests include PCR and TMA  This test has not been FDA  cleared or approved  This test has been authorized by FDA under an  Emergency Use Authorization (EUA)  This test is only authorized for  the duration of time the declaration that circumstances exist  justifying the authorization of the emergency use of in vitro  diagnostic tests for detection of SARS-CoV-2 virus and/or diagnosis  of COVID-19 infection under section 564(b)(1) of the Act, 21 U  S C   098AFJ-8(F) (1), unless the authorization is terminated or revoked  sooner  When diagnostic testing is negative, the possibility of a false  negative result should be considered in the context of a patient's  recent exposures and the presence of clinical signs and symptoms  consistent with COVID-19  An individual without symptoms of COVID-19  and who is not shedding SARS-CoV-2 virus would                            expect to have a  negative (not detected) result in this assay     Admission on 03/26/2020, Discharged on 03/26/2020   Component Date Value Ref Range Status    EXT Preg Test, Ur 03/26/2020 Negative  Negative Final    Control 03/26/2020 Valid  Valid Final    Case Report 03/26/2020    Final                    Value:Surgical Pathology Report                         Case: [id number]                                   Authorizing Provider:  Debora Fernandez MD Collected:           03/26/2020 1325              Ordering Location:     [address] [place] [address]      Received:            03/26/2020 [facility]                                                      Pathologist:           Alondra Velez MD                                                                Specimen:    Anus                                                                                       Final Diagnosis 03/26/2020    Final                    Value: This result contains rich text formatting which cannot be displayed here   Additional Information 03/26/2020    Final                    Value: This result contains rich text formatting which cannot be displayed here  Emma Salas 03/26/2020    Final                    Value: This result contains rich text formatting which cannot be displayed here   Clinical Information 03/26/2020    Final                    Value:Anal pain  Fistula

## 2020-10-09 NOTE — TELEPHONE ENCOUNTER
Botox number of units:200   Botox quantity: 1  Arrived at what location:   Lot number: [id number]  Expiration Date: 06/2023

## 2020-10-09 NOTE — PATIENT INSTRUCTIONS
Do labs today, will call with results  Ask GI doctor about meloxicam    Return to clinic in 5 months      Hydroxychloroquine (By mouth)   Hydroxychloroquine (yid-qrfo-uv-KLOR-oh-kwin)  Treats or prevents malaria  Also used to treat lupus and arthritis  Brand Name(s): Plaquenil   There may be other brand names for this medicine  When This Medicine Should Not Be Used: You should not use this medicine if you have ever had an allergic reaction to hydroxychloroquine  How to Use This Medicine:   Tablet  · Your doctor will tell you how much to take and how often  You should not use this medicine more often or in greater amounts than your doctor ordered  · Take this medicine with food or milk  If a dose is missed:   · If you take one dose a week, take the missed dose as soon as possible  Then return to your regular dosing schedule  · If you take one or more doses daily, take the missed dose as soon as possible  If it is almost time for your next dose, wait until then to take your medicine and skip the missed dose  · You should not use two doses at the same time  How to Store and Dispose of This Medicine:   · Store at room temperature, away from heat, moisture, and direct light  · Keep all medicine out of the reach of children  Drugs and Foods to Avoid:      Ask your doctor or pharmacist before using any other medicine, including over-the-counter medicines, vitamins, and herbal products  Warnings While Using This Medicine:   · If you are pregnant or breastfeeding, talk to your doctor before taking this medicine  · Talk to your doctor if you have vision or eye problems, liver disease, skin disorder, or any other medical problems before you take this medicine  · This medicine may cause dizziness and vision changes  Be careful while driving a car or operating machinery    Possible Side Effects While Using This Medicine:   Call your doctor right away if you notice any of these side effects:  · Wheezing or trouble breathing  · Severe or bloody diarrhea  · Blurred vision or eye problems  · Skin rash or itching  · Unexplained fever, unusual bleeding or bruising  If you notice these less serious side effects, talk with your doctor:   · Headache  · Nausea or upset stomach  · Diarrhea  · Appetite loss  · Bleaching of hair or loss of hair  If you notice other side effects that you think are caused by this medicine, tell your doctor  Call your doctor for medical advice about side effects  You may report side effects to FDA at 9-534-BLC-7880  © 2017 [address]  Information is for End User's use only and may not be sold, redistributed or otherwise used for commercial purposes  The above information is an  only  It is not intended as medical advice for individual conditions or treatments  Talk to your doctor, nurse or pharmacist before following any medical regimen to see if it is safe and effective for you  Methotrexate (By mouth)   Methotrexate (meth-oh-TREX-ate)  Treats several kinds of cancer, including cancer of the blood, bone, lung, breast, head, or neck  Also treats rheumatoid arthritis and psoriasis (a skin disease)  Brand Name(s): Trexall   There may be other brand names for this medicine  When This Medicine Should Not Be Used: You should not use this medicine if you have had an allergic reaction to methotrexate or if you are breastfeeding  You should not use this medicine for arthritis or psoriasis if you are pregnant, or if you have liver disease or certain problems with your blood or immune system  Make sure your doctor knows if you drink alcohol of any kind on a regular basis  How to Use This Medicine:   Tablet  · Medicines used to treat cancer are very strong and can have many side effects  Before receiving this medicine, make sure you understand all the risks and benefits  It is important for you to work closely with your doctor during your treatment    · Your doctor will tell you how much medicine to use  Do not use more than directed  The correct schedule for this medicine is different for arthritis, psoriasis, and different kinds of cancer  For example, people who have arthritis or psoriasis often take this medicine only once a week  · Make sure you understand your personal dosing schedule  Ask your doctor and pharmacist if you are not sure when or how often to take your medicine  · If you take the medicine only once a week, it is best to take it on the same day each week  · Write down on a calendar or piece of paper when you are supposed to use your medicine  Keep the calendar or paper where you can see it  After you take your dose of medicine, cross off that date on your calendar or paper  This will help you remember if you took the medicine or if you still need to take it  If you need other ideas to help you keep track of your schedule, ask your pharmacist or other healthcare provider  If a dose is missed:   · Take a dose as soon as you remember  If it is almost time for your next dose, wait until then and take a regular dose  Do not take extra medicine to make up for a missed dose  · If you use this medicine only once a week and you miss a dose or forget to use your medicine, skip the missed dose and use your medicine as soon as possible  Return to your regular schedule the following week  Do not use extra medicine to make up for a missed dose  How to Store and Dispose of This Medicine:   · Store the medicine in a closed container at room temperature, away from heat, moisture, and direct light  · Ask your pharmacist, doctor, or health caregiver about the best way to dispose of any outdated medicine or medicine no longer needed  · Keep all medicine out of the reach of children  Never share your medicine with anyone    Drugs and Foods to Avoid:   Ask your doctor or pharmacist before using any other medicine, including over-the-counter medicines, vitamins, and herbal products  · There are many drugs that can interact with methotrexate  Some of these drugs are aspirin, phenytoin (Dilantin®), theophylline (Yan-Dur®), antibiotics (such as penicillin, tetracycline, Bactrim®, Septra®), and vitamin supplements that contain folic acid  Make sure your doctor knows about ALL other medicines you are using  · If you have bone cancer, ask your doctor if you need to avoid using pain or arthritis medicine (such as aspirin, diclofenac, ibuprofen, indomethacin, Advil®, Aleve®, Bextra®, Celebrex®) or steroid medicines (such as prednisone)  · If you have arthritis, make sure your doctor knows about all other medicines you are using to treat arthritis (such as aspirin, gold, ibuprofen, prednisone, sulfasalazine, Azulfidine®, Celebrex®, Plaquenil®)  · Make sure your doctor knows about any cancer treatments you are using, including cisplatin (Charmaine Her[address]is [address] 112) or radiation  · Tell your doctor if you have used methotrexate before for any reason  · Talk to your doctor before getting flu shots or other vaccines while you are receiving methotrexate  Vaccines may not work as well while you are using this medicine  Warnings While Using This Medicine:   · Using this medicine while you are pregnant can harm your unborn baby  The medicine may also cause birth defects if the father is using it when his sexual partner becomes pregnant  Use an effective form of birth control during treatment with methotrexate  For a man, continue birth control for at least 3 months after stopping treatment  For a woman, continue birth control until you have had two menstrual periods after stopping treatment  If a pregnancy occurs while you are using this medicine, tell your doctor right away  · Make sure your doctor knows if you have kidney disease, liver disease, a stomach ulcer, colitis, diabetes, hepatitis, HIV or AIDS, problems with your immune system, or any kind of problem with your blood (such as anemia)   Tell your doctor if you get an infection of any kind  · This medicine may make your skin more sensitive to sunlight  Wear sunscreen  Do not use sunlamps or tanning beds  Tell your doctor if you have increased skin redness or other problems  · You may get infections more easily while you are using this medicine  Stay away from people with colds, flu, or other infections  Wash your hands often  · Your doctor will need to check your progress at regular visits while you are using this medicine  You may need to have blood tests or other kinds of medical tests  Be sure to keep all appointments  · Call your doctor right away if you think you have taken too much of this medicine  Possible Side Effects While Using This Medicine:   Call your doctor right away if you notice any of these side effects:  · Allergic reaction: Itching or hives, swelling in your face or hands, swelling or tingling in your mouth or throat, chest tightness, trouble breathing  · Blistering, peeling, red skin rash  · Cough, fever, chest pain, trouble breathing, blue lips or fingers  · Eyes or skin turn yellow, or dark-colored urine or pale stools  · Fever  · Nausea, vomiting, diarrhea, loss of appetite, stomach pain  · Sores or white patches on your lips, mouth, or throat  · Unusual bleeding, bruising, or weakness  If you notice these less serious side effects, talk with your doctor:   · Hair loss, headache, dizziness  If you notice other side effects that you think are caused by this medicine, tell your doctor  Call your doctor for medical advice about side effects  You may report side effects to FDA at 0-212-FDA-2352  © 2017 [address] [employer] Information is for End User's use only and may not be sold, redistributed or otherwise used for commercial purposes  The above information is an  only  It is not intended as medical advice for individual conditions or treatments   Talk to your doctor, nurse or pharmacist before following any medical regimen to see if it is safe and effective for you  Reactive Arthritis    What is reactive arthritis? -- Reactive arthritis is a kind of arthritis that happens after certain infections  It causes pain and swelling in joints  Reactive arthritis usually affects people who have or just had:  ?Food poisoning or another kind of infection of the intestines  ? An infection that you catch through sex   In the past, reactive arthritis was sometimes called "Hortencia syndrome "  What are the symptoms of reactive arthritis? -- The main symptoms of reactive arthritis are pain and swelling in the joints  These usually happen 1 to 4 weeks after an infection  In most cases, the symptoms affect only a few joints, usually in the knees, ankles, or feet  Other symptoms might include:  ?Pain in the tendons in the feet and ankles (tendons are tough bands of tissue that connect muscles to bones)  ? Irritation of the eye called "conjunctivitis" (also known as pink eye)  ? Pain when urinating  Is there a test for reactive arthritis? -- No  There is no test  But if your doctor or nurse can figure out what type of germ caused your infection, he or she should be able to tell if you have reactive arthritis  Your doctor or nurse can test your stool (bowel movements) or urine to look for certain kinds of germs  If your doctor or nurse can't tell what germs caused your infection, he or she will study your symptoms to decide how likely it is that you have reactive arthritis  How is reactive arthritis treated? -- The symptoms of reactive arthritis are treated with medicines, including:  ? NSAIDs - This is a large group of medicines that includes ibuprofen (sample brand names: Advil, Motrin) and naproxen (sample brand name: Aleve)  Your doctor or nurse might prescribe a dose higher than you would normally take to relieve pain  ? Other medicines - If NSAIDs do not help your symptoms, your doctor or nurse might give you a shot of steroids  Steroids help reduce inflammation  These are not the same as the steroids some athletes take illegally  Your doctor might also give you more steroids to take at home  There are also other medicines that might help if your symptoms do not get better with NSAIDs or steroids  ? Eye drops - Special drops can help relieve redness and irritation in your eyes  But if you have eye pain or trouble seeing, visit an eye doctor to make sure you don't have a more serious problem  Antibiotics do not usually help with the joint symptoms of reactive arthritis  Even so, your doctor or nurse might prescribe them if you still have an infection  When will I feel better? -- Most people with reactive arthritis get better quickly  Some people continue to notice symptoms, either constantly or just once in a while  If your back gets very stiff and sore, see your doctor or nurse   This might mean that your reactive arthritis has turned into a more serious problem

## 2020-10-19 DIAGNOSIS — G43.009 MIGRAINE WITHOUT AURA AND WITHOUT STATUS MIGRAINOSUS, NOT INTRACTABLE: ICD-10-CM

## 2020-10-21 ENCOUNTER — TELEPHONE (OUTPATIENT)
Dept: NEUROLOGY | Facility: CLINIC | Age: 45
End: 2020-10-21

## 2020-10-21 RX ORDER — AMITRIPTYLINE HYDROCHLORIDE 10 MG/1
20 TABLET, FILM COATED ORAL
Qty: 60 TABLET | Refills: 0 | Status: SHIPPED | OUTPATIENT
Start: 2020-10-21 | End: 2020-12-28

## 2020-10-21 RX ORDER — AMITRIPTYLINE HYDROCHLORIDE 10 MG/1
20 TABLET, FILM COATED ORAL
Qty: 180 TABLET | Refills: 0 | Status: SHIPPED | OUTPATIENT
Start: 2020-10-21 | End: 2021-01-11

## 2020-10-28 ENCOUNTER — TELEPHONE (OUTPATIENT)
Dept: NEUROLOGY | Facility: CLINIC | Age: 45
End: 2020-10-28

## 2020-11-02 DIAGNOSIS — K31.A0 INTESTINAL METAPLASIA OF GASTRIC MUCOSA: ICD-10-CM

## 2020-11-03 RX ORDER — OMEPRAZOLE 40 MG/1
40 CAPSULE, DELAYED RELEASE ORAL
Qty: 30 CAPSULE | Refills: 0 | Status: SHIPPED | OUTPATIENT
Start: 2020-11-03 | End: 2020-12-11 | Stop reason: SDUPTHER

## 2020-11-11 ENCOUNTER — PROCEDURE VISIT (OUTPATIENT)
Dept: NEUROLOGY | Facility: CLINIC | Age: 45
End: 2020-11-11
Payer: COMMERCIAL

## 2020-11-11 VITALS — HEART RATE: 74 BPM | DIASTOLIC BLOOD PRESSURE: 70 MMHG | SYSTOLIC BLOOD PRESSURE: 122 MMHG | TEMPERATURE: 97.5 F

## 2020-11-11 DIAGNOSIS — G24.3 CERVICAL DYSTONIA: Primary | ICD-10-CM

## 2020-11-11 DIAGNOSIS — F41.1 GENERALIZED ANXIETY DISORDER: ICD-10-CM

## 2020-11-11 PROCEDURE — 64616 CHEMODENERV MUSC NECK DYSTON: CPT | Performed by: PSYCHIATRY & NEUROLOGY

## 2020-12-11 DIAGNOSIS — K31.A0 INTESTINAL METAPLASIA OF GASTRIC MUCOSA: ICD-10-CM

## 2020-12-11 RX ORDER — OMEPRAZOLE 40 MG/1
40 CAPSULE, DELAYED RELEASE ORAL
Qty: 30 CAPSULE | Refills: 3 | Status: SHIPPED | OUTPATIENT
Start: 2020-12-11 | End: 2020-12-21

## 2020-12-21 ENCOUNTER — TELEPHONE (OUTPATIENT)
Dept: GASTROENTEROLOGY | Facility: CLINIC | Age: 45
End: 2020-12-21

## 2020-12-21 DIAGNOSIS — K31.A0 INTESTINAL METAPLASIA OF GASTRIC MUCOSA: ICD-10-CM

## 2020-12-21 RX ORDER — OMEPRAZOLE 40 MG/1
40 CAPSULE, DELAYED RELEASE ORAL
Qty: 90 CAPSULE | Refills: 3 | Status: SHIPPED | OUTPATIENT
Start: 2020-12-21 | End: 2021-01-14 | Stop reason: SDUPTHER

## 2020-12-28 ENCOUNTER — TELEMEDICINE (OUTPATIENT)
Dept: FAMILY MEDICINE CLINIC | Facility: CLINIC | Age: 45
End: 2020-12-28
Payer: COMMERCIAL

## 2020-12-28 ENCOUNTER — TELEPHONE (OUTPATIENT)
Dept: NEUROLOGY | Facility: CLINIC | Age: 45
End: 2020-12-28

## 2020-12-28 DIAGNOSIS — Z20.822 SUSPECTED COVID-19 VIRUS INFECTION: Primary | ICD-10-CM

## 2020-12-28 PROBLEM — K60.30 FISTULA-IN-ANO: Status: RESOLVED | Noted: 2019-07-31 | Resolved: 2020-12-28

## 2020-12-28 PROBLEM — R10.84 GENERALIZED ABDOMINAL PAIN: Status: RESOLVED | Noted: 2020-01-08 | Resolved: 2020-12-28

## 2020-12-28 PROBLEM — L98.8 FISTULA: Status: RESOLVED | Noted: 2020-03-26 | Resolved: 2020-12-28

## 2020-12-28 PROBLEM — K60.3 FISTULA-IN-ANO: Status: RESOLVED | Noted: 2019-07-31 | Resolved: 2020-12-28

## 2020-12-28 PROBLEM — K61.1 PERIRECTAL ABSCESS: Status: RESOLVED | Noted: 2019-06-18 | Resolved: 2020-12-28

## 2020-12-28 PROBLEM — K29.70 GASTRITIS: Status: RESOLVED | Noted: 2020-01-08 | Resolved: 2020-12-28

## 2020-12-28 PROBLEM — K62.5 RECTAL BLEEDING: Status: RESOLVED | Noted: 2020-01-08 | Resolved: 2020-12-28

## 2020-12-28 PROBLEM — K60.2 FISSURE, ANAL: Status: RESOLVED | Noted: 2020-01-08 | Resolved: 2020-12-28

## 2020-12-28 PROCEDURE — 99214 OFFICE O/P EST MOD 30 MIN: CPT | Performed by: FAMILY MEDICINE

## 2020-12-28 PROCEDURE — 1036F TOBACCO NON-USER: CPT | Performed by: FAMILY MEDICINE

## 2020-12-29 DIAGNOSIS — Z20.822 SUSPECTED COVID-19 VIRUS INFECTION: ICD-10-CM

## 2020-12-29 PROCEDURE — U0003 INFECTIOUS AGENT DETECTION BY NUCLEIC ACID (DNA OR RNA); SEVERE ACUTE RESPIRATORY SYNDROME CORONAVIRUS 2 (SARS-COV-2) (CORONAVIRUS DISEASE [COVID-19]), AMPLIFIED PROBE TECHNIQUE, MAKING USE OF HIGH THROUGHPUT TECHNOLOGIES AS DESCRIBED BY CMS-2020-01-R: HCPCS | Performed by: FAMILY MEDICINE

## 2020-12-30 LAB — SARS-COV-2 RNA SPEC QL NAA+PROBE: DETECTED

## 2020-12-30 NOTE — RESULT ENCOUNTER NOTE
As per our conversation, test for COVID is positive  You need to remain isolated for a total of 10 days since the onset of symptoms  Suggest Tylenol and ibuprofen for fever and muscle aches and use of Sudafed from behind the counter and Afrin nose spray for nasal congestion  Humidifier works as well  Daughter should be tested on day 5 after their last exposure to you

## 2021-01-04 ENCOUNTER — TELEMEDICINE (OUTPATIENT)
Dept: FAMILY MEDICINE CLINIC | Facility: CLINIC | Age: 46
End: 2021-01-04
Payer: COMMERCIAL

## 2021-01-04 DIAGNOSIS — U07.1 COVID-19 VIRUS INFECTION: Primary | ICD-10-CM

## 2021-01-04 PROCEDURE — 99213 OFFICE O/P EST LOW 20 MIN: CPT | Performed by: FAMILY MEDICINE

## 2021-01-04 NOTE — PROGRESS NOTES
COVID-19 Virtual Visit     Assessment/Plan:    Problem List Items Addressed This Visit     None      Visit Diagnoses     COVID-19 virus infection    -  Primary         Disposition:           Seems to have turned the corner  Okay to continue Medrol Dosepak although she does not think she was wheezing  Advised that the fatigue can last for a while  She should be isolated for additional 4 days  Call if symptoms worsen  I have spent 9 minutes directly with the patient  Encounter provider Shabana العلي MD    Provider located at [address] [place] [address]  [address]ia [address] [address] [phone]    Recent Visits  Date Type Provider Dept   12/28/20 Telemedicine Shabana العلي MD [facility]   Showing recent visits within past 7 days and meeting all other requirements     Today's Visits  Date Type Provider Dept   01/04/21 Telemedicine Shabana العلي MD [facility]   Showing today's visits and meeting all other requirements     Future Appointments  No visits were found meeting these conditions  Showing future appointments within next 150 days and meeting all other requirements      This virtual check-in was done via [employer] and patient was informed that this is not a secure, HIPAA-compliant platform  She agrees to proceed  Patient agrees to participate in a virtual check in via telephone or video visit instead of presenting to the office to address urgent/immediate medical needs  Patient is aware this is a billable service  After connecting through [facility], the patient was identified by name and date of birth  Gabe Bach was informed that this was a telemedicine visit and that the exam was being conducted confidentially over secure lines  Gabe Bach acknowledged consent and understanding of privacy and security of the telemedicine visit   I informed the patient that I have reviewed her record in Epic and presented the opportunity for her to ask any questions regarding the visit today  The patient agreed to participate  Subjective:   Rafia Rizzo is a 41 y o  female who has been screened for COVID-19  Symptom change since last report: improving  Date of symptom onset: 12/29/2020    Patient's symptoms include fever (101), fatigue, anosmia, loss of taste, cough (dry cough, couldn't clear), shortness of breath (dizzy), chest tightness, nausea and headache (last week)  Patient denies vomiting and diarrhea  Greg Jensen has been staying home and has isolated themselves in her home  Got worse over the weekend  Tried calling our office but could not get through  [address]  Treated with a Medrol Ha and an inhaler  She does not think that she was wheezing  Better than she was 2 days ago           Lab Results   Component Value Date    SARSCOV2 Detected (A) 12/29/2020     Past Medical History:   Diagnosis Date    Anal fissure     Batres's esophagus without dysplasia 5/20/2020    Family history of colon cancer in mother 10/19/2018    Gastritis 1/8/2020    Gastroesophageal reflux disease 5/20/2020    Generalized anxiety disorder 9/10/2018    Migraine     Migraine without aura and without status migrainosus, not intractable 5/17/2019     Past Surgical History:   Procedure Laterality Date    COLONOSCOPY  07/2019    HEMORRHOID SURGERY      DE REMOVAL ANAL FISTULA,SUBCUTANEOUS N/A 3/26/2020    Procedure: FISTULOTOMY;  Surgeon: Dodie Marshall MD;  [facility];  Service: Colorectal    DE SURG DIAGNOSTIC EXAM, ANORECTAL N/A 3/26/2020    Procedure: EXAM UNDER ANESTHESIA (EUA), ENDOANAL ULTRASOUND;  Surgeon: Dodie Marshall MD;  [facility];  Service: Colorectal    UPPER GASTROINTESTINAL ENDOSCOPY  07/2019    VARICOSE VEIN SURGERY       Current Outpatient Medications   Medication Sig Dispense Refill    acetaminophen (TYLENOL) 500 mg tablet Take 1,000 mg by mouth every 6 (six) hours as needed for mild pain      amitriptyline (ELAVIL) 10 mg tablet Take 2 tablets (20 mg total) by mouth daily at bedtime 180 tablet 0    bisacodyl (DULCOLAX) 5 mg EC tablet Take 5 mg by mouth daily       calcium carbonate-vitamin D (OSCAL-D) 500 mg-200 units per tablet Take 2 tablets by mouth daily      Ergocalciferol 2000 units CAPS Take 2,000 Units by mouth daily 30 capsule 0    LORazepam (ATIVAN) 0 5 mg tablet Take 1 tablet (0 5 mg total) by mouth daily as needed for anxiety 15 tablet 0    Magnesium 100 MG CAPS Take 1 capsule by mouth daily       omeprazole (PriLOSEC) 40 MG capsule Take 1 capsule (40 mg total) by mouth daily before breakfast 90 capsule 3    OnabotulinumtoxinA (BOTOX IJ) Inject as directed For Migraines by neurology      Probiotic Product (PROBIOTIC-10 PO) Take 1 tablet by mouth daily       Triamcinolone Acetonide (NASACORT ALLERGY 24HR) 55 MCG/ACT AERO into each nostril      Turmeric, Curcuma Longa, (CURCUMIN) POWD Take 1 tablet by mouth daily        No current facility-administered medications for this visit  Allergies   Allergen Reactions    Bactrim [Sulfamethoxazole-Trimethoprim] Swelling and Rash     Joint pain, neck swelling, rash    Other      Other reaction(s): rash    Pollen Extract Headache       Review of Systems   Constitutional: Positive for fatigue and fever (101)  Respiratory: Positive for cough (dry cough, couldn't clear), chest tightness and shortness of breath (dizzy)  Gastrointestinal: Positive for nausea  Negative for diarrhea and vomiting  Neurological: Positive for headaches (last week)  Objective: There were no vitals filed for this visit  Physical Exam     Breathing comfortably  No shortness of breath noted  Alert  VIRTUAL VISIT DISCLAIMER    Therese Hdz acknowledges that she has consented to an online visit or consultation   She understands that the online visit is based solely on information provided by her, and that, in the absence of a face-to-face physical evaluation by the physician, the diagnosis she receives is both limited and provisional in terms of accuracy and completeness  This is not intended to replace a full medical face-to-face evaluation by the physician  Stephan Méndez understands and accepts these terms

## 2021-01-06 ENCOUNTER — OFFICE VISIT (OUTPATIENT)
Dept: URGENT CARE | Age: 46
End: 2021-01-06
Payer: COMMERCIAL

## 2021-01-06 ENCOUNTER — TELEPHONE (OUTPATIENT)
Dept: FAMILY MEDICINE CLINIC | Facility: CLINIC | Age: 46
End: 2021-01-06

## 2021-01-06 ENCOUNTER — TELEMEDICINE (OUTPATIENT)
Dept: FAMILY MEDICINE CLINIC | Facility: CLINIC | Age: 46
End: 2021-01-06
Payer: COMMERCIAL

## 2021-01-06 ENCOUNTER — APPOINTMENT (OUTPATIENT)
Dept: RADIOLOGY | Age: 46
End: 2021-01-06
Payer: COMMERCIAL

## 2021-01-06 VITALS
HEART RATE: 74 BPM | RESPIRATION RATE: 18 BRPM | TEMPERATURE: 95.7 F | WEIGHT: 120 LBS | HEIGHT: 60 IN | OXYGEN SATURATION: 99 % | BODY MASS INDEX: 23.56 KG/M2

## 2021-01-06 VITALS — TEMPERATURE: 98.2 F | OXYGEN SATURATION: 99 % | HEART RATE: 99 BPM

## 2021-01-06 DIAGNOSIS — U07.1 COVID-19: Primary | ICD-10-CM

## 2021-01-06 DIAGNOSIS — R05.9 COUGH: ICD-10-CM

## 2021-01-06 DIAGNOSIS — R05.9 COUGH: Primary | ICD-10-CM

## 2021-01-06 DIAGNOSIS — U07.1 COVID-19: ICD-10-CM

## 2021-01-06 PROCEDURE — 1036F TOBACCO NON-USER: CPT | Performed by: FAMILY MEDICINE

## 2021-01-06 PROCEDURE — 71046 X-RAY EXAM CHEST 2 VIEWS: CPT

## 2021-01-06 PROCEDURE — 99213 OFFICE O/P EST LOW 20 MIN: CPT | Performed by: FAMILY MEDICINE

## 2021-01-06 PROCEDURE — 99213 OFFICE O/P EST LOW 20 MIN: CPT | Performed by: PHYSICIAN ASSISTANT

## 2021-01-06 RX ORDER — MULTIVIT WITH MINERALS/LUTEIN
250 TABLET ORAL DAILY
COMMUNITY
End: 2021-04-29

## 2021-01-06 RX ORDER — METHYLPREDNISOLONE 4 MG/1
4 TABLET ORAL 2 TIMES DAILY
COMMUNITY
End: 2021-04-29

## 2021-01-06 RX ORDER — BENZONATATE 100 MG/1
100 CAPSULE ORAL 3 TIMES DAILY PRN
Qty: 20 CAPSULE | Refills: 0 | Status: SHIPPED | OUTPATIENT
Start: 2021-01-06 | End: 2021-04-29

## 2021-01-06 NOTE — PROGRESS NOTES
[facility]        NAME: Ladonna Wang is a 41 y o  female  : 1975    MRN: [id number]  DATE: 2021  TIME: 4:31 PM    Assessment and Plan   Cough [R05]  1  Cough  XR chest pa & lateral    benzonatate (TESSALON PERLES) 100 mg capsule   2  COVID-19           Patient Instructions     Patient's vital signs and heart and lung exam are stable   -chest x-ray results are pending  -continue with inhalers steroids as directed  -start Tessalon Perles as needed for cough  Follow up with PCP in 1-2 days  Proceed to  ER if symptoms worsen  Chief Complaint     Chief Complaint   Patient presents with    Cough     burning chest tightness  Weyman Mendoza Fever    Shortness of Breath     felt faint with coughing jag    COVID-19     started mild ss 1 wk ago         History of Present Illness       Patient of the positive  She was sent here by her PCP due to worsening shortness of breath, chest tightness and cough  She has been monitoring her pulse ox at home and has been above 94%  She states she had 1 episode of severe coughing rate dropped down to 84% right back up  She does complain of some chest tightness as well  She has been been placed on an inhaler and steroids over the weekend which have helped some  Review of Systems   Review of Systems   Constitutional: Negative  HENT: Negative  Respiratory: Positive for cough, chest tightness and shortness of breath  Negative for wheezing  Cardiovascular: Negative  Gastrointestinal: Negative  Musculoskeletal: Negative  Neurological: Negative  Psychiatric/Behavioral: Negative            Current Medications       Current Outpatient Medications:     acetaminophen (TYLENOL) 500 mg tablet, Take 1,000 mg by mouth every 6 (six) hours as needed for mild pain, Disp: , Rfl:     Albuterol (VENTOLIN IN), Inhale, Disp: , Rfl:     amitriptyline (ELAVIL) 10 mg tablet, Take 2 tablets (20 mg total) by mouth daily at bedtime, Disp: 180 tablet, Rfl: 0   ascorbic acid (VITAMIN C) 250 mg tablet, Take 250 mg by mouth daily, Disp: , Rfl:     bisacodyl (DULCOLAX) 5 mg EC tablet, Take 5 mg by mouth daily , Disp: , Rfl:     calcium carbonate-vitamin D (OSCAL-D) 500 mg-200 units per tablet, Take 2 tablets by mouth daily, Disp: , Rfl:     Ergocalciferol 2000 units CAPS, Take 2,000 Units by mouth daily, Disp: 30 capsule, Rfl: 0    LORazepam (ATIVAN) 0 5 mg tablet, Take 1 tablet (0 5 mg total) by mouth daily as needed for anxiety, Disp: 15 tablet, Rfl: 0    Magnesium 100 MG CAPS, Take 1 capsule by mouth daily , Disp: , Rfl:     methylprednisolone (MEDROL) 4 mg tablet, Take 4 mg by mouth 2 (two) times a day, Disp: , Rfl:     omeprazole (PriLOSEC) 40 MG capsule, Take 1 capsule (40 mg total) by mouth daily before breakfast, Disp: 90 capsule, Rfl: 3    OnabotulinumtoxinA (BOTOX IJ), Inject as directed For Migraines by neurology, Disp: , Rfl:     Probiotic Product (PROBIOTIC-10 PO), Take 1 tablet by mouth daily , Disp: , Rfl:     Triamcinolone Acetonide (NASACORT ALLERGY 24HR) 55 MCG/ACT AERO, into each nostril, Disp: , Rfl:     Turmeric, Curcuma Longa, (CURCUMIN) POWD, Take 1 tablet by mouth daily , Disp: , Rfl:     Zinc Sulfate (ZINC-220 PO), Take by mouth, Disp: , Rfl:     benzonatate (TESSALON PERLES) 100 mg capsule, Take 1 capsule (100 mg total) by mouth 3 (three) times a day as needed for cough, Disp: 20 capsule, Rfl: 0    Current Allergies     Allergies as of 01/06/2021 - Reviewed 01/06/2021   Allergen Reaction Noted    Bactrim [sulfamethoxazole-trimethoprim] Swelling and Rash 04/28/2020    Other  09/10/2015    Pollen extract Headache 04/01/2014            The following portions of the patient's history were reviewed and updated as appropriate: allergies, current medications, past family history, past medical history, past social history, past surgical history and problem list      Past Medical History:   Diagnosis Date    Anal fissure     Batres's esophagus without dysplasia 5/20/2020    Family history of colon cancer in mother 10/19/2018    Gastritis 1/8/2020    Gastroesophageal reflux disease 5/20/2020    Generalized anxiety disorder 9/10/2018    Migraine     Migraine without aura and without status migrainosus, not intractable 5/17/2019       Past Surgical History:   Procedure Laterality Date    COLONOSCOPY  07/2019    HEMORRHOID SURGERY      [place] REMOVAL ANAL FISTULA,SUBCUTANEOUS N/A 3/26/2020    Procedure: FISTULOTOMY;  Surgeon: Luigi Giles MD;  [facility];  Service: Colorectal    VT SURG DIAGNOSTIC EXAM, ANORECTAL N/A 3/26/2020    Procedure: EXAM UNDER ANESTHESIA (EUA), ENDOANAL ULTRASOUND;  Surgeon: Luigi Giles MD;  [facility];  Service: Colorectal    UPPER GASTROINTESTINAL ENDOSCOPY  07/2019    VARICOSE VEIN SURGERY         Family History   Problem Relation Age of Onset    Colon cancer Mother     Coronary artery disease Maternal Grandmother     Coronary artery disease Paternal Grandmother     Hypertension Family     Colon cancer Maternal Grandfather          Medications have been verified  Objective   Pulse 74   Temp (!) 95 7 °F (35 4 °C)   Resp 18   Ht 5' (1 524 m)   Wt 54 4 kg (120 lb)   LMP 01/06/2021 Comment: 3 weeks ago  SpO2 99%   BMI 23 44 kg/m²        Physical Exam     Physical Exam  Vitals signs and nursing note reviewed  Constitutional:       General: She is not in acute distress  Appearance: Normal appearance  She is not ill-appearing or toxic-appearing  Cardiovascular:      Rate and Rhythm: Normal rate and regular rhythm  Pulses: Normal pulses  Heart sounds: Normal heart sounds  Pulmonary:      Effort: Pulmonary effort is normal       Breath sounds: Normal breath sounds  No wheezing  Skin:     General: Skin is warm and dry  Neurological:      General: No focal deficit present  Mental Status: She is alert and oriented to person, place, and time  Psychiatric:         Mood and Affect: Mood normal          Behavior: Behavior normal

## 2021-01-06 NOTE — PROGRESS NOTES
COVID-19 Virtual Visit     Assessment/Plan:    Problem List Items Addressed This Visit     None      Visit Diagnoses     COVID-19    -  Primary    Cough        worse         Disposition:     I referred patient to one of our COVID-19 Respiratory Clinics  I recommended continued isolation until at least 24 hours have passed since recovery defined as resolution of fever without the use of fever-reducing medications AND improvement in COVID symptoms AND 10 days have passed since onset of symptoms (or 10 days have passed since date of first positive viral diagnostic test for asymptomatic patients)  Patient is very concerned about her symptoms and scared  Pt has an appointment today with respiratory clinic today at 2 PM     I have spent 19 minutes directly with the patient  Encounter provider Dee Garnett MD    Provider located at [facility]  [address] [place] [address]  [address] [address] [address] [phone]    Recent Visits  Date Type Provider Dept   01/06/21 Telemedicine Dee Garnett MD [facility]   01/06/21 Telephone Tesfaye Tse MA [facility]   01/04/21 Telemedicine Rajesh Gabriel MD [facility]   Showing recent visits within past 7 days and meeting all other requirements     Future Appointments  No visits were found meeting these conditions  Showing future appointments within next 150 days and meeting all other requirements      This virtual check-in was done via [employer] and patient was informed that this is not a secure, HIPAA-compliant platform  She agrees to proceed  Patient agrees to participate in a virtual check in via telephone or video visit instead of presenting to the office to address urgent/immediate medical needs  Patient is aware this is a billable service  After connecting through [facility], the patient was identified by name and date of birth   Ashley Goods was informed that this was a Pt up to bathroom to void with assistance. telemedicine visit and that the exam was being conducted confidentially over secure lines  My office door was closed  No one else was in the room  Natasha Vasquez acknowledged consent and understanding of privacy and security of the telemedicine visit  I informed the patient that I have reviewed her record in Epic and presented the opportunity for her to ask any questions regarding the visit today  The patient agreed to participate  Subjective:   Natasha Vasquez is a 41 y o  female who has been screened for COVID-19  Patient's symptoms include fever, nasal congestion, cough and chest tightness  Patient denies chills, fatigue, rhinorrhea, sore throat, shortness of breath, abdominal pain, nausea, vomiting, diarrhea, myalgias and headaches  Chandrakant Jeter has been staying home and has isolated themselves in her home  She is taking care to not share personal items and is cleaning all surfaces that are touched often, like counters, tabletops, and doorknobs using household cleaning sprays or wipes  She is wearing a mask when she leaves her room  Date of symptom onset: 12/28/2020    Cough is getting worse  This morning has severe episode of coughing, it scared her check, her pulse ox during coughing was 84  Recheck her pulse ox after stopped  coughing , it was 92 and then back to 97-99   cough was dry  , this morning stat having  Productive cough , raising gren  Phlegm  Occasionally, patient denied shortness of breath but she feels need to take a good deep breath ,to feel better occocionally  No fever x 24hrs without taking Tylenol  Pulse ox  99 now  taking medrol pack from [facility] provider, seen virtually this weekend , symptoms is not better   Pt is former smoker ,quik 16 years       Lab Results   Component Value Date    SARSCOV2 Detected (A) 12/29/2020     Past Medical History:   Diagnosis Date    Anal fissure     Batres's esophagus without dysplasia 5/20/2020    Family history of colon cancer in mother 10/19/2018   Lubna Mays Gastritis 1/8/2020    Gastroesophageal reflux disease 5/20/2020    Generalized anxiety disorder 9/10/2018    Migraine     Migraine without aura and without status migrainosus, not intractable 5/17/2019     Past Surgical History:   Procedure Laterality Date    COLONOSCOPY  07/2019    HEMORRHOID SURGERY      [place] REMOVAL ANAL FISTULA,SUBCUTANEOUS N/A 3/26/2020    Procedure: FISTULOTOMY;  Surgeon: Viktoria Morales MD;  [facility];  Service: Colorectal    [place] SURG DIAGNOSTIC EXAM, ANORECTAL N/A 3/26/2020    Procedure: EXAM UNDER ANESTHESIA (EUA), ENDOANAL ULTRASOUND;  Surgeon: Viktoria Morales MD;  [facility];  Service: Colorectal    UPPER GASTROINTESTINAL ENDOSCOPY  07/2019    VARICOSE VEIN SURGERY       Current Outpatient Medications   Medication Sig Dispense Refill    acetaminophen (TYLENOL) 500 mg tablet Take 1,000 mg by mouth every 6 (six) hours as needed for mild pain      amitriptyline (ELAVIL) 10 mg tablet Take 2 tablets (20 mg total) by mouth daily at bedtime 180 tablet 0    bisacodyl (DULCOLAX) 5 mg EC tablet Take 5 mg by mouth daily       Ergocalciferol 2000 units CAPS Take 2,000 Units by mouth daily 30 capsule 0    LORazepam (ATIVAN) 0 5 mg tablet Take 1 tablet (0 5 mg total) by mouth daily as needed for anxiety 15 tablet 0    methylprednisolone (MEDROL) 4 mg tablet Take 4 mg by mouth 2 (two) times a day      omeprazole (PriLOSEC) 40 MG capsule Take 1 capsule (40 mg total) by mouth daily before breakfast 90 capsule 3    OnabotulinumtoxinA (BOTOX IJ) Inject as directed For Migraines by neurology      Probiotic Product (PROBIOTIC-10 PO) Take 1 tablet by mouth daily       Triamcinolone Acetonide (NASACORT ALLERGY 24HR) 55 MCG/ACT AERO into each nostril      Albuterol (VENTOLIN IN) Inhale      ascorbic acid (VITAMIN C) 250 mg tablet Take 250 mg by mouth daily      benzonatate (TESSALON PERLES) 100 mg capsule Take 1 capsule (100 mg total) by mouth 3 (three) times a day as needed for cough 20 capsule 0    calcium carbonate-vitamin D (OSCAL-D) 500 mg-200 units per tablet Take 2 tablets by mouth daily      Magnesium 100 MG CAPS Take 1 capsule by mouth daily       Turmeric, Curcuma Longa, (CURCUMIN) POWD Take 1 tablet by mouth daily       Zinc Sulfate (ZINC-220 PO) Take by mouth       No current facility-administered medications for this visit  Allergies   Allergen Reactions    Bactrim [Sulfamethoxazole-Trimethoprim] Swelling and Rash     Joint pain, neck swelling, rash    Other      Other reaction(s): rash    Pollen Extract Headache       Review of Systems   Constitutional: Positive for fever  Negative for activity change, appetite change, chills, fatigue and unexpected weight change  HENT: Positive for congestion  Negative for ear discharge, ear pain, hearing loss, nosebleeds, rhinorrhea, sinus pressure, sore throat, tinnitus, trouble swallowing and voice change  Eyes: Negative for photophobia, pain and visual disturbance  Respiratory: Positive for cough and chest tightness  Negative for shortness of breath, wheezing and stridor  Cardiovascular: Negative for chest pain, palpitations and leg swelling  Gastrointestinal: Negative for abdominal pain, anal bleeding, blood in stool, constipation, diarrhea, nausea and vomiting  Endocrine: Negative for cold intolerance, heat intolerance, polydipsia and polyuria  Genitourinary: Negative for dysuria, frequency, hematuria and urgency  Musculoskeletal: Negative for arthralgias, back pain, gait problem, joint swelling, myalgias and neck pain  Skin: Negative for rash  Neurological: Negative for dizziness, tremors, seizures, syncope, speech difficulty, weakness, light-headedness and headaches  Hematological: Negative for adenopathy  Does not bruise/bleed easily  Psychiatric/Behavioral: Negative for agitation, behavioral problems, confusion, dysphoric mood, hallucinations and sleep disturbance   The patient is not nervous/anxious  Objective:    Vitals:    01/06/21 1209   Pulse: 99   Temp: 98 2 °F (36 8 °C)   SpO2: 99%       Physical Exam  Constitutional:       General: She is not in acute distress  Appearance: Normal appearance  She is well-developed  She is not ill-appearing or diaphoretic  HENT:      Head: Normocephalic and atraumatic  Eyes:      General: No scleral icterus  Neck:      Musculoskeletal: Normal range of motion and neck supple  Vascular: No JVD  Pulmonary:      Effort: Pulmonary effort is normal       Comments: No abnormal audible breath sounds  No muscle retraction of the upper chest   Seems breathing well  Abdominal:      Palpations: Abdomen is soft  Comments: Per patient examination  Musculoskeletal:         General: No deformity  Skin:     Coloration: Skin is not pale  Findings: No rash  Neurological:      Mental Status: She is alert and oriented to person, place, and time  Cranial Nerves: No cranial nerve deficit  Psychiatric:         Mood and Affect: Mood normal          Behavior: Behavior normal          Thought Content: Thought content normal          Judgment: Judgment normal        VIRTUAL VISIT DISCLAIMER    Jas Rust acknowledges that she has consented to an online visit or consultation  She understands that the online visit is based solely on information provided by her, and that, in the absence of a face-to-face physical evaluation by the physician, the diagnosis she receives is both limited and provisional in terms of accuracy and completeness  This is not intended to replace a full medical face-to-face evaluation by the physician  Jas Rust understands and accepts these terms

## 2021-01-06 NOTE — TELEPHONE ENCOUNTER
Patient called stating that she is on Day 8 of Covid, her cough is really bad that she becomes dizzy and short of breath with it  Spoke to Dr Camilo Ascencio, scheduled patient virtual due to symptoms worsening

## 2021-01-06 NOTE — PATIENT INSTRUCTIONS

## 2021-01-11 DIAGNOSIS — G43.009 MIGRAINE WITHOUT AURA AND WITHOUT STATUS MIGRAINOSUS, NOT INTRACTABLE: ICD-10-CM

## 2021-01-11 RX ORDER — AMITRIPTYLINE HYDROCHLORIDE 10 MG/1
TABLET, FILM COATED ORAL
Qty: 180 TABLET | Refills: 3 | Status: SHIPPED | OUTPATIENT
Start: 2021-01-11 | End: 2021-12-25

## 2021-01-14 DIAGNOSIS — F41.1 GENERALIZED ANXIETY DISORDER: ICD-10-CM

## 2021-01-14 DIAGNOSIS — K31.A0 INTESTINAL METAPLASIA OF GASTRIC MUCOSA: ICD-10-CM

## 2021-01-14 RX ORDER — LORAZEPAM 0.5 MG/1
0.5 TABLET ORAL DAILY PRN
Qty: 15 TABLET | Refills: 0 | Status: SHIPPED | OUTPATIENT
Start: 2021-01-14

## 2021-01-14 RX ORDER — OMEPRAZOLE 40 MG/1
40 CAPSULE, DELAYED RELEASE ORAL
Qty: 90 CAPSULE | Refills: 1 | Status: SHIPPED | OUTPATIENT
Start: 2021-01-14 | End: 2021-07-12

## 2021-01-14 NOTE — TELEPHONE ENCOUNTER
Medication: Lorazepam  Pharmacy: [facility]/[place]   Last refill: 3/13/20  Last office visit: 1/6/2021  Upcoming office visit: NA      03/14/2020  1  03/13/2020  LORAZEPAM 0 5 MG TABLET  15 0  15  [facility]  [id number]  [id number] [id number]  0   [employer]  [place]

## 2021-01-14 NOTE — TELEPHONE ENCOUNTER
GI Physician: Dr Samantha Montero for Medication: prilosec    Dose: 40 mg daily     Quantity: 90 day supply with refills  [facility]

## 2021-01-28 ENCOUNTER — TELEPHONE (OUTPATIENT)
Dept: NEUROLOGY | Facility: CLINIC | Age: 46
End: 2021-01-28

## 2021-01-28 NOTE — TELEPHONE ENCOUNTER
Called pt to confirm upcoming apt in 2 weeks on 2/11/21 with Dr Jimmie Fischer    Patient reports more  breakthrough migraines with head and neck pain    Asked pt if they are unable to keep apt or interested in virtual apt to please call office, also advised of no show fee  Advised we will call closer to day of apt for COVID screening

## 2021-02-08 ENCOUNTER — EVALUATION (OUTPATIENT)
Dept: PHYSICAL THERAPY | Facility: CLINIC | Age: 46
End: 2021-02-08
Payer: COMMERCIAL

## 2021-02-08 DIAGNOSIS — M67.813 OTHER SPECIFIED DISORDERS OF TENDON, RIGHT SHOULDER: Primary | ICD-10-CM

## 2021-02-08 PROCEDURE — 97112 NEUROMUSCULAR REEDUCATION: CPT | Performed by: PHYSICAL THERAPIST

## 2021-02-08 PROCEDURE — 97162 PT EVAL MOD COMPLEX 30 MIN: CPT | Performed by: PHYSICAL THERAPIST

## 2021-02-08 NOTE — LETTER
2021    Joselin FayDO casper  [facility]  [address] [place], [place] [id number] [facility] [address] [facility]    Patient: Brigido Stiles   YOB: 1975   Date of Visit: 2021     Encounter Diagnosis     ICD-10-CM    1  Other specified disorders of tendon, right shoulder  M67 813        Dear Dr He Eaton: Thank you for your recent referral of Brigido Stiles  Please review the attached evaluation summary from Dolores's recent visit  Please verify that you agree with the plan of care by signing the attached order  If you have any questions or concerns, please do not hesitate to call  I sincerely appreciate the opportunity to share in the care of one of your patients and hope to have another opportunity to work with you in the near future  Sincerely,    Daphney Lazo, PT      Referring Provider:      I certify that I have read the below Plan of Care and certify the need for these services furnished under this plan of treatment while under my care  Joselin Fay   [address] [employer]  [address] [place], [place] [id number] [place] [address] [address] [facility]  Via Fax: [phone]          PT Evaluation     Today's date: 2021  Patient name: Brigido Stiles  : 1975  MRN: [id number]  Referring provider: Jose Oseguera DO  Dx:   Encounter Diagnosis     ICD-10-CM    1  Other specified disorders of tendon, right shoulder  M67 813                   Assessment  Assessment details: 41year old female patient reports to PT with chronic R shoulder pain  No red flags noted  Patient has extensive history of neck pain and headaches with R hand numbness/tingling, which is unchanged with R shoulder pain  Patient presents with R shoulder decreased mobility into IR ROM especially as well as decreased R shoulder and scapular strength  Patient will benefit from skilled PT services to address current impairments and functional limitations to help patient return to her PLOF         Impairments: abnormal movement, activity intolerance, impaired physical strength and pain with function    Symptom irritability: moderateUnderstanding of Dx/Px/POC: good   Prognosis: good    Goals  STG  1  Patient will be independent with completion of HEP throughout therapy  2  Patient will have at worst 6/10 pain so patient can sleep with less discomfort in 3 weeks  3  Patient will increase R IR shoulder ROM to at least 60 deg in 3 weeks  LTG  1  Patient will increase R shoulder and scapular muscle strength by at least 1/2 grade so patient can lift with less difficulty in 6 weeks  2  Patient will dress herself without increase in symptoms in 6 weeks  Plan  Patient would benefit from: skilled physical therapy  Planned therapy interventions: joint mobilization, manual therapy, neuromuscular re-education, patient education, strengthening, stretching, therapeutic activities, therapeutic exercise, home exercise program, flexibility and functional ROM exercises  Frequency: 2x week  Duration in weeks: 6  Treatment plan discussed with: patient        Subjective Evaluation    History of Present Illness  Mechanism of injury: Patient reports with R shoulder pain that has been going on for several years but has progressively gotten worse  Patient notes right before [place] she noticed pain in her R shoulder when she woke up  Patient then notes at [place] she couldn't move her arm, so went to [facility] and the MD gave her CSI into her shoulder which has helped a lot but she knows the effects are wearing off already  Patient has difficulty working out and doing upper body work, especially overhead movement and push ups  Patient denies numbness/tingling except for her normal numbness/tingling from her neck  Patient has difficulty dressing herself as well as cleaning her house as well  Patient also has neck pain who she sees a neurologist  She also gets headaches for which she gets injections for and is on medication for her symptoms   Patient notes her headaches used to be very severe  Patient notes gets numbness/tingling down her fingers from her neck      Pain  Current pain rating: 3  At best pain ratin  At worst pain ratin  Quality: dull ache, sharp, throbbing and radiating  Relieving factors: change in position  Aggravating factors: overhead activity and lifting    Treatments  Previous treatment: injection treatment and physical therapy  Current treatment: physical therapy  Patient Goals  Patient goals for therapy: decreased pain, increased strength and return to sport/leisure activities          Objective     Passive Range of Motion   Left Shoulder   Flexion: WFL  Abduction: WFL  External rotation 90°: 120 degrees   Internal rotation 90°: 80 degrees     Right Shoulder   Flexion: WFL  Abduction: WFL  External rotation 90°: 90 degrees with pain  Internal rotation 90°: 45 degrees with pain    Strength/Myotome Testing     Left Shoulder     Planes of Motion   Flexion: 4   Abduction: 4   External rotation at 0°: 4   Internal rotation at 0°: 4     Isolated Muscles   Lower trapezius: 4-   Middle trapezius: 4-   Serratus anterior: 4-   Subscapularis: 4-     Right Shoulder     Planes of Motion   Flexion: 4-   Abduction: 4-   External rotation at 0°: 4-   Internal rotation at 0°: 4- (pain)     Isolated Muscles   Lower trapezius: 3+   Middle trapezius: 3+   Serratus anterior: 3+              Precautions: migraines, GERD, "reactive arthritis"      Manuals 2/8            Shoulder PROm (IR primarily then ER)                          SOR/cervical Myofascial rls R side             Thoracic mobs             Upper cervical mobs                                       Neuro Re-Ed             Supine serratus punches reviewed            Ball on wall             Serratus wall slides                                                                 Ther Ex             Supine shoulder posterior capsule stretch reviewed            sidelying shoulder ER reviewed            Prone low trap retraction reviewed UBE             Shoulder ER MRE 90/90                                        Ther Activity                                       Gait Training                                       Modalities

## 2021-02-08 NOTE — PROGRESS NOTES
PT Evaluation     Today's date: 2021  Patient name: Toi Trevino  : 1975  MRN: [id number]  Referring provider: Anay Fischer DO  Dx:   Encounter Diagnosis     ICD-10-CM    1  Other specified disorders of tendon, right shoulder  M67 813                   Assessment  Assessment details: 41year old female patient reports to PT with chronic R shoulder pain  No red flags noted  Patient has extensive history of neck pain and headaches with R hand numbness/tingling, which is unchanged with R shoulder pain  Patient presents with R shoulder decreased mobility into IR ROM especially as well as decreased R shoulder and scapular strength  Patient will benefit from skilled PT services to address current impairments and functional limitations to help patient return to her PLOF  Impairments: abnormal movement, activity intolerance, impaired physical strength and pain with function    Symptom irritability: moderateUnderstanding of Dx/Px/POC: good   Prognosis: good    Goals  STG  1  Patient will be independent with completion of HEP throughout therapy  2  Patient will have at worst 6/10 pain so patient can sleep with less discomfort in 3 weeks  3  Patient will increase R IR shoulder ROM to at least 60 deg in 3 weeks  LTG  1  Patient will increase R shoulder and scapular muscle strength by at least 1/2 grade so patient can lift with less difficulty in 6 weeks  2  Patient will dress herself without increase in symptoms in 6 weeks       Plan  Patient would benefit from: skilled physical therapy  Planned therapy interventions: joint mobilization, manual therapy, neuromuscular re-education, patient education, strengthening, stretching, therapeutic activities, therapeutic exercise, home exercise program, flexibility and functional ROM exercises  Frequency: 2x week  Duration in weeks: 6  Treatment plan discussed with: patient        Subjective Evaluation    History of Present Illness  Mechanism of injury: Patient reports with R shoulder pain that has been going on for several years but has progressively gotten worse  Patient notes right before [place] she noticed pain in her R shoulder when she woke up  Patient then notes at [place] she couldn't move her arm, so went to [facility] and the MD gave her CSI into her shoulder which has helped a lot but she knows the effects are wearing off already  Patient has difficulty working out and doing upper body work, especially overhead movement and push ups  Patient denies numbness/tingling except for her normal numbness/tingling from her neck  Patient has difficulty dressing herself as well as cleaning her house as well  Patient also has neck pain who she sees a neurologist  She also gets headaches for which she gets injections for and is on medication for her symptoms  Patient notes her headaches used to be very severe  Patient notes gets numbness/tingling down her fingers from her neck      Pain  Current pain rating: 3  At best pain ratin  At worst pain ratin  Quality: dull ache, sharp, throbbing and radiating  Relieving factors: change in position  Aggravating factors: overhead activity and lifting    Treatments  Previous treatment: injection treatment and physical therapy  Current treatment: physical therapy  Patient Goals  Patient goals for therapy: decreased pain, increased strength and return to sport/leisure activities          Objective     Passive Range of Motion   Left Shoulder   Flexion: WFL  Abduction: WFL  External rotation 90°: 120 degrees   Internal rotation 90°: 80 degrees     Right Shoulder   Flexion: WFL  Abduction: WFL  External rotation 90°: 90 degrees with pain  Internal rotation 90°: 45 degrees with pain    Strength/Myotome Testing     Left Shoulder     Planes of Motion   Flexion: 4   Abduction: 4   External rotation at 0°: 4   Internal rotation at 0°: 4     Isolated Muscles   Lower trapezius: 4-   Middle trapezius: 4-   Serratus anterior: 4- Subscapularis: 4-     Right Shoulder     Planes of Motion   Flexion: 4-   Abduction: 4-   External rotation at 0°: 4-   Internal rotation at 0°: 4- (pain)     Isolated Muscles   Lower trapezius: 3+   Middle trapezius: 3+   Serratus anterior: 3+              Precautions: migraines, GERD, "reactive arthritis"      Manuals 2/8            Shoulder PROm (IR primarily then ER)                          SOR/cervical Myofascial rls R side             Thoracic mobs             Upper cervical mobs                                       Neuro Re-Ed             Supine serratus punches reviewed            Ball on wall             Serratus wall slides                                                                 Ther Ex             Supine shoulder posterior capsule stretch reviewed            sidelying shoulder ER reviewed            Prone low trap retraction reviewed                         UBE             Shoulder ER MRE 90/90                                        Ther Activity                                       Gait Training                                       Modalities

## 2021-02-09 ENCOUNTER — TRANSCRIBE ORDERS (OUTPATIENT)
Dept: PHYSICAL THERAPY | Facility: CLINIC | Age: 46
End: 2021-02-09

## 2021-02-11 ENCOUNTER — PROCEDURE VISIT (OUTPATIENT)
Dept: NEUROLOGY | Facility: CLINIC | Age: 46
End: 2021-02-11
Payer: COMMERCIAL

## 2021-02-11 VITALS — SYSTOLIC BLOOD PRESSURE: 134 MMHG | DIASTOLIC BLOOD PRESSURE: 76 MMHG | TEMPERATURE: 97.8 F | HEART RATE: 77 BPM

## 2021-02-11 DIAGNOSIS — G24.3 CERVICAL DYSTONIA: Primary | ICD-10-CM

## 2021-02-11 PROCEDURE — 64616 CHEMODENERV MUSC NECK DYSTON: CPT | Performed by: PSYCHIATRY & NEUROLOGY

## 2021-02-11 NOTE — PROGRESS NOTES
Chemodenervation     Date/Time 2/11/2021 9:35 AM     Performed by  Gayathri Xavier MD     Authorized by Gayathri Xavier MD        Pre-procedure details      Preparation: Patient was prepped and draped in usual sterile fashion      Prepped With: Alcohol     Anesthesia  (see MAR for exact dosages): Anesthesia method:  None   Procedure details     Position:  Supine   Botox     Botox Type:  Type A    Brand:  Botox    mL's of Botulinum Toxin:  200    Needle Gauge:  30 G 2 5 inch   Total Units     Total units used:  200    Total units discarded:  0   Post-procedure details      Chemodenervation:  Neck, excluding muscles of the larynx    Neck Muscle Location[de-identified]  Bilateral neck muscle    Patient tolerance of procedure:   Tolerated well, no immediate complications            L  injection amount:  5 unit(s)    R  injection amount:  5 unit(s)    Procerus injection amount: 5 unit(s)        Left orbicularis oculi - 5  units -     Right orbicularis oculi -  5 units -        L lateral frontalis:  5 unit(s)    R lateral frontalis:  5 unit(s)    L medial frontalis:  5 unit(s) more spread out    R medial frontalis: 5 unit(s) more spread out       L temporalis injection amount:  20 unit(s)    R temporalis injection amount:  20 unit(s)        L inferior occipitalis injection amount:  15 unit(s)    R inferior occipitalis injection amount:  15 unit(s)       L superior cervical paraspinal injection amount:  10 unit(s)    R superior cervical paraspinal injection amount:  10 unit(s)       L superior trapezius injection amount:  15 unit(s)    R superior trapezius injection amount:  15 unit(s)        left  parietal region injection amount:  10 units   Right parietal region injection amount:  10 units          L sternocleidomastoid injection amount:    10 unit(s)        R sternocleidomastoid injection amount:   5 unit(s)           Total units used:  200 units  Total units wasted:  0 units     Post-procedure details:   Patient tolerance of procedure:  Tolerated well, no immediate complications

## 2021-02-16 ENCOUNTER — OFFICE VISIT (OUTPATIENT)
Dept: PHYSICAL THERAPY | Facility: CLINIC | Age: 46
End: 2021-02-16
Payer: COMMERCIAL

## 2021-02-16 DIAGNOSIS — M67.813 OTHER SPECIFIED DISORDERS OF TENDON, RIGHT SHOULDER: Primary | ICD-10-CM

## 2021-02-16 PROCEDURE — 97110 THERAPEUTIC EXERCISES: CPT | Performed by: PHYSICAL THERAPIST

## 2021-02-16 PROCEDURE — 97140 MANUAL THERAPY 1/> REGIONS: CPT | Performed by: PHYSICAL THERAPIST

## 2021-02-16 PROCEDURE — 97112 NEUROMUSCULAR REEDUCATION: CPT | Performed by: PHYSICAL THERAPIST

## 2021-02-16 NOTE — PROGRESS NOTES
Daily Note     Today's date: 2021  Patient name: Kameron Lal  : 1975  MRN: [id number]  Referring provider: Will Cardoso DO  Dx:   Encounter Diagnosis     ICD-10-CM    1  Other specified disorders of tendon, right shoulder  M67 813                   Subjective: Patient notes thinks her motion is improving and HEP is making her good sore  Objective: See treatment diary below      Assessment: Tolerated treatment well  Patient would benefit from continued PT  Patient able to tolerate all exercises without increase in symptoms  Seems to be progressing well with current POC  Plan: Progress treatment as tolerated         Precautions: migraines, GERD, "reactive arthritis"      Manuals            Shoulder PROm (IR primarily then ER)  [address]                        SOR/cervical Myofascial rls R side             Thoracic mobs             Upper cervical mobs                                       Neuro Re-Ed             Supine serratus punches reviewed 2x12 w/ pause 2 lbs            Ball on wall  20x 2 dir red           Serratus wall slides  2x6                                                                 Ther Ex             Supine shoulder posterior capsule stretch reviewed -           sidelying shoulder ER reviewed 2x12 1 lb            Prone low trap retraction reviewed 2x10 w/ pause                         UBE  4 min            Shoulder ER MRE 90/90   12x                                     Ther Activity                                       Gait Training                                       Modalities

## 2021-02-18 ENCOUNTER — APPOINTMENT (OUTPATIENT)
Dept: PHYSICAL THERAPY | Facility: CLINIC | Age: 46
End: 2021-02-18
Payer: COMMERCIAL

## 2021-02-22 ENCOUNTER — APPOINTMENT (OUTPATIENT)
Dept: PHYSICAL THERAPY | Facility: CLINIC | Age: 46
End: 2021-02-22
Payer: COMMERCIAL

## 2021-02-25 ENCOUNTER — OFFICE VISIT (OUTPATIENT)
Dept: PHYSICAL THERAPY | Facility: CLINIC | Age: 46
End: 2021-02-25
Payer: COMMERCIAL

## 2021-02-25 DIAGNOSIS — M67.813 OTHER SPECIFIED DISORDERS OF TENDON, RIGHT SHOULDER: Primary | ICD-10-CM

## 2021-02-25 PROCEDURE — 97140 MANUAL THERAPY 1/> REGIONS: CPT

## 2021-02-25 PROCEDURE — 97110 THERAPEUTIC EXERCISES: CPT

## 2021-02-25 NOTE — PROGRESS NOTES
Daily Note     Today's date: 2021  Patient name: Audrey Singleton  : 1975  MRN: [id number]  Referring provider: Leslee Goldstein DO  Dx:   Encounter Diagnosis     ICD-10-CM    1  Other specified disorders of tendon, right shoulder  M67 813                   Subjective: Pt denies HA upon arrival since receiving injections last week and notes minimal shoulder pain, often when she doesn't do her HEP  TTP R c-spine PSM and scalene  Objective: See treatment diary below     Precautions: migraines, GERD, "reactive arthritis"    Manuals           Shoulder PROM (IR primarily then ER)  [facility]                       SOR/cervical Myofascial rls R side   SH          Thoracic mobs             Upper cervical mobs                                       Neuro Re-Ed             Supine serratus punches reviewed 2x12 w/ pause 2 lbs  2# 5"x20          Ball on wall  20x 2 dir red 20x ea AP/ML  sm red          Serratus wall slides  2x6   2x8          TB b/l ER   OTB 5"x10                                                 Ther Ex             Supine shoulder posterior capsule stretch reviewed -           sidelying shoulder ER reviewed 2x12 1 lb  2# 2x10          Prone low trap retraction reviewed 2x10 w/ pause  5"x20                       UBE  4 min  2' ea          Shoulder ER MRE 90/90   12x                                                                                             Assessment: Tolerated treatment well  Patient demonstrated fatigue post treatment, exhibited good technique with therapeutic exercises and would benefit from continued PT  Plan: Continue per plan of care  Progress treatment as tolerated      Brittani Norris PTA

## 2021-03-01 ENCOUNTER — APPOINTMENT (OUTPATIENT)
Dept: PHYSICAL THERAPY | Facility: CLINIC | Age: 46
End: 2021-03-01
Payer: COMMERCIAL

## 2021-03-03 ENCOUNTER — OFFICE VISIT (OUTPATIENT)
Dept: PHYSICAL THERAPY | Facility: CLINIC | Age: 46
End: 2021-03-03
Payer: COMMERCIAL

## 2021-03-03 DIAGNOSIS — M67.813 OTHER SPECIFIED DISORDERS OF TENDON, RIGHT SHOULDER: Primary | ICD-10-CM

## 2021-03-03 PROCEDURE — 97110 THERAPEUTIC EXERCISES: CPT | Performed by: PHYSICAL THERAPIST

## 2021-03-03 PROCEDURE — 97140 MANUAL THERAPY 1/> REGIONS: CPT | Performed by: PHYSICAL THERAPIST

## 2021-03-03 PROCEDURE — 97112 NEUROMUSCULAR REEDUCATION: CPT | Performed by: PHYSICAL THERAPIST

## 2021-03-03 NOTE — PROGRESS NOTES
Daily Note     Today's date: 3/3/2021  Patient name: Audrey Singleton  : 1975  MRN: [id number]  Referring provider: Leslee Goldstein DO  Dx:   Encounter Diagnosis     ICD-10-CM    1  Other specified disorders of tendon, right shoulder  M67 813                   Subjective: Patient notes skied on Friday and shoulder felt good until next morning  Patient thinks it happened when patient had to pull her daughter with her ski pole  Just notes residual soreness still today and tightness  Objective: See treatment diary below     Precautions: migraines, GERD, "reactive arthritis"    Manuals 2/8 2/16 2/25 3/3         Shoulder PROM (IR primarily then ER)  [address]                      SOR/cervical Myofascial rls R side   SH          Thoracic mobs             Upper cervical mobs                                       Neuro Re-Ed             Supine serratus punches reviewed 2x12 w/ pause 2 lbs  2# 5"x20 20x 5" 3 lbs          Ball on wall  20x 2 dir red 20x ea AP/ML  sm red 20x          Serratus wall slides  2x6   2x8 2x8 w/ orange band         TB b/l ER   OTB 5"x10          Push up plus     10x 5" holds                                    Ther Ex             Supine shoulder posterior capsule stretch reviewed -           sidelying shoulder ER reviewed 2x12 1 lb  2# 2x10 3x10 2 lbs          Prone low trap retraction reviewed 2x10 w/ pause  5"x20 20x 5" holds                       UBE  4 min  2' ea 4 min          Shoulder ER MRE 90/90   12x  2x12          Prone mid trap T's     2x10                                                                               Assessment: Tolerated treatment well  Patient would benefit from continued PT  Patient was more tight into IR this visit, but loosened up with manuals  No pain with additional scapular strengthening exercises  Plan: Continue per plan of care  Progress treatment as tolerated      Juan Silver, PT

## 2021-03-04 ENCOUNTER — APPOINTMENT (OUTPATIENT)
Dept: PHYSICAL THERAPY | Facility: CLINIC | Age: 46
End: 2021-03-04
Payer: COMMERCIAL

## 2021-03-10 ENCOUNTER — OFFICE VISIT (OUTPATIENT)
Dept: PHYSICAL THERAPY | Facility: CLINIC | Age: 46
End: 2021-03-10
Payer: COMMERCIAL

## 2021-03-10 DIAGNOSIS — M67.813 OTHER SPECIFIED DISORDERS OF TENDON, RIGHT SHOULDER: Primary | ICD-10-CM

## 2021-03-10 PROCEDURE — 97112 NEUROMUSCULAR REEDUCATION: CPT | Performed by: PHYSICAL THERAPIST

## 2021-03-10 PROCEDURE — 97110 THERAPEUTIC EXERCISES: CPT | Performed by: PHYSICAL THERAPIST

## 2021-03-10 PROCEDURE — 97140 MANUAL THERAPY 1/> REGIONS: CPT | Performed by: PHYSICAL THERAPIST

## 2021-03-10 NOTE — PROGRESS NOTES
Daily Note     Today's date: 3/10/2021  Patient name: Ladonna Wang  : 1975  MRN: [id number]  Referring provider: Cody Escobedo DO  Dx:   Encounter Diagnosis     ICD-10-CM    1  Other specified disorders of tendon, right shoulder  M67 813        Start Time: 705          Subjective: Patient notes she still wakes up stiff in her R shoulder, but no issues over last week  Objective: See treatment diary below     Precautions: migraines, GERD, "reactive arthritis"    Manuals 2/8 2/16 2/25 3/3 3/10        Shoulder PROM (IR primarily then ER)   [address]                     SOR/cervical Myofascial rls R side   SH          Thoracic mobs             Upper cervical mobs             Median nerve glides     2x10 at wrist                      Neuro Re-Ed             Supine serratus punches reviewed 2x12 w/ pause 2 lbs  2# 5"x20 20x 5" 3 lbs  20x 5" holds 5 lbs         Ball on wall  20x 2 dir red 20x ea AP/ML  sm red 20x  20x 3 dir red ball         Serratus wall slides  2x6   2x8 2x8 w/ orange band 2x10 w/ red band         TB b/l ER   OTB 5"x10          Push up plus     10x 5" holds  12x 5" holds                                   Ther Ex             Supine shoulder posterior capsule stretch reviewed -           sidelying shoulder ER reviewed 2x12 1 lb  2# 2x10 3x10 2 lbs  3x10 3 lbs         Prone low trap retraction reviewed 2x10 w/ pause  5"x20 20x 5" holds  20x 5" holds                      UBE  4 min  2' ea 4 min  4 min         Shoulder ER MRE 90/90   12x  2x12  2x12         Prone mid trap T's     2x10  2x12                                                                             Assessment: Tolerated treatment well  Patient would benefit from continued PT  Patient wasn't as tight into IR this visit, but loosened up with manuals  No pain with additional scapular strengthening exercises  Plan: Continue per plan of care  Progress treatment as tolerated      Thao Collins, PT

## 2021-03-14 ENCOUNTER — AMB VIDEO VISIT (OUTPATIENT)
Dept: OTHER | Facility: HOSPITAL | Age: 46
End: 2021-03-14

## 2021-03-14 ENCOUNTER — OFFICE VISIT (OUTPATIENT)
Dept: URGENT CARE | Facility: MEDICAL CENTER | Age: 46
End: 2021-03-14
Payer: COMMERCIAL

## 2021-03-14 VITALS — HEART RATE: 100 BPM | OXYGEN SATURATION: 100 % | RESPIRATION RATE: 18 BRPM | TEMPERATURE: 98.1 F

## 2021-03-14 DIAGNOSIS — B34.9 VIRAL SYNDROME: Primary | ICD-10-CM

## 2021-03-14 PROCEDURE — 99213 OFFICE O/P EST LOW 20 MIN: CPT | Performed by: PHYSICIAN ASSISTANT

## 2021-03-14 PROCEDURE — ECARE PR SL URGENT CARE VIRTUAL VISIT: Performed by: FAMILY MEDICINE

## 2021-03-14 PROCEDURE — 0241U HB NFCT DS VIR RESP RNA 4 TRGT: CPT | Performed by: PHYSICIAN ASSISTANT

## 2021-03-14 NOTE — PROGRESS NOTES
[facility]        NAME: Thiago Anda is a 41 y o  female  : 1975    MRN: [id number]  DATE: 2021  TIME: 10:36 AM    Pulse 100   Temp 98 1 °F (36 7 °C)   Resp 18   SpO2 100%     Assessment and Plan   Viral syndrome [B34 9]  1  Viral syndrome  Multiplex COVID19, Influenza A/B, RSV PCR, [facility] - Office Collection         Patient Instructions       Follow up with PCP in 3-5 days  Proceed to  ER if symptoms worsen  Chief Complaint     Chief Complaint   Patient presents with    Fever     Patient presents with fever up to 99 8, cough, and fatigue since Friday  She denies positive contact with covid  History of Present Illness       Pt feeling feverish and run down x 2 days  Positive covid test dec 28 2020   Pt sent to urgent care from [facility] for flu and covid testing  [email or web address] has already called in tamiflu rx for the pt       Review of Systems   Review of Systems   Constitutional: Positive for fatigue and fever  HENT: Negative  Eyes: Negative  Respiratory: Negative  Cardiovascular: Negative  Gastrointestinal: Negative  Endocrine: Negative  Genitourinary: Negative  Musculoskeletal: Positive for myalgias  Skin: Negative  Allergic/Immunologic: Negative  Neurological: Negative  Hematological: Negative  Psychiatric/Behavioral: Negative  All other systems reviewed and are negative          Current Medications       Current Outpatient Medications:     acetaminophen (TYLENOL) 500 mg tablet, Take 1,000 mg by mouth every 6 (six) hours as needed for mild pain, Disp: , Rfl:     Albuterol (VENTOLIN IN), Inhale, Disp: , Rfl:     amitriptyline (ELAVIL) 10 mg tablet, TAKE 2 TABLETS DAILY AT BEDTIME, Disp: 180 tablet, Rfl: 3    ascorbic acid (VITAMIN C) 250 mg tablet, Take 250 mg by mouth daily, Disp: , Rfl:     benzonatate (TESSALON PERLES) 100 mg capsule, Take 1 capsule (100 mg total) by mouth 3 (three) times a day as needed for cough, Disp: 20 capsule, Rfl: 0    bisacodyl (DULCOLAX) 5 mg EC tablet, Take 5 mg by mouth daily , Disp: , Rfl:     calcium carbonate-vitamin D (OSCAL-D) 500 mg-200 units per tablet, Take 2 tablets by mouth daily, Disp: , Rfl:     Ergocalciferol 2000 units CAPS, Take 2,000 Units by mouth daily, Disp: 30 capsule, Rfl: 0    LORazepam (ATIVAN) 0 5 mg tablet, Take 1 tablet (0 5 mg total) by mouth daily as needed for anxiety, Disp: 15 tablet, Rfl: 0    Magnesium 100 MG CAPS, Take 1 capsule by mouth daily , Disp: , Rfl:     methylprednisolone (MEDROL) 4 mg tablet, Take 4 mg by mouth 2 (two) times a day, Disp: , Rfl:     omeprazole (PriLOSEC) 40 MG capsule, Take 1 capsule (40 mg total) by mouth daily before breakfast, Disp: 90 capsule, Rfl: 1    OnabotulinumtoxinA (BOTOX IJ), Inject as directed For Migraines by neurology, Disp: , Rfl:     Probiotic Product (PROBIOTIC-10 PO), Take 1 tablet by mouth daily , Disp: , Rfl:     Triamcinolone Acetonide (NASACORT ALLERGY 24HR) 55 MCG/ACT AERO, into each nostril, Disp: , Rfl:     Turmeric, Curcuma Longa, (CURCUMIN) POWD, Take 1 tablet by mouth daily , Disp: , Rfl:     Zinc Sulfate (ZINC-220 PO), Take by mouth, Disp: , Rfl:     Current Allergies     Allergies as of 03/14/2021 - Reviewed 03/14/2021   Allergen Reaction Noted    Bactrim [sulfamethoxazole-trimethoprim] Swelling and Rash 04/28/2020    Other  09/10/2015    Pollen extract Headache 04/01/2014            The following portions of the patient's history were reviewed and updated as appropriate: allergies, current medications, past family history, past medical history, past social history, past surgical history and problem list      Past Medical History:   Diagnosis Date    Anal fissure     Batres's esophagus without dysplasia 5/20/2020    Family history of colon cancer in mother 10/19/2018    Gastritis 1/8/2020    Gastroesophageal reflux disease 5/20/2020    Generalized anxiety disorder 9/10/2018    Migraine     Migraine without aura and without status migrainosus, not intractable 5/17/2019       Past Surgical History:   Procedure Laterality Date    COLONOSCOPY  07/2019    HEMORRHOID SURGERY      MA REMOVAL ANAL FISTULA,SUBCUTANEOUS N/A 3/26/2020    Procedure: FISTULOTOMY;  Surgeon: Mani Montoya MD;  [facility];  Service: Colorectal    MA SURG DIAGNOSTIC EXAM, ANORECTAL N/A 3/26/2020    Procedure: EXAM UNDER ANESTHESIA (EUA), ENDOANAL ULTRASOUND;  Surgeon: Mani Montoya MD;  [facility];  Service: Colorectal    UPPER GASTROINTESTINAL ENDOSCOPY  07/2019    VARICOSE VEIN SURGERY         Family History   Problem Relation Age of Onset    Colon cancer Mother     Coronary artery disease Maternal Grandmother     Coronary artery disease Paternal Grandmother     Hypertension Family     Colon cancer Maternal Grandfather          Medications have been verified  Objective   Pulse 100   Temp 98 1 °F (36 7 °C)   Resp 18   SpO2 100%        Physical Exam     Physical Exam  Vitals signs and nursing note reviewed  Constitutional:       Appearance: Normal appearance  She is normal weight  HENT:      Head: Normocephalic and atraumatic  Right Ear: Tympanic membrane, ear canal and external ear normal       Left Ear: Tympanic membrane, ear canal and external ear normal       Nose: Nose normal       Mouth/Throat:      Mouth: Mucous membranes are moist       Pharynx: Oropharynx is clear  Eyes:      Extraocular Movements: Extraocular movements intact  Conjunctiva/sclera: Conjunctivae normal       Pupils: Pupils are equal, round, and reactive to light  Neck:      Musculoskeletal: Normal range of motion and neck supple  Cardiovascular:      Rate and Rhythm: Normal rate and regular rhythm  Pulses: Normal pulses  Heart sounds: Normal heart sounds  Pulmonary:      Effort: Pulmonary effort is normal       Breath sounds: Normal breath sounds     Abdominal:      General: Abdomen is flat  Bowel sounds are normal       Palpations: Abdomen is soft  Musculoskeletal: Normal range of motion  Skin:     General: Skin is warm  Capillary Refill: Capillary refill takes less than 2 seconds  Neurological:      General: No focal deficit present  Mental Status: She is alert and oriented to person, place, and time     Psychiatric:         Mood and Affect: Mood normal

## 2021-03-14 NOTE — PATIENT INSTRUCTIONS
Viral Syndrome   WHAT YOU NEED TO KNOW:   Viral syndrome is a term used for symptoms of an infection caused by a virus  Viruses are spread easily from person to person through the air and on shared items  DISCHARGE INSTRUCTIONS:   Call your local emergency number (911 in the [address] [facility]) or have someone else call if:   · You have a seizure  · You cannot be woken  · You have chest pain or trouble breathing  Return to the emergency department if:   · You have a stiff neck, a bad headache, and sensitivity to light  · You feel weak, dizzy, or confused  · You stop urinating or urinate a lot less than usual     · You cough up blood or thick yellow or green mucus  · You have severe abdominal pain or your abdomen is larger than usual     Call your doctor if:   · Your symptoms do not get better with treatment or get worse after 3 days  · You have a rash or ear pain  · You have burning when you urinate  · You have questions or concerns about your condition or care  Medicines: You may  need any of the following:  · Acetaminophen  decreases pain and fever  It is available without a doctor's order  Ask how much to take and how often to take it  Follow directions  Read the labels of all other medicines you are using to see if they also contain acetaminophen, or ask your doctor or pharmacist  Acetaminophen can cause liver damage if not taken correctly  Do not use more than 4 grams (4,000 milligrams) total of acetaminophen in one day  · NSAIDs , such as ibuprofen, help decrease swelling, pain, and fever  NSAIDs can cause stomach bleeding or kidney problems in certain people  If you take blood thinner medicine, always ask your healthcare provider if NSAIDs are safe for you  Always read the medicine label and follow directions  · Cold medicine  helps decrease swelling, control a cough, and relieve chest or nasal congestion  · Saline nasal spray  helps decrease nasal congestion       · Take your medicine as directed  Contact your healthcare provider if you think your medicine is not helping or if you have side effects  Tell him of her if you are allergic to any medicine  Keep a list of the medicines, vitamins, and herbs you take  Include the amounts, and when and why you take them  Bring the list or the pill bottles to follow-up visits  Carry your medicine list with you in case of an emergency  Manage your symptoms:   · Drink liquids as directed to prevent dehydration  Ask how much liquid to drink each day and which liquids are best for you  Ask if you should drink an oral rehydration solution (ORS)  An ORS has the right amounts of water, salts, and sugar you need to replace body fluids  This may help prevent dehydration caused by vomiting or diarrhea  Do not drink liquids with caffeine  Liquids with caffeine can make dehydration worse  · Get plenty of rest to help your body heal   Take naps throughout the day  Ask your healthcare provider when you can return to work and your normal activities  · Use a cool mist humidifier to help you breathe easier  Ask your healthcare provider how to use a cool mist humidifier  · Eat honey or use cough drops for a sore throat  Cough drops are available without a doctor's order  Follow directions for taking cough drops  · Do not smoke or be close to anyone who is smoking  Nicotine and other chemicals in cigarettes and cigars can cause lung damage  Smoking can also delay healing  Ask your healthcare provider for information if you currently smoke and need help to quit  E-cigarettes or smokeless tobacco still contain nicotine  Talk to your healthcare provider before you use these products  Prevent the spread of germs:       · Wash your hands often  Wash your hands several times each day  Wash after you use the bathroom, change a child's diaper, and before you prepare or eat food  Use soap and water every time   Rub your soapy hands together, lacing your fingers  Wash the front and back of your hands, and in between your fingers  Use the fingers of one hand to scrub under the fingernails of the other hand  Wash for at least 20 seconds  Rinse with warm, running water for several seconds  Then dry your hands with a clean towel or paper towel  Use hand  that contains alcohol if soap and water are not available  Do not touch your eyes, nose, or mouth without washing your hands first          · Cover a sneeze or cough  Use a tissue that covers your mouth and nose  Throw the tissue away in a trash can right away  Use the bend of your arm if a tissue is not available  Wash your hands well with soap and water or use a hand   · Stay away from others while you are sick  Avoid crowds as much as possible  · Ask about vaccines you may need  Talk to your healthcare provider about your vaccine history  He or she will tell you which vaccines you need, and when to get them  ? Get the influenza (flu) vaccine as soon as recommended each year  The flu vaccine is available starting in September or October  Flu viruses change, so it is important to get a flu vaccine every year  ? Get the pneumonia vaccine if recommended  This vaccine is usually recommended every 5 years  Your provider will tell you when to get this vaccine, if needed  Follow up with your doctor as directed:  Write down your questions so you remember to ask them during your visits  © Copyright [address] Information is for End User's use only and may not be sold, redistributed or otherwise used for commercial purposes  All illustrations and images included in CareNotes® are the copyrighted property of A D A M , Inc  or [facility] Mana Loomis   The above information is an  only  It is not intended as medical advice for individual conditions or treatments   Talk to your doctor, nurse or pharmacist before following any medical regimen to see if it is safe and effective for you

## 2021-03-14 NOTE — CARE ANYWHERE EVISITS
Visit Summary for Tanya TEJEDA - Gender: Female - Date of Birth: [id number]: [id number] - Duration: 13 minutesPatient: ETHAN TEJEDAProvider: Elsa Castillo MyersPatient Contact [id number] [employer] [facility] [place] [address]Qvetm TopicsFlu like   symptoms  [Added By: Self - 2021-03-14]Triage Questions What is your current physical address in the event of a medical emergency? Answer []  Are you allergic to any medications? Answer []  Are you now or could you be pregnant? Answer []    Do you have any immune system compromise or chronic lung disease? Answer []  Do you have any vulnerable family members in the home (infant, pregnant, cancer, elderly)? Answer [] [facility] [Notification] You are connected   with Livier Mccormick, Family Physician [0A][Notification] Elba Loyd is located in [place]  [0A][Notification] Elba Loyd has shared health history  Simeon Caldera  [0A][Notification] Livier Mccormick has added a prescription [0A][Notification] Livier Mccormick has   added an in person visit  [0A][Notification] Livier Mccormick has added a diagnosis/procedure code  [0A][Notification] Livier Mccormick has added a diagnosis/procedure code  [0A]DiagnosisViral infection, unspecifiedProceduresValue: 46878 Code: CPT-4 ONLINE   E/M BY PHYS/QHPMedications PrescribedTamifluDose : 1 capsuleRoute : oralFrequency : 2 times a day  Refills : 0Instructions to the Pharmacist : Substitutions allowedProvider Notes[0A][0A] Reason for Consultation:  Patient with complaint of  fever, chills,   body aches, joint aches and congestion x 2 days  She had COVID 19 in December, 2020  She hasn't traveled, but has been out locally  [0A]Risk factors: Autoimmune disease[0A]PMH:  Headaches, GERD, reactive arthritis[0A]Meds:  Amitriptyline and   Omeprazole[0A]Medication Allergies: Sulfa[0A]Pregnant/Nursing: Denies[0A]Exam: Alert, normal mental status and interaction, no visible[0A]distress, non- toxic appearance   [0A]Assessment: Flu like symptoms, COVID like symptoms  Screening for [address]  60[0A]Plan: Referral added  See the treatment below, which is[0A]necessary per standard of care[0A]Identifying Lab[0A]Location: [0A]You can locate swab testing in your area on the following[0A]site:   [0A][email or web address]/[0A]How to use the lab[0A]order: [0A]Download referral form from messages section of account and[0A]bring it to lab (no guarantee lab will accept document)  The following I[0A]sin the referral   form as lab order:  [0A]COVID Antigen Test Order[0A]Lab order for [address]  Test: SARS-Cov-2 Antigen Test[0A]2  Diagnosis Code: H45 81[4T]9  Date:   03/14/2021[0A]4   e-Signature:  Dr Sara Chapman Results:[0A]You   will receive results directly from the lab  [0A]Interpreting Results: [0A]Lab results[0A][0A]Positive test  These tests are not 100%[0A]perfect but if you tested positive for Covid-19, this simply confirms that[0A]Covid-19 is the likely cause of your   symptoms  You do not need to take further[0A]action unless you are experiencing worsening of your condition  You should[0A]follow all of the above guidance to avoid infecting others around you and[0A]should follow carefully the section called   âDiscontinuing Home Self-Isolationâ[0A]to determine when you are no longer contagious  [0A][0A]Negative test  This confirms that Covid-19[0A]is not likely the cause of your symptoms  You probably are infected with[0A]another routine respiratory virus  These tests are not 100% perfect, though, so[0A]there is still a small chance that Covid 19 is the cause of your symptoms  You[0A]can avoid strict Isolation, unless there are very high risk patients in the[0A]home (for example- chronic disease, elderly,   infants), but you should still[0A]take precautions to prevent spread to others around you[0A]Discussed  precautions   Patient voiced understanding and[0A]agrees to plan  [0A]Follow up:[0A]1)     If you were prescribed[0A]medication and there are any   questions or problems with the prescription, call[0A][phone] anytime for assistance  [0A]2)     Please re-connect for another[0A]online visit or see an in-person provider should your symptoms worsen or[0A]persist or see your primary care physician     [0A]3)     Taking Vitamin C 1000 mg daily,[0A]Zinc and Tylenol products for symptoms  Drink warm liquids and stay[0A]hydrated with at least 64 ounces of water and get 8-10 hours of sleep  [0A]Walk for exercise as tolerated, it can help the immune   system as[0A]well   [0A]4)     Please print a copy of this note[0A]and send it to your regular doctor, or take it to your next visit so it may be[0A]included in your medical record  [0A]Diagnosis and Plan[0A][0A]You have been diagnosed with a viral   respiratory infection that may be[0A]due to coronavirus (Covid-19)  At this time your provider has determined[0A]that you do not require an emergency evaluation  If your symptoms progress or[0A]worsen, it may be necessary to seek additional care in   person  In the [facility] provider recommends that you practice self-isolation  [0A]Additionally, your provider recommends that you follow good practices for infection[0A]prevention and control (IPC) as described below  [0A][0A][0A][0A]Home   Self-Isolation[0A]Stay at home except to[0A]receive medical care [address] [facility] from other people and animals in the home Call[0A]ahead before visiting any health facility [address] facemask if around others Cover[0A]your coughs and sneezes Clean   your[0A]hands often Clean âhigh touchâ[0A]surfaces every day[0A][0A][0A]Monitor your symptoms for worsening[0A][0A]Seek medical attention immediately if your illness is worsening (for example[0A]difficulty breathing, dizziness, dark colored urine)     Before seeking care, call[0A]ahead to the facility and tell them that you may have Covid-19  Put on a[0A]facemask (or bandana) before entering the facility [0A][0A][0A][0A]If you believe you are experiencing a medical emergency, call 911   immediately[0A]and notify the personnel that you may have Covid-19 [0A][0A][0A][0A]Discontinuing Home Self-Isolation[0A][0A]CDC recommendations for ending home isolation are as follows:[0A]You have had no fever[0A]for at least 72 hours (that is three   full days of no fever without the use[0A]medicine that reduces fevers) AND other symptoms have improved (for[0A]example, when your cough or shortness of breath have improved) AND at[0A]least 7 days have passed since your symptoms first   appeared[0A]Questions[0A][0A]If you have any general questions you should try your primary care provider or[0A]the local health department  If you would like to be rechecked by one of our[0A]providers, you may come on for a follow-up visit but usual fees   will apply  [0A]Electronically signed by: Steph Babin(NPI [id number])

## 2021-03-15 LAB
FLUAV RNA RESP QL NAA+PROBE: NEGATIVE
FLUBV RNA RESP QL NAA+PROBE: NEGATIVE
RSV RNA RESP QL NAA+PROBE: NEGATIVE
SARS-COV-2 RNA RESP QL NAA+PROBE: NEGATIVE

## 2021-03-17 ENCOUNTER — APPOINTMENT (OUTPATIENT)
Dept: PHYSICAL THERAPY | Facility: CLINIC | Age: 46
End: 2021-03-17
Payer: COMMERCIAL

## 2021-03-24 ENCOUNTER — OFFICE VISIT (OUTPATIENT)
Dept: PHYSICAL THERAPY | Facility: CLINIC | Age: 46
End: 2021-03-24
Payer: COMMERCIAL

## 2021-03-24 ENCOUNTER — TELEPHONE (OUTPATIENT)
Dept: NEUROLOGY | Facility: CLINIC | Age: 46
End: 2021-03-24

## 2021-03-24 DIAGNOSIS — M67.813 OTHER SPECIFIED DISORDERS OF TENDON, RIGHT SHOULDER: Primary | ICD-10-CM

## 2021-03-24 PROCEDURE — 97140 MANUAL THERAPY 1/> REGIONS: CPT | Performed by: PHYSICAL THERAPIST

## 2021-03-24 PROCEDURE — 97112 NEUROMUSCULAR REEDUCATION: CPT | Performed by: PHYSICAL THERAPIST

## 2021-03-24 PROCEDURE — 97110 THERAPEUTIC EXERCISES: CPT | Performed by: PHYSICAL THERAPIST

## 2021-03-24 NOTE — PROGRESS NOTES
Daily Note     Today's date: 3/24/2021  Patient name: Mike Lux  : 1975  MRN: [id number]  Referring provider: Nicole Mendoza DO  Dx:   Encounter Diagnosis     ICD-10-CM    1  Other specified disorders of tendon, right shoulder  M67 813        Start Time: 705          Subjective: Patient notes gets a little sore after doing too much but is feeling good  Objective: See treatment diary below     Precautions: migraines, GERD, "reactive arthritis"    Manuals 2/25 3/3 3/10 3/24       Shoulder PROM (IR primarily then ER) [address]                  SOR/cervical Myofascial rls R side SH          Thoracic mobs           Upper cervical mobs           Median nerve glides   2x10 at wrist  [address]                  Neuro Re-Ed           Supine serratus punches 2# 5"x20 20x 5" 3 lbs  20x 5" holds 5 lbs  20x 5" holds 5 lbs on vert foam half roll        Ball on wall 20x ea AP/ML  sm red 20x  20x 3 dir red ball  20x 2 dir ylw ball        Serratus wall slides 2x8 2x8 w/ orange band 2x10 w/ red band  2x10 w/ red band        TB b/l ER OTB 5"x10          Push up plus   10x 5" holds  12x 5" holds  15x, 10x  5" holds                              Ther Ex           Supine shoulder posterior capsule stretch           sidelying shoulder ER 2# 2x10 3x10 2 lbs  3x10 3 lbs  3x12 3 lbs        Prone low trap retraction 5"x20 20x 5" holds  20x 5" holds  -                  UBE 2' ea 4 min  4 min  4 min       Shoulder ER MRE 90/90   2x12  2x12  2x12       Prone mid trap T's   2x10  2x12 2x12                   Supine thoracic ext over horiz    1 min        Supine thoracic ext over vert w/ B UE movements    10 bear hugs/snow angels/       Prone y's    2x8                       Assessment: Tolerated treatment well  Patient would benefit from continued PT  Exercises progressed  Patient has some neural tension but relieved post manuals  Plan: Continue per plan of care  Progress treatment as tolerated      Jc Hager, PT

## 2021-03-24 NOTE — TELEPHONE ENCOUNTER
Type Date User Summary Attachment   General 03/24/2021 11:26 AM Lu Givens care coordination  -   Note    Botox- authorization #: [id number]- last visit- valid from 7/1/2020 until 6/30/2021   Please use [facility]      Thank you,     Jonah Dawson

## 2021-03-31 NOTE — TELEPHONE ENCOUNTER
[address]- spoke with [facility]- I advised her I was calling to initiate a refill request for the patient's Botox order  She advised me that the patient's Botox order is ready to be scheduled for delivery  Patient's consent is not on file  I attempted to reach out to the patient- but she did not answer  I advised her that I would like this resent to insurance verification for the month of April  She is going to send this order to be re-verified  Will do a status check in 2 days

## 2021-04-01 DIAGNOSIS — Z23 ENCOUNTER FOR IMMUNIZATION: ICD-10-CM

## 2021-04-02 NOTE — TELEPHONE ENCOUNTER
Called [facility] spoke with Glenroy, advised I was calling to check the status of patient's botox order  Glenroy states that patient's botox is ready to scheduled for delivery, she verified that patient's benefits have been verified for the month for the month of April, however patient's consent for shipment is needed  Attempted to call patient with Jennifer on the line but was unsuccessful, left message    If patient calls back please advise her botox is ready to scheduled for delivery from [facility] pharmacy patient needs to call Jennifer @ [phone] to provide consent for shipment

## 2021-04-02 NOTE — TELEPHONE ENCOUNTER
Received call from patient, I advised that her botox medication is ready to be scheduled for delivery from [facility] pharmacy  Advised patient she needs to call [facility] to provide her consent for shipment, provided patient with phone number for Mariann @ [phone], patient verbalized understanding and states she will call [employer] today to provide consent for shipment  Will call [facility] on Monday to see if we can coordinate delivery

## 2021-04-05 NOTE — TELEPHONE ENCOUNTER
Received a call from [address]- She advised she is calling to schedule the patient's Botox order  Patient's consent is on file  Botox to be delivered on Wednesday 4/7/2021 to the [facility] location via [facility] overnight- a signature will be required  Mendy,     Please await the patient's Botox order and document once it has arrived  Please let me know if you do not receive the patient's Botox order      Thank you,    Tamie Dawley

## 2021-04-07 ENCOUNTER — APPOINTMENT (OUTPATIENT)
Dept: PHYSICAL THERAPY | Facility: CLINIC | Age: 46
End: 2021-04-07
Payer: COMMERCIAL

## 2021-04-07 NOTE — TELEPHONE ENCOUNTER
Botox number of units: 200  Botox quantity: 1  Arrived at what location: [facility]  Lot number: [id number]  Expiration Date: 12/2023  Appt notes indicate correct medication: yes    Botox receipt document and stored in the fridge  Receipt scanned into pt's chart and emailed to Botox coordinators

## 2021-04-14 ENCOUNTER — OFFICE VISIT (OUTPATIENT)
Dept: PHYSICAL THERAPY | Facility: CLINIC | Age: 46
End: 2021-04-14
Payer: COMMERCIAL

## 2021-04-14 ENCOUNTER — IMMUNIZATIONS (OUTPATIENT)
Dept: FAMILY MEDICINE CLINIC | Facility: HOSPITAL | Age: 46
End: 2021-04-14

## 2021-04-14 DIAGNOSIS — M67.813 OTHER SPECIFIED DISORDERS OF TENDON, RIGHT SHOULDER: Primary | ICD-10-CM

## 2021-04-14 DIAGNOSIS — Z23 ENCOUNTER FOR IMMUNIZATION: Primary | ICD-10-CM

## 2021-04-14 PROCEDURE — 91300 SARS-COV-2 / COVID-19 MRNA VACCINE (PFIZER-BIONTECH) 30 MCG: CPT

## 2021-04-14 PROCEDURE — 97164 PT RE-EVAL EST PLAN CARE: CPT | Performed by: PHYSICAL THERAPIST

## 2021-04-14 PROCEDURE — 97110 THERAPEUTIC EXERCISES: CPT | Performed by: PHYSICAL THERAPIST

## 2021-04-14 PROCEDURE — 0001A SARS-COV-2 / COVID-19 MRNA VACCINE (PFIZER-BIONTECH) 30 MCG: CPT

## 2021-04-14 PROCEDURE — 97112 NEUROMUSCULAR REEDUCATION: CPT | Performed by: PHYSICAL THERAPIST

## 2021-04-14 NOTE — PROGRESS NOTES
Daily Note     Today's date: 2021  Patient name: Libby Ferro  : 1975  MRN: [id number]  Referring provider: Miky Bailey DO  Dx:   Encounter Diagnosis     ICD-10-CM    1  Other specified disorders of tendon, right shoulder  M67 813        Start Time: 0750          Subjective: Patient notes did yard work on Thursday and is feeling a little worse  still today but moreso her R posterior neck  Objective: See treatment diary below      Moderately limited R sidebending w/ pain  Minimally limited R rotation w/ pain   Minimally limited extension w/ pain     Precautions: migraines, GERD, "reactive arthritis"    Manuals 2/25 3/3 3/10 3/24 4/14                                                                                   Neuro Re-Ed                                                                             Sitting posure     reviewed      Ther Ex           Repeated movement exam     1898      Supine cervical ext w/ retraction off table     10x                                                                                                                         Assessment: Tolerated treatment well  Patient would benefit from continued PT  Patient had some relief post repeated retraction w/ extension supine off table  Treatment plan adjusted and HEP updated  Patient educated about "green and red lights" to follow with repeated movements and importance of posture  Plan: Continue per plan of care  Progress treatment as tolerated      Marques Gillespie, PT

## 2021-04-21 ENCOUNTER — OFFICE VISIT (OUTPATIENT)
Dept: PHYSICAL THERAPY | Facility: CLINIC | Age: 46
End: 2021-04-21
Payer: COMMERCIAL

## 2021-04-21 DIAGNOSIS — M67.813 OTHER SPECIFIED DISORDERS OF TENDON, RIGHT SHOULDER: Primary | ICD-10-CM

## 2021-04-21 PROCEDURE — 97140 MANUAL THERAPY 1/> REGIONS: CPT | Performed by: PHYSICAL THERAPIST

## 2021-04-21 PROCEDURE — 97112 NEUROMUSCULAR REEDUCATION: CPT | Performed by: PHYSICAL THERAPIST

## 2021-04-21 NOTE — PROGRESS NOTES
Daily Note     Today's date: 2021  Patient name: Maycol Clark  : 1975  MRN: [id number]  Referring provider: Donna Preciado DO  Dx:   Encounter Diagnosis     ICD-10-CM    1  Other specified disorders of tendon, right shoulder  M67 813        Start Time: 730          Subjective: Patient notes noticing improvement as did a lot of yard work over the weekend and didn't have as much pain  Notes still has difficulty sidebending her head to the right  Objective: See treatment diary below      Moderately limited R sidebending w/ pain  Minimally limited R rotation w/ pain   Minimally limited extension w/ pain     Precautions: migraines, GERD, "reactive arthritis"    Manuals 3/24 4/14 4/21     Manual supine retraction w/ extension off table   [address]                                                     Neuro Re-Ed                                                        Sitting posure  reviewed reviewed     Ther Ex        Repeated movement exam  [address]     Supine cervical ext w/ retraction off table  10x  10x, 10x w/ wiggle                                                                                          Assessment: Tolerated treatment well  Patient would benefit from continued PT  Patient responding well to current POC but still slightly limited, so progression of force incorporated to include patient overpressure with wiggle at end of supine retraction w/ extension off table and then clinician traction as well  Patient educated about "green and red lights" to follow with repeated movements and importance of posture  Plan: Continue per plan of care  Progress treatment as tolerated      Gracy Joseph PT
Diagnostic testing not indicated for today's encounter

## 2021-04-28 ENCOUNTER — TELEPHONE (OUTPATIENT)
Dept: ADMINISTRATIVE | Facility: OTHER | Age: 46
End: 2021-04-28

## 2021-04-28 ENCOUNTER — APPOINTMENT (OUTPATIENT)
Dept: PHYSICAL THERAPY | Facility: CLINIC | Age: 46
End: 2021-04-28
Payer: COMMERCIAL

## 2021-04-28 NOTE — TELEPHONE ENCOUNTER
----- Message from Zoya Carrion sent at 4/27/2021  4:51 PM EDT -----  Regarding: Katty Scale request  04/27/21 4:51 PM    Hello, our patient Bettejane Barthel has had a mammogram completed/performed  Please assist in updating the patient chart by looking in care everywhere   The date of service is 1/19/2021    Thank you,  Bassam Ocampo MA   [facility]

## 2021-04-28 NOTE — TELEPHONE ENCOUNTER
Upon review of the In Basket request we were able to locate, review, and update the patient chart as requested for Mammogram     Any additional questions or concerns should be emailed to the Practice Liaisons via [email or web address] email, please do not reply via In Basket      Thank you  Kelvin Summers

## 2021-04-29 ENCOUNTER — OFFICE VISIT (OUTPATIENT)
Dept: FAMILY MEDICINE CLINIC | Facility: CLINIC | Age: 46
End: 2021-04-29
Payer: COMMERCIAL

## 2021-04-29 VITALS
HEIGHT: 60 IN | DIASTOLIC BLOOD PRESSURE: 68 MMHG | TEMPERATURE: 97 F | WEIGHT: 122.2 LBS | HEART RATE: 80 BPM | RESPIRATION RATE: 16 BRPM | BODY MASS INDEX: 23.99 KG/M2 | SYSTOLIC BLOOD PRESSURE: 124 MMHG

## 2021-04-29 DIAGNOSIS — G24.3 CERVICAL DYSTONIA: ICD-10-CM

## 2021-04-29 DIAGNOSIS — M02.30 REACTIVE ARTHRITIS (HCC): ICD-10-CM

## 2021-04-29 DIAGNOSIS — G43.009 MIGRAINE WITHOUT AURA AND WITHOUT STATUS MIGRAINOSUS, NOT INTRACTABLE: ICD-10-CM

## 2021-04-29 DIAGNOSIS — K21.9 GASTROESOPHAGEAL REFLUX DISEASE, UNSPECIFIED WHETHER ESOPHAGITIS PRESENT: ICD-10-CM

## 2021-04-29 DIAGNOSIS — Z00.00 HEALTH MAINTENANCE EXAMINATION: Primary | ICD-10-CM

## 2021-04-29 PROCEDURE — 3008F BODY MASS INDEX DOCD: CPT | Performed by: FAMILY MEDICINE

## 2021-04-29 PROCEDURE — 99214 OFFICE O/P EST MOD 30 MIN: CPT | Performed by: FAMILY MEDICINE

## 2021-04-29 PROCEDURE — 99396 PREV VISIT EST AGE 40-64: CPT | Performed by: FAMILY MEDICINE

## 2021-04-29 PROCEDURE — 1036F TOBACCO NON-USER: CPT | Performed by: FAMILY MEDICINE

## 2021-04-29 RX ORDER — MELOXICAM 15 MG/1
15 TABLET ORAL DAILY
Qty: 30 TABLET | Refills: 5
Start: 2021-04-29

## 2021-04-29 RX ORDER — IVERMECTIN 10 MG/G
CREAM TOPICAL AS NEEDED
COMMUNITY
Start: 2021-01-01

## 2021-04-29 NOTE — PROGRESS NOTES
Chief Complaint   Patient presents with    Physical Exam     lingering covid side effects    Fatigue    Neck Pain        HPI   Here for annual physical exam     Past medical history significant for Batres's esophagus on omeprazole  40 mg daily  Has cervical dystonia for which she gets Botox injections in her neck  Migraines now occur about twice a month  Last for about 1-2 days  Much better since getting Botox injections and on 20 mg of amitriptyline  Did not have a response to Triptan medications  Other problem is anxiety  Uses lorazepam as needed which is rare     Does see a therapist   Much of anxiety was related to her migraine headaches which were occurring on a daily basis for about 4 months  In 2019  Bothered by day-to-day pain in her neck and also her right shoulder  Insurance allows her 4 visits of physical therapy  Seen at [facility] last December  Given a shot of cortisone which has worn off  Limits ibuprofen because of stomach  Was diagnosed by Dr Tito Pimentel with reactive arthritis  Blood workup was negative  She gets flares which involve her ankles, knees, and wrists  During that time, she feels like she has the flu  States that she feels like she was hit by a truck  The symptoms will last for about a week  Ankles hurt when she wakes up in the morning  Hard to walk  Had Covid in December  Since Covid, she gets episodes where she feels short of breath  Note that she went for a hike and was quite short of breath  Sometimes, palpitations  Office note from Dr Tito Pimentel from 10/09/2020 reviewed          Past Medical History:   Diagnosis Date    Allergic rhinitis 4/7/2015    Anal fissure     Batres's esophagus without dysplasia 5/20/2020    Family history of colon cancer in mother 10/19/2018    Gastritis 1/8/2020    Gastroesophageal reflux disease 5/20/2020    Generalized anxiety disorder 9/10/2018    History of COVID-19 12/2020    Migraine without aura and without status migrainosus, not intractable 2019    Reactive arthritis ([facility] [address] ) 2021        Past Surgical History:   Procedure Laterality Date    COLONOSCOPY  2019    HEMORRHOID SURGERY      OK REMOVAL ANAL FISTULA,SUBCUTANEOUS N/A 3/26/2020    Procedure: FISTULOTOMY;  Surgeon: Junior Conrad MD;  [facility];  Service: Colorectal    OK SURG DIAGNOSTIC EXAM, ANORECTAL N/A 3/26/2020    Procedure: EXAM UNDER ANESTHESIA (EUA), ENDOANAL ULTRASOUND;  Surgeon: Junior Conrad MD;  [facility];  Service: Colorectal    UPPER GASTROINTESTINAL ENDOSCOPY  2019    VARICOSE VEIN SURGERY         Social History     Tobacco Use    Smoking status: Former Smoker     Quit date:      Years since quittin 3    Smokeless tobacco: Never Used   Substance Use Topics    Alcohol use: Yes     Comment: Socially        Social History     Social History Narrative     since   Two children  14 and 10  Works in communications for [employer]   is a  for [employer]  16 yo with an eating disorder which started in spring 2020  5/21- completed an intensive out-patient program thru [facility]  The following portions of the patient's history were reviewed and updated as appropriate: allergies, current medications, past family history, past medical history, past social history, past surgical history and problem list       Review of Systems   Constitutional: Negative for activity change and appetite change  HENT: Negative for ear pain and hearing loss  Eyes: Negative for visual disturbance  Respiratory: Negative for shortness of breath and wheezing  Cardiovascular: Negative for chest pain and leg swelling  Gastrointestinal: Negative for abdominal pain, constipation and diarrhea  Genitourinary: Negative for difficulty urinating  Musculoskeletal: Positive for arthralgias and neck pain  Negative for back pain  Skin: Negative for rash  Neurological: Negative for headaches  Psychiatric/Behavioral: Negative for dysphoric mood  The patient is not nervous/anxious  /68   Pulse 80   Temp (!) 97 °F (36 1 °C) (Temporal)   Resp 16   Ht 5' (1 524 m)   Wt 55 4 kg (122 lb 3 2 oz)   LMP 04/19/2021 (Approximate)   BMI 23 87 kg/m²      Physical Exam     Healthy appearing individual in no acute distress  Extraocular motions are intact  Both ear drums are white  Hearing is grossly intact  Throat reveals no erythema  Teeth are in good repair  No neck nodes or thyromegaly  Lungs are clear  Heart regular with no murmurs or gallops  Abdomen is soft and nontender  No leg edema  Skin reveals no apparent rash  Neurologic grossly within normal limits  Normal mood and affect  Musculoskeletal exam grossly within normal limits  No obvious swelling noted of hands, wrists, knees or ankles  Abduction of the right shoulder causes some discomfort after 150°                   Current Outpatient Medications:     Ivermectin (Soolantra) 1 % CREA, Apply topically daily, Disp: , Rfl:     amitriptyline (ELAVIL) 10 mg tablet, TAKE 2 TABLETS DAILY AT BEDTIME, Disp: 180 tablet, Rfl: 3    Diclofenac Sodium (VOLTAREN) 1 %, Apply 2 g topically 4 (four) times a day, Disp: 350 g, Rfl: 5    Ergocalciferol 2000 units CAPS, Take 2,000 Units by mouth daily, Disp: 30 capsule, Rfl: 0    LORazepam (ATIVAN) 0 5 mg tablet, Take 1 tablet (0 5 mg total) by mouth daily as needed for anxiety, Disp: 15 tablet, Rfl: 0    meloxicam (MOBIC) 15 mg tablet, Take 1 tablet (15 mg total) by mouth daily, Disp: 30 tablet, Rfl: 5    metroNIDAZOLE (METROCREAM) 0 75 % cream, APPLY TO FACE TWICE A DAY, Disp: , Rfl:     omeprazole (PriLOSEC) 40 MG capsule, Take 1 capsule (40 mg total) by mouth daily before breakfast, Disp: 90 capsule, Rfl: 1    OnabotulinumtoxinA (BOTOX IJ), Inject as directed For Migraines by neurology, Disp: , Rfl:     Triamcinolone Acetonide (NASACORT ALLERGY 24HR) 55 MCG/ACT AERO, into each nostril, Disp: , Rfl:      No problem-specific Assessment & Plan notes found for this encounter  Diagnoses and all orders for this visit:    Health maintenance examination    Reactive arthritis ([address] )  -     meloxicam (MOBIC) 15 mg tablet; Take 1 tablet (15 mg total) by mouth daily    Gastroesophageal reflux disease, unspecified whether esophagitis present    Cervical dystonia  -     Diclofenac Sodium (VOLTAREN) 1 %; Apply 2 g topically 4 (four) times a day    Migraine without aura and without status migrainosus, not intractable    Other orders  -     Ivermectin (Soolantra) 1 % CREA; Apply topically daily  -     metroNIDAZOLE (METROCREAM) 0 75 % cream; APPLY TO FACE TWICE A DAY        Patient Instructions    History is reviewed  Seems to have a reactive arthritis which comes and goes and knocks her out  Blood work reviewed  Rheumatologic studies all negative  Noted in the past have wonderful cholesterol and has no risk factors for heart disease  She would like to be able to run again  Suggest using meloxicam 15 mg on a regular basis for 1 month and then reassess  Hopefully will help her neck, shoulder, and her lower extremities  Stretching exercises for the right shoulder which could be injected again  Due for a tetanus shot when she is done getting her COVID vaccines  Observation of post COVID symptoms which include shortness of breath and palpitations  Trial of diclofenac gel which can be applied 4 times a day to the affected area in her neck  Recheck in 1 month

## 2021-04-29 NOTE — PATIENT INSTRUCTIONS
History is reviewed  Seems to have a reactive arthritis which comes and goes and knocks her out  Blood work reviewed  Rheumatologic studies all negative  Noted in the past have wonderful cholesterol and has no risk factors for heart disease  She would like to be able to run again  Suggest using meloxicam 15 mg on a regular basis for 1 month and then reassess  Hopefully will help her neck, shoulder, and her lower extremities  Stretching exercises for the right shoulder which could be injected again  Due for a tetanus shot when she is done getting her COVID vaccines  Observation of post COVID symptoms which include shortness of breath and palpitations  Trial of diclofenac gel which can be applied 4 times a day to the affected area in her neck  Recheck in 1 month

## 2021-05-04 ENCOUNTER — TELEPHONE (OUTPATIENT)
Dept: NEUROLOGY | Facility: CLINIC | Age: 46
End: 2021-05-04

## 2021-05-04 NOTE — TELEPHONE ENCOUNTER
pt left vm stating that she is scheduled for botox on 5/13 and her second COVID vaccine on 5/11   asking if this is ok    [phone]  Left message for pt per communication consent that there is no issues with getting botox and vaccine around the same time

## 2021-05-11 ENCOUNTER — IMMUNIZATIONS (OUTPATIENT)
Dept: FAMILY MEDICINE CLINIC | Facility: HOSPITAL | Age: 46
End: 2021-05-11

## 2021-05-11 DIAGNOSIS — Z23 ENCOUNTER FOR IMMUNIZATION: Primary | ICD-10-CM

## 2021-05-11 PROCEDURE — 0002A SARS-COV-2 / COVID-19 MRNA VACCINE (PFIZER-BIONTECH) 30 MCG: CPT

## 2021-05-11 PROCEDURE — 91300 SARS-COV-2 / COVID-19 MRNA VACCINE (PFIZER-BIONTECH) 30 MCG: CPT

## 2021-05-13 ENCOUNTER — TELEPHONE (OUTPATIENT)
Dept: NEUROLOGY | Facility: CLINIC | Age: 46
End: 2021-05-13

## 2021-05-13 NOTE — TELEPHONE ENCOUNTER
----- Message from Avani Long sent at 5/13/2021  8:05 AM EDT -----  Regarding: BOTOX r/s TODAY  Patient called in, not feeling well  Got 2nd COVID vaccine this Tuesday, feels she is having side effects  Would like to reschedule  Current appointment today 05/13/2021 @ 11:30am w/Blessing Mayorga  Call patient back at: [phone]  Thank you!         El Cobb

## 2021-05-13 NOTE — TELEPHONE ENCOUNTER
Patient called in, not feeling well  Got 2nd COVID vaccine this Tuesday, feels she is having side effects  Would like to reschedule  Current appointment today 05/13/2021 @ 11:30am kimberly/Blessing Mayorga  Call patient back at: [phone]  Sent the above to MA, via staff message  Patient aware she will be contacted to reschedule

## 2021-05-20 ENCOUNTER — PROCEDURE VISIT (OUTPATIENT)
Dept: NEUROLOGY | Facility: CLINIC | Age: 46
End: 2021-05-20
Payer: COMMERCIAL

## 2021-05-20 VITALS — TEMPERATURE: 98.5 F | HEART RATE: 85 BPM | SYSTOLIC BLOOD PRESSURE: 132 MMHG | DIASTOLIC BLOOD PRESSURE: 78 MMHG

## 2021-05-20 DIAGNOSIS — G24.3 CERVICAL DYSTONIA: Primary | ICD-10-CM

## 2021-05-20 PROCEDURE — 64616 CHEMODENERV MUSC NECK DYSTON: CPT | Performed by: PHYSICIAN ASSISTANT

## 2021-05-20 NOTE — PROGRESS NOTES
Universal Protocol   Consent: Verbal consent obtained  Written consent obtained  Risks and benefits: risks, benefits and alternatives were discussed  Consent given by: patient  Time out: Immediately prior to procedure a "time out" was called to verify the correct patient, procedure, equipment, support staff and site/side marked as required  Patient understanding: patient states understanding of the procedure being performed  Patient consent: the patient's understanding of the procedure matches consent given  Procedure consent: procedure consent matches procedure scheduled  Relevant documents: relevant documents present and verified  Patient identity confirmed: verbally with patient        Chemodenervation     Date/Time 5/20/2021 3:25 PM     Performed by  Paula Cerrato PA-C     Authorized by Paula Cerrato PA-C        Pre-procedure details      Preparation: Patient was prepped and draped in usual sterile fashion      Prepped With: Alcohol     Anesthesia  (see MAR for exact dosages):      Anesthesia method:  None   Procedure details     Position:  Upright   Botox     Brand:  Botox    mL's of Botulinum Toxin:  200    Final Concentration per CC:  50 units    Needle Gauge:  30 G 2 5 inch   Procedures     Botox Procedures: cervical dystonia and chronic headache      Indications: spasmodic torticollis and migraines     Injection Location      Head / Face:  R superior cervical paraspinal, L superior cervical paraspinal, L , R , L medial occipitalis, R medial occipitalis, procerus, R temporalis, L temporalis, L frontalis, R frontalis, L orbicularis oculi and R orbicularis oculi    L  injection amount:  5 unit(s)    R  injection amount:  5 unit(s)    L lateral frontalis:  5 unit(s)    R lateral frontalis:  5 unit(s)    L medial frontalis:  5 unit(s)    R medial frontalis:  5 unit(s)    L temporalis injection amount:  20 unit(s)    R temporalis injection amount:  20 unit(s)    Procerus injection amount:  5 unit(s)    L orbicularis oculi injection amount:  5 unit(s)    R orbicularis oculi injection amount:  5 unit(s)    L medial occipitalis injection amount:  15 unit(s)    R medial occipitalis injection amount:  15 unit(s)    L superior cervical paraspinal injection amount:  10 unit(s)    R superior cervical paraspinal injection amount:  10 unit(s)    Cervical Dystonia / Salivary Gland:  R upper trapezius, L upper trapezius, L sternocleidomastoid and R sternocleidomastoid      L sternocleidomastoid injection amount:  10 unit(s)      R sternocleidomastoid injection amount:  5 unit(s)      L upper trapezius injection amount:  15 unit(s)      R upper trapezius injection amount:  15 unit(s)   Total Units     Total units used:  200    Total units discarded:  0   Post-procedure details      Chemodenervation:  Neck, excluding muscles of the larynx    Neck Muscle Location[de-identified]  Bilateral neck muscle    Patient tolerance of procedure:   Tolerated well, no immediate complications   Comments      10 units parietal bilaterally  All medically necessary

## 2021-05-20 NOTE — PROGRESS NOTES
Universal Protocol   Consent: Verbal consent obtained  Written consent obtained  Risks and benefits: risks, benefits and alternatives were discussed  Consent given by: patient  Time out: Immediately prior to procedure a "time out" was called to verify the correct patient, procedure, equipment, support staff and site/side marked as required  Patient understanding: patient states understanding of the procedure being performed  Patient consent: the patient's understanding of the procedure matches consent given  Procedure consent: procedure consent matches procedure scheduled  Relevant documents: relevant documents present and verified  Patient identity confirmed: verbally with patient        Chemodenervation     Date/Time 5/20/2021 3:23 PM     Performed by  Lucie Whittington PA-C     Authorized by Lucie Whittington PA-C        Pre-procedure details      Prepped With: Alcohol     Anesthesia  (see MAR for exact dosages):      Anesthesia method:  None   Procedure details     Position:  Upright   Botox     Botox Type:  Type A    Brand:  Botox    mL's of Botulinum Toxin:  200    Final Concentration per CC:  50 units    Needle Gauge:  30 G 2 5 inch   Procedures     Botox Procedures: chronic headache      Indications: migraines     Injection Location      Head / Face:  L superior cervical paraspinal, R superior cervical paraspinal, L , R , L frontalis, R frontalis, L medial occipitalis, R medial occipitalis, procerus, R temporalis, L temporalis, R superior trapezius and L superior trapezius    L  injection amount:  5 unit(s)    R  injection amount:  5 unit(s)    L lateral frontalis:  5 unit(s)    R lateral frontalis:  5 unit(s)    L medial frontalis:  5 unit(s)    R medial frontalis:  5 unit(s)    L temporalis injection amount:  20 unit(s)    R temporalis injection amount:  20 unit(s)    Procerus injection amount:  5 unit(s)    L medial occipitalis injection amount:  15 unit(s)    R medial occipitalis injection amount:  15 unit(s)    L superior cervical paraspinal injection amount:  10 unit(s)    R superior cervical paraspinal injection amount:  10 unit(s)    L superior trapezius injection amount:  15 unit(s)    R superior trapezius injection amount:  15 unit(s)   Total Units     Total units used:  200    Total units discarded:  0   Post-procedure details      Chemodenervation:  Chronic migraine    Facial Nerve Location[de-identified]  Bilateral facial nerve    Patient tolerance of procedure:   Tolerated well, no immediate complications   Comments      5 units orbicularis oculi bilaterally  10 units left SCM  5 units right SCM  10 units parietal bilaterally  All medically necessary

## 2021-05-24 ENCOUNTER — TELEPHONE (OUTPATIENT)
Dept: NEUROLOGY | Facility: CLINIC | Age: 46
End: 2021-05-24

## 2021-05-24 ENCOUNTER — TELEMEDICINE (OUTPATIENT)
Dept: NEUROLOGY | Facility: CLINIC | Age: 46
End: 2021-05-24
Payer: COMMERCIAL

## 2021-05-24 VITALS — HEIGHT: 60 IN | BODY MASS INDEX: 23.56 KG/M2 | WEIGHT: 120 LBS

## 2021-05-24 DIAGNOSIS — G43.009 MIGRAINE WITHOUT AURA AND WITHOUT STATUS MIGRAINOSUS, NOT INTRACTABLE: Primary | ICD-10-CM

## 2021-05-24 DIAGNOSIS — G24.3 CERVICAL DYSTONIA: ICD-10-CM

## 2021-05-24 PROCEDURE — 3008F BODY MASS INDEX DOCD: CPT | Performed by: NURSE PRACTITIONER

## 2021-05-24 PROCEDURE — 99024 POSTOP FOLLOW-UP VISIT: CPT | Performed by: NURSE PRACTITIONER

## 2021-05-24 NOTE — PROGRESS NOTES
Virtual Regular Visit      Assessment/Plan:    Problem List Items Addressed This Visit        Cardiovascular and Mediastinum    Migraine without aura and without status migrainosus, not intractable - Primary     Patient with relatively well controlled migraines with combination of Botox therapy every 3 months and amitriptyline 20 mg HS  Prior to Botox therapy, she was having migraines nearly daily  She currently has migraines one to two times per month  She notes that when she does get migraines they are less severe and last for less time than prior to Botox  She uses an essential oil roll on, ice packs, ibuprofen, and rest to abort these headaches  Her prior MRI brain was unrevealing  Her limited video neurologic exam is non focal      Recommend that patient continue with Botox therapy every 3 months as this has afforded her great control of her migraines  She has had greater than 7 day per month decrease in migraine frequency, less use of abortive medication, and has been able to avoid frequent ER visits  The amitriptyline is also helpful in preventing her migraines and neck pain which she will continue at this time  With worsening of migraines, she was instructed to call the office  Could consider Emgality as an option in the future with worsening of headaches in addition to her current therapy  Patient will continue with scheduled Botox and follow up for yearly visit in 1 year or sooner if needed  Nervous and Auditory    Cervical dystonia     Botox injections and amitriptyline have been helpful  She does not respond well to muscle relaxers  Continue with amitriptyline, Botox, and conservative therapy                       Reason for visit is   Chief Complaint   Patient presents with    Virtual Brief Visit     reauth for Botox    Virtual Regular Visit        Encounter provider JAZZY Zuniga    Provider located at [address] [facility] [address] [facility] [address] [phone]      Recent Visits  No visits were found meeting these conditions  Showing recent visits within past 7 days and meeting all other requirements     Today's Visits  Date Type Provider Dept   05/24/21 Telephone Munira Curiel, [address]   05/24/21 Telemedicine [place] Herrera JAZZY Marks Pg Neuro [facility] Sukhjinder   Showing today's visits and meeting all other requirements     Future Appointments  No visits were found meeting these conditions  Showing future appointments within next 150 days and meeting all other requirements        The patient was identified by name and date of birth  Shawn Cortes was informed that this is a telemedicine visit and that the visit is being conducted through [address] [place] [address] [facility] and patient was informed that this is a secure, HIPAA-compliant platform  She agrees to proceed  My office door was closed  The patient was notified the following individuals were present in the room [place], [place]  She acknowledged consent and understanding of privacy and security of the video platform  The patient has agreed to participate and understands they can discontinue the visit at any time  Patient is aware this is a billable service  Steffi Rapp is a 41 year yo female with migraines  She also has a PMH of GERD, Batres's esophagus wo dysplasia, reactive arthritis, and generalized anxiety  While the patient has been getting botox for her migraines for about two years, her last official office visit was with Yamilka Walker on 9/4/2019  At that time attempt to wean amitriptyline after initiation of Botox was not tolerated  Noted to have neck pain with cervical dystonia as well  She has been receiving Botox injections every 3 months since for her migraine headaches  Her visit today is for Botox re-authorization  She notes that the Botox helps to manage her migraines   She notes that when she has to extend her visit, she can tell she missed the Botox  Her allergies may trigger her headaches  She notes that when she had COVID she had a bad migraine    Currently gets 1-2 migraines per month that are short  They are not as severe as they were prior to Botox  Sometimes she tries essential oils, heat/cold pack, and if those don't work she takes ibuprofen  If that does not work she will go to sleep and she will typically feel better when she wakes up  In the past, prior to Botox she notes that she was having migraines daily  She notes that she takes amitriptyline 20 mg daily at bedtime  When she has tried ot come off of amitriptyline she did not tolerate it  She still gets a lot of neck and shoulder pain  She believes that the amitriptyline helps this as well  A lot of her migraines do stem from the back of her neck  She does not like muscle relaxers  She has tried muscle rubs and heating pads   She notes that cold packs work better than heat for her head and neck  She does use a "migraine stick" roller ball when she feels a migraine coming on and it does help which contains peppermint, spearmint, and lavender oils  She notes that in the past she did not respond to conventional migraine therapies such as triptan  9/26/2018 MRI brain (open air MRI):    Results for Carlene Lopez (MRN [id number]) :   Ref   Range 6/18/2019 09:52   Sodium Latest Ref Range: 136 - 145 mmol/L 139   Potassium Latest Ref Range: 3 5 - 5 3 mmol/L 3 9   Chloride Latest Ref Range: 100 - 108 mmol/L 105   CO2 Latest Ref Range: 21 - 32 mmol/L 31   Anion Gap Latest Ref Range: 4 - 13 mmol/L 3 (L)   BUN Latest Ref Range: 5 - 25 mg/dL 10   Creatinine Latest Ref Range: 0 60 - 1 30 mg/dL 0 83   Glucose, Random Latest Ref Range: 65 - 140 mg/dL 74   Calcium Latest Ref Range: 8 3 - 10 1 mg/dL 8 9   AST Latest Ref Range: 5 - 45 U/L 8   ALT Latest Ref Range: 12 - 78 U/L 20   Alkaline Phosphatase Latest Ref Range: 46 - 116 U/L 31 (L)   Total Protein Latest Ref Range: 6 4 - 8 2 g/dL 6 9   Albumin Latest Ref Range: 3 5 - 5 0 g/dL 3 9   TOTAL BILIRUBIN Latest Ref Range: 0 20 - 1 00 mg/dL 0 41   eGFR Latest Units: ml/min/1 73sq m 87   Vit D, 25-Hydroxy Latest Ref Range: 30 0 - 100 0 ng/mL 28 0 (L)   WBC Latest Ref Range: 4 31 - 10 16 Thousand/uL 4 82   Red Blood Cell Count Latest Ref Range: 3 81 - 5 12 Million/uL 4 06   Hemoglobin Latest Ref Range: 11 5 - 15 4 g/dL 12 6   HCT Latest Ref Range: 34 8 - 46 1 % 38 5   MCV Latest Ref Range: 82 - 98 fL 95   MCH Latest Ref Range: 26 8 - 34 3 pg 31 0   MCHC Latest Ref Range: 31 4 - 37 4 g/dL 32 7   RDW Latest Ref Range: 11 6 - 15 1 % 11 9   Platelet Count Latest Ref Range: 149 - 390 Thousands/uL 279   MPV Latest Ref Range: 8 9 - 12 7 fL 11 0   nRBC Latest Units: /100 WBCs 0   Neutrophils % Latest Ref Range: 43 - 75 % 61   Immat GRANS % Latest Ref Range: 0 - 2 % 0   Lymphocytes Relative Latest Ref Range: 14 - 44 % 28   Monocytes Relative Latest Ref Range: 4 - 12 % 8   Eosinophils Latest Ref Range: 0 - 6 % 2   Basophils Relative Latest Ref Range: 0 - 1 % 1   Immature Grans Absolute Latest Ref Range: 0 00 - 0 20 Thousand/uL 0 00   Absolute Neutrophils Latest Ref Range: 1 85 - 7 62 Thousands/µL 2 94   Lymphocytes Absolute Latest Ref Range: 0 60 - 4 47 Thousands/µL 1 37   Absolute Monocytes Latest Ref Range: 0 17 - 1 22 Thousand/µL 0 39   Absolute Eosinophils Latest Ref Range: 0 00 - 0 61 Thousand/µL 0 09   Basophils Absolute Latest Ref Range: 0 00 - 0 10 Thousands/µL 0 03   Sed Rate Latest Ref Range: 0 - 20 mm/hour 11   TSH 3RD GENERATON Latest Ref Range: 0 358 - 3 740 uIU/mL 1 470      I reviewed prior neurology notes, as documented in [employer], and summarized above              Past Medical History:   Diagnosis Date    Allergic rhinitis 4/7/2015    Anal fissure     Batres's esophagus without dysplasia 5/20/2020    Family history of colon cancer in mother 10/19/2018    Gastritis 1/8/2020    Gastroesophageal reflux disease 5/20/2020    Generalized anxiety disorder 9/10/2018    History of COVID-19 12/2020    Migraine without aura and without status migrainosus, not intractable 5/17/2019    Reactive arthritis ([facility] [address] ) 4/29/2021       Past Surgical History:   Procedure Laterality Date    COLONOSCOPY  07/2019    HEMORRHOID SURGERY      AR REMOVAL ANAL FISTULA,SUBCUTANEOUS N/A 3/26/2020    Procedure: FISTULOTOMY;  Surgeon: Shereen Fairchild MD;  [facility];  Service: Colorectal    AR SURG DIAGNOSTIC EXAM, ANORECTAL N/A 3/26/2020    Procedure: EXAM UNDER ANESTHESIA (EUA), ENDOANAL ULTRASOUND;  Surgeon: Shereen Fairchild MD;  [facility];  Service: Colorectal    UPPER GASTROINTESTINAL ENDOSCOPY  07/2019    VARICOSE VEIN SURGERY         Current Outpatient Medications   Medication Sig Dispense Refill    amitriptyline (ELAVIL) 10 mg tablet TAKE 2 TABLETS DAILY AT BEDTIME 180 tablet 3    Diclofenac Sodium (VOLTAREN) 1 % Apply 2 g topically 4 (four) times a day 350 g 5    Ivermectin (Soolantra) 1 % CREA Apply topically daily      LORazepam (ATIVAN) 0 5 mg tablet Take 1 tablet (0 5 mg total) by mouth daily as needed for anxiety 15 tablet 0    omeprazole (PriLOSEC) 40 MG capsule Take 1 capsule (40 mg total) by mouth daily before breakfast 90 capsule 1    OnabotulinumtoxinA (BOTOX IJ) Inject as directed For Migraines by neurology      meloxicam (MOBIC) 15 mg tablet Take 1 tablet (15 mg total) by mouth daily (Patient not taking: Reported on 5/24/2021) 30 tablet 5    metroNIDAZOLE (METROCREAM) 0 75 % cream APPLY TO FACE TWICE A DAY       No current facility-administered medications for this visit  Allergies   Allergen Reactions    Sulfamethoxazole-Trimethoprim Swelling, Rash and Anaphylaxis     Joint pain, neck swelling, rash    Other      Other reaction(s): rash    Pollen Extract Headache       Review of Systems  Review of Systems   Constitutional: Negative  Negative for appetite change and fever  HENT: Negative  Negative for hearing loss, tinnitus, trouble swallowing and voice change  Eyes: Negative  Negative for photophobia and pain  Respiratory: Negative  Negative for shortness of breath  Cardiovascular: Negative  Negative for palpitations  Gastrointestinal: Negative  Negative for nausea and vomiting  Endocrine: Negative  Negative for cold intolerance  Genitourinary: Negative  Negative for dysuria, frequency and urgency  Musculoskeletal: Positive for neck pain  Negative for myalgias  Skin: Negative  Negative for rash  Neurological: Positive for headaches  Negative for dizziness, tremors, seizures, syncope, facial asymmetry, speech difficulty, weakness, light-headedness and numbness  Hematological: Negative  Does not bruise/bleed easily  Psychiatric/Behavioral: Negative  Negative for confusion, hallucinations and sleep disturbance  ROS obtained by MA and reviewed    Video Exam    Vitals:    05/24/21 1501   Weight: 54 4 kg (120 lb)   Height: 5' (1 524 m)       Physical Exam   Physical Exam - limited as this is a video visit  No apparent distress  Appears well nourished  Mood appropriate for situation     Neurologic Exam- limited as this is a video visit  Mental status- alert and oriented to person, place, and time  Speech appropriate for conversation  Good attention and knowledge  Cranial Nerves- EOMS normal, facial sensation and muscles symmetric, hearing intact bilaterally to finger rubs, tongue midline, palate rise symmetrical, shoulder shrug symmetrical   Motor- Moves all extremities freely  No tremor  Sensory-  Intact distally in all extremities to light touch  DTRs- unable to assess  Gait- unable to assess  Coordination- unable to assess     I spent 15 minutes directly with the patient during this visit      VIRTUAL VISIT [address] Freeman acknowledges that she has consented to an online visit or consultation   She understands that the online visit is based solely on information provided by her, and that, in the absence of a face-to-face physical evaluation by the physician, the diagnosis she receives is both limited and provisional in terms of accuracy and completeness  This is not intended to replace a full medical face-to-face evaluation by the physician  Gracy Cuellar understands and accepts these terms

## 2021-05-24 NOTE — PATIENT INSTRUCTIONS
1  Continue with amitriptyline 20 mg daily and Botox every 3 months  2  Call the office with any worsening of headaches or a headache you can not break  3  Continue to tamiko adorno yearly

## 2021-05-24 NOTE — PROGRESS NOTES
Virtual Brief Visit    Assessment/Plan:    Problem List Items Addressed This Visit     None                Reason for visit is   Chief Complaint   Patient presents with    Virtual Brief Visit        Encounter provider JAZZY Marquez    Provider located at [address] [place] [address]  [phone]    Recent Visits  No visits were found meeting these conditions  Showing recent visits within past 7 days and meeting all other requirements     Today's Visits  Date Type Provider Dept   05/24/21 Telephone Trever Samuel, [address] 05/24/21 Telemedicine JAZZY Bustos Pg [facility]   Showing today's visits and meeting all other requirements     Future Appointments  No visits were found meeting these conditions  Showing future appointments within next 150 days and meeting all other requirements        After connecting through {AMB VIRTUAL VISIT CFXKLB:89823}, the patient was identified by name and date of birth  Mark Oneill was informed that this is a telemedicine visit and that the visit is being conducted through {AMB CORONAVIRUS VISIT ABWEHT:14264}  {Telemedicine confidentiality :38194} {Telemedicine participants:03775}  She acknowledged consent and understanding of privacy and security of the platform  The patient has agreed to participate and understands she can discontinue the visit at any time  Patient is aware this is a billable service  Subjective    Mark Oneill is a 41 y o  female ***    HPI     Past Medical History:   Diagnosis Date    Allergic rhinitis 4/7/2015    Anal fissure     Batres's esophagus without dysplasia 5/20/2020    Family history of colon cancer in mother 10/19/2018    Gastritis 1/8/2020    Gastroesophageal reflux disease 5/20/2020    Generalized anxiety disorder 9/10/2018    History of COVID-19 12/2020    Migraine without aura and without status migrainosus, not intractable 5/17/2019    Reactive arthritis ([facility] [address] ) 4/29/2021       Past Surgical History:   Procedure Laterality Date    COLONOSCOPY  07/2019    HEMORRHOID SURGERY      OH REMOVAL ANAL FISTULA,SUBCUTANEOUS N/A 3/26/2020    Procedure: FISTULOTOMY;  Surgeon: Kentrell Quinones MD;  [facility];  Service: Colorectal    OH SURG DIAGNOSTIC EXAM, ANORECTAL N/A 3/26/2020    Procedure: EXAM UNDER ANESTHESIA (EUA), ENDOANAL ULTRASOUND;  Surgeon: Kentrell Quinones MD;  [facility];  Service: Colorectal    UPPER GASTROINTESTINAL ENDOSCOPY  07/2019    VARICOSE VEIN SURGERY         Current Outpatient Medications   Medication Sig Dispense Refill    amitriptyline (ELAVIL) 10 mg tablet TAKE 2 TABLETS DAILY AT BEDTIME 180 tablet 3    Diclofenac Sodium (VOLTAREN) 1 % Apply 2 g topically 4 (four) times a day 350 g 5    Ivermectin (Soolantra) 1 % CREA Apply topically daily      LORazepam (ATIVAN) 0 5 mg tablet Take 1 tablet (0 5 mg total) by mouth daily as needed for anxiety 15 tablet 0    meloxicam (MOBIC) 15 mg tablet Take 1 tablet (15 mg total) by mouth daily 30 tablet 5    metroNIDAZOLE (METROCREAM) 0 75 % cream APPLY TO FACE TWICE A DAY      omeprazole (PriLOSEC) 40 MG capsule Take 1 capsule (40 mg total) by mouth daily before breakfast 90 capsule 1    OnabotulinumtoxinA (BOTOX IJ) Inject as directed For Migraines by neurology       No current facility-administered medications for this visit  Allergies   Allergen Reactions    Sulfamethoxazole-Trimethoprim Swelling, Rash and Anaphylaxis     Joint pain, neck swelling, rash    Other      Other reaction(s): rash    Pollen Extract Headache       Review of Systems    There were no vitals filed for this visit  {covid time spent:15319}    VIRTUAL VISIT DISCLAIMER    Kevin Fernando acknowledges that she has consented to an online visit or consultation   She understands that the online visit is based solely on information provided by her, and that, in the absence of a face-to-face physical evaluation by the physician, the diagnosis she receives is both limited and provisional in terms of accuracy and completeness  This is not intended to replace a full medical face-to-face evaluation by the physician  Shelby Solis understands and accepts these terms

## 2021-05-24 NOTE — TELEPHONE ENCOUNTER
Lmom to get Dominik Omalley ready for her video visit with Ted Mejia and told her I would call her back in a little while

## 2021-05-25 NOTE — ASSESSMENT & PLAN NOTE
Botox injections and amitriptyline have been helpful  She does not respond well to muscle relaxers  Continue with amitriptyline, Botox, and conservative therapy

## 2021-05-25 NOTE — ASSESSMENT & PLAN NOTE
Patient with relatively well controlled migraines with combination of Botox therapy every 3 months and amitriptyline 20 mg HS  Prior to Botox therapy, she was having migraines nearly daily  She currently has migraines one to two times per month  She notes that when she does get migraines they are less severe and last for less time than prior to Botox  She uses an essential oil roll on, ice packs, ibuprofen, and rest to abort these headaches  Her prior MRI brain was unrevealing  Her limited video neurologic exam is non focal      Recommend that patient continue with Botox therapy every 3 months as this has afforded her great control of her migraines  She has had greater than 7 day per month decrease in migraine frequency, less use of abortive medication, and has been able to avoid frequent ER visits  The amitriptyline is also helpful in preventing her migraines and neck pain which she will continue at this time  With worsening of migraines, she was instructed to call the office  Could consider Emgality as an option in the future with worsening of headaches in addition to her current therapy  Patient will continue with scheduled Botox and follow up for yearly visit in 1 year or sooner if needed

## 2021-07-20 ENCOUNTER — TELEPHONE (OUTPATIENT)
Dept: NEUROLOGY | Facility: CLINIC | Age: 46
End: 2021-07-20

## 2021-07-20 NOTE — TELEPHONE ENCOUNTER
Botox 200 units approved   Auth # [id number] Valid 08/10/2021-08/09/2022  Valid -4 visits    Please get from [address]

## 2021-08-09 NOTE — TELEPHONE ENCOUNTER
[address] [facility] and spoke with Nydia Macdonald   He states that the order is still with [employer]  He will send an expedited email for it to be reviewed

## 2021-08-09 NOTE — TELEPHONE ENCOUNTER
[address] [facility] and spoke to Margie Lo  She states Medication still with insurance verification   Will f/u

## 2021-08-11 ENCOUNTER — TELEPHONE (OUTPATIENT)
Dept: NEUROLOGY | Facility: CLINIC | Age: 46
End: 2021-08-11

## 2021-08-11 NOTE — TELEPHONE ENCOUNTER
Botox 200 units  To be delivered 08/19/2021  Via [employer] Priority  Signature Req    Please let me know if not recv by 2pm  or not at all

## 2021-08-12 ENCOUNTER — OFFICE VISIT (OUTPATIENT)
Dept: NEUROLOGY | Facility: CLINIC | Age: 46
End: 2021-08-12
Payer: COMMERCIAL

## 2021-08-12 VITALS
RESPIRATION RATE: 18 BRPM | BODY MASS INDEX: 24.42 KG/M2 | DIASTOLIC BLOOD PRESSURE: 74 MMHG | SYSTOLIC BLOOD PRESSURE: 108 MMHG | WEIGHT: 124.4 LBS | HEIGHT: 60 IN | TEMPERATURE: 98 F | HEART RATE: 95 BPM

## 2021-08-12 DIAGNOSIS — M79.18 MYOFASCIAL PAIN: ICD-10-CM

## 2021-08-12 DIAGNOSIS — G24.3 CERVICAL DYSTONIA: ICD-10-CM

## 2021-08-12 DIAGNOSIS — G43.709 CHRONIC MIGRAINE WITHOUT AURA WITHOUT STATUS MIGRAINOSUS, NOT INTRACTABLE: Primary | ICD-10-CM

## 2021-08-12 PROCEDURE — 20553 NJX 1/MLT TRIGGER POINTS 3/>: CPT | Performed by: PHYSICIAN ASSISTANT

## 2021-08-12 PROCEDURE — 99214 OFFICE O/P EST MOD 30 MIN: CPT | Performed by: PHYSICIAN ASSISTANT

## 2021-08-12 PROCEDURE — 3008F BODY MASS INDEX DOCD: CPT | Performed by: PHYSICIAN ASSISTANT

## 2021-08-12 PROCEDURE — 1036F TOBACCO NON-USER: CPT | Performed by: PHYSICIAN ASSISTANT

## 2021-08-12 RX ORDER — BUPIVACAINE HYDROCHLORIDE 2.5 MG/ML
5 INJECTION, SOLUTION EPIDURAL; INFILTRATION; INTRACAUDAL ONCE
Status: COMPLETED | OUTPATIENT
Start: 2021-08-12 | End: 2021-08-12

## 2021-08-12 RX ORDER — KETOROLAC TROMETHAMINE 10 MG/1
10 TABLET, FILM COATED ORAL EVERY 6 HOURS PRN
Qty: 10 TABLET | Refills: 0 | Status: SHIPPED | OUTPATIENT
Start: 2021-08-12 | End: 2021-08-12

## 2021-08-12 RX ORDER — METHOCARBAMOL 500 MG/1
TABLET, FILM COATED ORAL
Qty: 30 TABLET | Refills: 0 | Status: SHIPPED | OUTPATIENT
Start: 2021-08-12

## 2021-08-12 RX ADMIN — BUPIVACAINE HYDROCHLORIDE 5 ML: 2.5 INJECTION, SOLUTION EPIDURAL; INFILTRATION; INTRACAUDAL at 15:06

## 2021-08-12 NOTE — PROGRESS NOTES
Neurology    Barney Wing is a 41 y o  female  [id number]      Assessment/Recommendations:     Diagnoses and all orders for this visit:    Chronic migraine without aura without status migrainosus, not intractable  -     Discontinue: ketorolac (TORADOL) 10 mg tablet; Take 1 tablet (10 mg total) by mouth every 6 (six) hours as needed (migraine) Max 2-3 per week  Cervical dystonia    Myofascial pain  -     Inj Trigger Point Single/Multiple  -     bupivacaine (PF) (MARCAINE) 0 25 % injection 5 mL  -     methocarbamol (ROBAXIN) 500 mg tablet; 1 tab qhs rn neck pain           The patient reports worsening myofascial pain in the right cervical paraspinal region which is causing more headaches /migraine headaches  Likely neck pain and spasm is triggered by excessive activities as noted below  She denies upper extremity weakness or imbalance  Trigger point injections were performed today on an emergent basis and as a part of ongoing therapy  She did get some relief of her pain following the injections  I recommended ice later today, and to take Advil if needed and/or use diclofenac cream   She may get greater relief within 24-48 hours  Can use methocarbamol p r n  as well  Side effects reviewed  She has Botox scheduled 8/23/2021  Since starting botox, the patient reports greater than 7 days of migraine relief from baseline, correlated with headache diary, decreased abortive medication use and decreased ER visits  The patient should not hesitate to call me prior to her follow up with any questions or concerns  Chief Complaint:  Chronic migraine headaches  Neck pain      Subjective:  HPI     Ms Barney Wing is a 78-year-old female who is here for neurological follow-up for chronic migraine headaches  She states she typically gets 1 migraine every 3 months after starting Botox    Since starting botox, the patient reports greater than 7 days of migraine relief from baseline, correlated with headache diary, decreased abortive medication use and decreased ER visits  Headaches and neck pain started worsening last Thursday  She states recently that she increased her workouts, with high-intensity intervals, more weightlifting  Recently she went on vacation and was in a car driving for long hours, on the way home they went to an amusement park and she went on to rule coaster rides  She thinks that a combination of the above activities triggered more right-sided neck pain  She reports pain and spasm in the right cervical paraspinal region which sometimes radiates into the head in the back and sometimes into the right frontal region, around the eye and face region  She thinks her headaches are rooted in increased neck pain  She also reports pain radiating into the right upper back and shoulder  Typically ice and massage helps when her neck pain flares but recently has not  She also would take magnesium more often on those days but this has not helped  Sometimes Advil helps but it wears off quickly and she has to take more of it  She also has a very sensitive stomach and when she takes Advil it irritates her stomach  She takes omeprazole and eats food when she uses Advil  She also has diclofenac cream which she rubs on the neck, but she does not use it in conjunction with Advil  PT worked very well in the past- went to [facility] in the past  PT she used to do ultrasound theapy on neck where pain was and this worked very well  She says amitriptyline makes her tired and she does not want to increase the dose at this time      Patient Active Problem List   Diagnosis    Generalized anxiety disorder    Family history of colon cancer in mother    Cervical dystonia    Migraine without aura and without status migrainosus, not intractable    Batres's esophagus without dysplasia    Gastroesophageal reflux disease    Reactive arthritis ([facility] [address]    Myofascial pain     Past Medical History:   Diagnosis Date    Allergic rhinitis 4/7/2015    Anal fissure     Batres's esophagus without dysplasia 5/20/2020    Family history of colon cancer in mother 10/19/2018    Gastritis 1/8/2020    Gastroesophageal reflux disease 5/20/2020    Generalized anxiety disorder 9/10/2018    History of COVID-19 12/2020    Migraine without aura and without status migrainosus, not intractable 5/17/2019    Reactive arthritis ([address] ) 4/29/2021     Past Surgical History:   Procedure Laterality Date    COLONOSCOPY  07/2019    HEMORRHOID SURGERY      OH REMOVAL ANAL FISTULA,SUBCUTANEOUS N/A 3/26/2020    Procedure: FISTULOTOMY;  Surgeon: Geoff Henriquez MD;  [facility]: [facility];  Service: Colorectal    OH SURG DIAGNOSTIC EXAM, ANORECTAL N/A 3/26/2020    Procedure: EXAM UNDER ANESTHESIA (EUA), ENDOANAL ULTRASOUND;  Surgeon: Geoff Henriquez MD;  [facility];  Service: Colorectal    UPPER GASTROINTESTINAL ENDOSCOPY  07/2019    VARICOSE VEIN SURGERY       Current Outpatient Medications on File Prior to Visit   Medication Sig Dispense Refill    amitriptyline (ELAVIL) 10 mg tablet TAKE 2 TABLETS DAILY AT BEDTIME 180 tablet 3    Diclofenac Sodium (VOLTAREN) 1 % Apply 2 g topically 4 (four) times a day 350 g 5    Ivermectin (Soolantra) 1 % CREA Apply topically as needed       LORazepam (ATIVAN) 0 5 mg tablet Take 1 tablet (0 5 mg total) by mouth daily as needed for anxiety 15 tablet 0    omeprazole (PriLOSEC) 40 MG capsule TAKE 1 CAPSULE DAILY BEFORE BREAKFAST 90 capsule 0    OnabotulinumtoxinA (BOTOX IJ) Inject as directed For Migraines by neurology      meloxicam (MOBIC) 15 mg tablet Take 1 tablet (15 mg total) by mouth daily (Patient not taking: Reported on 5/24/2021) 30 tablet 5    metroNIDAZOLE (METROCREAM) 0 75 % cream APPLY TO FACE TWICE A DAY (Patient not taking: Reported on 8/12/2021)       No current facility-administered medications on file prior to visit       Allergies   Allergen Reactions    Sulfamethoxazole-Trimethoprim Swelling, Rash and Anaphylaxis     Joint pain, neck swelling, rash    Other      Other reaction(s): rash    Pollen Extract Headache           ROS:  Review of Systems   Constitutional: Positive for fatigue  Negative for appetite change and fever  HENT: Positive for ear pain (behind right ear) and tinnitus (right ear off and on)  Negative for hearing loss, trouble swallowing and voice change  Eyes: Negative  Negative for photophobia and pain  Respiratory: Negative  Negative for shortness of breath  Cardiovascular: Negative  Negative for palpitations  Gastrointestinal: Negative  Negative for nausea and vomiting  Endocrine: Negative  Negative for cold intolerance  Genitourinary: Negative  Negative for dysuria, frequency and urgency  Musculoskeletal: Positive for neck pain  Negative for myalgias  Right shoulder pain blade   Skin: Negative  Negative for rash  Neurological: Positive for numbness (right hand - 1st and 2nd -4th and 5th - tingling  and numbness) and headaches  Negative for dizziness, tremors, seizures, syncope, facial asymmetry, speech difficulty, weakness and light-headedness  Face- right side   Hematological: Negative  Does not bruise/bleed easily  Psychiatric/Behavioral: Positive for sleep disturbance  Negative for confusion and hallucinations  Objective:  /74 (BP Location: Right arm, Patient Position: Sitting, Cuff Size: Standard)   Pulse 95   Temp 98 °F (36 7 °C)   Resp 18   Ht 5' (1 524 m)   Wt 56 4 kg (124 lb 6 4 oz)   BMI 24 30 kg/m²     Physical Exam    Neurological Exam  Vital signs reviewed  Well developed, well nourished  Head: Normocephalic, atraumatic  Neck: Neck flexors 5/5, TTP R side of neck/ traps/ upper back (in the muscles noted in procedure below); slightly decreased ROM to R and L  CN 2-12: intact and symmetric, including EOMs which are normal b/l and PERRL  MSK: 5/5 t/o   ROM normal x all 4 extr   Reflexes: 2+ and symmetric in the UEs b/l  Coordination: Nml x4 extr  Gait: Steady normal gait  The following portions of the patient's history were reviewed and updated as appropriate: allergies, current medications, family history, past medical history, social history and active problem list   Review of systems was reviewed and otherwise unremarkable from a neurological perspective  Inj Trigger Point Single/Multiple     Date/Time 8/12/2021 3:06 PM     Performed by  Darell Powell PA-C     Authorized by Darell Powell PA-C      Universal Protocol   Consent: The procedure was performed in an emergent situation  Verbal consent obtained  Risks and benefits: risks, benefits and alternatives were discussed  Consent given by: patient       Procedure Details   Procedure Notes: Trigger point injections were performed at the sites of maximal tenderness using a total of 5 mL 0 25% bupivacaine, without steroids, without epinephrine  The following locations were injected using a 30 gauge, 1/2 inch needle: The right trapezius muscles, upper and lower, right cervical paraspinal muscles, right splenius capitis and right rhomboid  The patient reports a moderate reduction of pain following the injections  There were no complications aside from minimal bleeding  She walked out of the office without difficulty  Ice pack was provided  Patient tolerance: patient tolerated the procedure well with no immediate complications             I have spent 30 minutes with the patient today in which greater than 50% of this time was spent in counseling and/or coordination of care regarding diagnoses, test results, impression, plan and potential medication side effects

## 2021-08-19 ENCOUNTER — TELEPHONE (OUTPATIENT)
Dept: NEUROLOGY | Facility: CLINIC | Age: 46
End: 2021-08-19

## 2021-08-19 NOTE — TELEPHONE ENCOUNTER
Botox received 8/19/21 to the [facility] location    August 19, 2021  Abdiel Posey MA     AE    10:07 AM  Note     Botox number of units: 200  Botox quantity: 1  Arrived at what location: [place]   Botox at Correct Administering Location: yes  NDC number: [id number]  Lot number: [id number]  Expiration Date: 04/30/2024  Appt notes indicate correct medication: yes                 AE    10:08 AM  Abdiel Posey MA routed this conversation to [address] [place] [address]

## 2021-08-19 NOTE — TELEPHONE ENCOUNTER
Botox number of units: 200  Botox quantity: 1  Arrived at what location: [place]   Botox at Correct Administering Location: yes  NDC number: [id number]  Lot number: [id number]  Expiration Date: 04/30/2024  Appt notes indicate correct medication: yes

## 2021-08-23 ENCOUNTER — PROCEDURE VISIT (OUTPATIENT)
Dept: NEUROLOGY | Facility: CLINIC | Age: 46
End: 2021-08-23
Payer: COMMERCIAL

## 2021-08-23 VITALS — SYSTOLIC BLOOD PRESSURE: 117 MMHG | HEART RATE: 76 BPM | TEMPERATURE: 97.9 F | DIASTOLIC BLOOD PRESSURE: 71 MMHG

## 2021-08-23 DIAGNOSIS — G43.709 CHRONIC MIGRAINE WITHOUT AURA WITHOUT STATUS MIGRAINOSUS, NOT INTRACTABLE: ICD-10-CM

## 2021-08-23 DIAGNOSIS — G24.3 CERVICAL DYSTONIA: Primary | ICD-10-CM

## 2021-08-23 PROCEDURE — 64616 CHEMODENERV MUSC NECK DYSTON: CPT | Performed by: PHYSICIAN ASSISTANT

## 2021-08-23 NOTE — PROGRESS NOTES
Universal Protocol   Consent: Verbal consent obtained  Written consent obtained  Risks and benefits: risks, benefits and alternatives were discussed  Consent given by: patient  Time out: Immediately prior to procedure a "time out" was called to verify the correct patient, procedure, equipment, support staff and site/side marked as required  Patient understanding: patient states understanding of the procedure being performed  Patient consent: the patient's understanding of the procedure matches consent given  Procedure consent: procedure consent matches procedure scheduled  Relevant documents: relevant documents present and verified  Patient identity confirmed: verbally with patient        Chemodenervation     Date/Time 8/23/2021 1:06 PM     Performed by  Pilar Hanson PA-C     Authorized by Pilar Hanson PA-C        Pre-procedure details      Preparation: Patient was prepped and draped in usual sterile fashion      Prepped With: Alcohol     Anesthesia  (see MAR for exact dosages):      Anesthesia method:  None   Procedure details     Position:  Upright   Botox     Brand:  Botox    mL's of Botulinum Toxin:  200    Final Concentration per CC:  50 units    Needle Gauge:  30 G 2 5 inch   Procedures     Botox Procedures: cervical dystonia and chronic headache      Indications: spasmodic torticollis and migraines     Injection Location      Head / Face:  R superior cervical paraspinal, L superior cervical paraspinal, L , R , L medial occipitalis, R medial occipitalis, procerus, R temporalis, L temporalis, R frontalis and L frontalis    L  injection amount:  5 unit(s)    R  injection amount:  5 unit(s)    L lateral frontalis:  5 unit(s)    R lateral frontalis:  5 unit(s)    L medial frontalis:  5 unit(s)    R medial frontalis:  5 unit(s)    L temporalis injection amount:  20 unit(s)    R temporalis injection amount:  20 unit(s)    Procerus injection amount:  5 unit(s)    L medial occipitalis injection amount:  15 unit(s)    R medial occipitalis injection amount:  15 unit(s)    L superior cervical paraspinal injection amount:  10 unit(s)    R superior cervical paraspinal injection amount:  10 unit(s)    Cervical Dystonia / Salivary Gland:  R upper trapezius, L upper trapezius, L sternocleidomastoid and R sternocleidomastoid      L sternocleidomastoid injection amount:  10 unit(s)      R sternocleidomastoid injection amount:  5 unit(s)      L upper trapezius injection amount:  15 unit(s)      R upper trapezius injection amount:  15 unit(s)   Total Units     Total units used:  200    Total units discarded:  0   Post-procedure details      Chemodenervation:  Neck, excluding muscles of the larynx    Neck Muscle Location[de-identified]  Bilateral neck muscle    Patient tolerance of procedure:   Tolerated well, no immediate complications   Comments      10 units parietal bilaterally  5 units orbicularis oculi bilaterally  All medically necessary

## 2021-09-15 ENCOUNTER — TELEPHONE (OUTPATIENT)
Dept: NEUROLOGY | Facility: CLINIC | Age: 46
End: 2021-09-15

## 2021-09-15 NOTE — TELEPHONE ENCOUNTER
Patient called to see if she can have her botox appts rescheduled with Gutierrez Villasenor from [facility] because she prefers to see [facility]  Please call patient back at [phone]  Thank you

## 2021-10-01 ENCOUNTER — OFFICE VISIT (OUTPATIENT)
Dept: GASTROENTEROLOGY | Facility: MEDICAL CENTER | Age: 46
End: 2021-10-01
Payer: COMMERCIAL

## 2021-10-01 VITALS
BODY MASS INDEX: 25.08 KG/M2 | HEART RATE: 83 BPM | SYSTOLIC BLOOD PRESSURE: 112 MMHG | TEMPERATURE: 97.9 F | DIASTOLIC BLOOD PRESSURE: 70 MMHG | WEIGHT: 128.4 LBS

## 2021-10-01 DIAGNOSIS — K22.70 BARRETT'S ESOPHAGUS WITHOUT DYSPLASIA: Primary | ICD-10-CM

## 2021-10-01 DIAGNOSIS — K60.2 ANAL FISSURE: ICD-10-CM

## 2021-10-01 DIAGNOSIS — K21.9 GASTROESOPHAGEAL REFLUX DISEASE, UNSPECIFIED WHETHER ESOPHAGITIS PRESENT: ICD-10-CM

## 2021-10-01 DIAGNOSIS — M02.30 REACTIVE ARTHRITIS, UNSPECIFIED SITE (HCC): ICD-10-CM

## 2021-10-01 PROCEDURE — 1036F TOBACCO NON-USER: CPT | Performed by: INTERNAL MEDICINE

## 2021-10-01 PROCEDURE — 99213 OFFICE O/P EST LOW 20 MIN: CPT | Performed by: INTERNAL MEDICINE

## 2021-10-06 ENCOUNTER — OFFICE VISIT (OUTPATIENT)
Dept: RHEUMATOLOGY | Facility: CLINIC | Age: 46
End: 2021-10-06
Payer: COMMERCIAL

## 2021-10-06 VITALS
DIASTOLIC BLOOD PRESSURE: 80 MMHG | SYSTOLIC BLOOD PRESSURE: 110 MMHG | HEIGHT: 60 IN | WEIGHT: 128 LBS | BODY MASS INDEX: 25.13 KG/M2

## 2021-10-06 DIAGNOSIS — M02.30 REACTIVE ARTHRITIS, UNSPECIFIED SITE (HCC): ICD-10-CM

## 2021-10-06 DIAGNOSIS — M25.50 ARTHRALGIA, UNSPECIFIED JOINT: Primary | ICD-10-CM

## 2021-10-06 DIAGNOSIS — R53.83 OTHER FATIGUE: ICD-10-CM

## 2021-10-06 PROCEDURE — 3008F BODY MASS INDEX DOCD: CPT | Performed by: INTERNAL MEDICINE

## 2021-10-06 PROCEDURE — 99214 OFFICE O/P EST MOD 30 MIN: CPT | Performed by: INTERNAL MEDICINE

## 2021-10-06 RX ORDER — CELECOXIB 100 MG/1
100 CAPSULE ORAL 2 TIMES DAILY
Qty: 60 CAPSULE | Refills: 3 | Status: SHIPPED | OUTPATIENT
Start: 2021-10-06

## 2021-11-05 ENCOUNTER — TELEPHONE (OUTPATIENT)
Dept: NEUROLOGY | Facility: CLINIC | Age: 46
End: 2021-11-05

## 2021-11-16 ENCOUNTER — TELEPHONE (OUTPATIENT)
Dept: NEUROLOGY | Facility: CLINIC | Age: 46
End: 2021-11-16

## 2021-11-30 ENCOUNTER — PROCEDURE VISIT (OUTPATIENT)
Dept: NEUROLOGY | Facility: CLINIC | Age: 46
End: 2021-11-30
Payer: COMMERCIAL

## 2021-11-30 VITALS — TEMPERATURE: 96.9 F | DIASTOLIC BLOOD PRESSURE: 76 MMHG | SYSTOLIC BLOOD PRESSURE: 118 MMHG

## 2021-11-30 DIAGNOSIS — G43.709 CHRONIC MIGRAINE WITHOUT AURA WITHOUT STATUS MIGRAINOSUS, NOT INTRACTABLE: Primary | ICD-10-CM

## 2021-11-30 DIAGNOSIS — G24.3 CERVICAL DYSTONIA: ICD-10-CM

## 2021-11-30 PROCEDURE — 64615 CHEMODENERV MUSC MIGRAINE: CPT | Performed by: PHYSICIAN ASSISTANT

## 2021-12-03 ENCOUNTER — TELEPHONE (OUTPATIENT)
Dept: GASTROENTEROLOGY | Facility: MEDICAL CENTER | Age: 46
End: 2021-12-03

## 2021-12-05 ENCOUNTER — ANESTHESIA EVENT (OUTPATIENT)
Dept: ANESTHESIOLOGY | Facility: HOSPITAL | Age: 46
End: 2021-12-05

## 2021-12-05 ENCOUNTER — ANESTHESIA (OUTPATIENT)
Dept: ANESTHESIOLOGY | Facility: HOSPITAL | Age: 46
End: 2021-12-05

## 2021-12-06 ENCOUNTER — HOSPITAL ENCOUNTER (OUTPATIENT)
Dept: GASTROENTEROLOGY | Facility: MEDICAL CENTER | Age: 46
Setting detail: OUTPATIENT SURGERY
Discharge: HOME | End: 2021-12-06
Attending: INTERNAL MEDICINE | Admitting: INTERNAL MEDICINE
Payer: COMMERCIAL

## 2021-12-06 ENCOUNTER — ANESTHESIA (OUTPATIENT)
Dept: GASTROENTEROLOGY | Facility: MEDICAL CENTER | Age: 46
End: 2021-12-06

## 2021-12-06 ENCOUNTER — ANESTHESIA EVENT (OUTPATIENT)
Dept: GASTROENTEROLOGY | Facility: MEDICAL CENTER | Age: 46
End: 2021-12-06

## 2021-12-06 VITALS
SYSTOLIC BLOOD PRESSURE: 121 MMHG | HEIGHT: 60 IN | DIASTOLIC BLOOD PRESSURE: 65 MMHG | BODY MASS INDEX: 23.56 KG/M2 | WEIGHT: 120 LBS | OXYGEN SATURATION: 100 % | RESPIRATION RATE: 20 BRPM | TEMPERATURE: 98.2 F | HEART RATE: 75 BPM

## 2021-12-06 DIAGNOSIS — K22.70 BARRETT'S ESOPHAGUS WITHOUT DYSPLASIA: ICD-10-CM

## 2021-12-06 DIAGNOSIS — K21.9 GASTROESOPHAGEAL REFLUX DISEASE, UNSPECIFIED WHETHER ESOPHAGITIS PRESENT: ICD-10-CM

## 2021-12-06 LAB
EXT PREGNANCY TEST URINE: NEGATIVE
EXT. CONTROL: NORMAL

## 2021-12-06 PROCEDURE — 88313 SPECIAL STAINS GROUP 2: CPT | Performed by: PATHOLOGY

## 2021-12-06 PROCEDURE — 88305 TISSUE EXAM BY PATHOLOGIST: CPT | Performed by: PATHOLOGY

## 2021-12-06 PROCEDURE — 43239 EGD BIOPSY SINGLE/MULTIPLE: CPT | Performed by: INTERNAL MEDICINE

## 2021-12-06 PROCEDURE — 81025 URINE PREGNANCY TEST: CPT | Performed by: ANESTHESIOLOGY

## 2021-12-06 RX ORDER — LIDOCAINE HYDROCHLORIDE 20 MG/ML
INJECTION, SOLUTION EPIDURAL; INFILTRATION; INTRACAUDAL; PERINEURAL AS NEEDED
Status: DISCONTINUED | OUTPATIENT
Start: 2021-12-06 | End: 2021-12-06

## 2021-12-06 RX ORDER — PROPOFOL 10 MG/ML
INJECTION, EMULSION INTRAVENOUS AS NEEDED
Status: DISCONTINUED | OUTPATIENT
Start: 2021-12-06 | End: 2021-12-06

## 2021-12-06 RX ORDER — SODIUM CHLORIDE 9 MG/ML
125 INJECTION, SOLUTION INTRAVENOUS CONTINUOUS
Status: DISCONTINUED | OUTPATIENT
Start: 2021-12-06 | End: 2021-12-06

## 2021-12-06 RX ADMIN — PROPOFOL 50 MG: 10 INJECTION, EMULSION INTRAVENOUS at 11:55

## 2021-12-06 RX ADMIN — LIDOCAINE HYDROCHLORIDE 60 MG: 20 INJECTION, SOLUTION EPIDURAL; INFILTRATION; INTRACAUDAL; PERINEURAL at 11:51

## 2021-12-06 RX ADMIN — SODIUM CHLORIDE 125 ML/HR: 0.9 INJECTION, SOLUTION INTRAVENOUS at 11:13

## 2021-12-06 RX ADMIN — PROPOFOL 150 MG: 10 INJECTION, EMULSION INTRAVENOUS at 11:51

## 2021-12-24 DIAGNOSIS — K31.A0 INTESTINAL METAPLASIA OF GASTRIC MUCOSA: ICD-10-CM

## 2021-12-24 DIAGNOSIS — G43.009 MIGRAINE WITHOUT AURA AND WITHOUT STATUS MIGRAINOSUS, NOT INTRACTABLE: ICD-10-CM

## 2021-12-25 RX ORDER — AMITRIPTYLINE HYDROCHLORIDE 10 MG/1
TABLET, FILM COATED ORAL
Qty: 180 TABLET | Refills: 3 | Status: SHIPPED | OUTPATIENT
Start: 2021-12-25

## 2021-12-26 RX ORDER — OMEPRAZOLE 40 MG/1
CAPSULE, DELAYED RELEASE ORAL
Qty: 90 CAPSULE | Refills: 3 | Status: SHIPPED | OUTPATIENT
Start: 2021-12-26

## 2022-01-25 ENCOUNTER — TELEPHONE (OUTPATIENT)
Dept: NEUROLOGY | Facility: CLINIC | Age: 47
End: 2022-01-25

## 2022-01-25 ENCOUNTER — TELEPHONE (OUTPATIENT)
Dept: OBGYN CLINIC | Facility: HOSPITAL | Age: 47
End: 2022-01-25

## 2022-01-25 NOTE — TELEPHONE ENCOUNTER
Dr Henrry Archibald lab is asking what diagnosis should be used for the Vitamin D, Insurance wont accept any of the ones listed in the office visit notes       Juliann Palacio from [address] [place] [address],[address] # [phone]  8-12

## 2022-01-26 NOTE — TELEPHONE ENCOUNTER
Please look into what diagnoses would be covered for Vit   D level for patient's insurance and let me know, and then let [facility] lab know

## 2022-01-27 NOTE — TELEPHONE ENCOUNTER
Patrick Alba from [facility] called back to confirm that the dx code given does work

## 2022-01-28 NOTE — TELEPHONE ENCOUNTER
Call [facility] with spoke  Catie Arias the rep, I told her I was call to  schedule the patient Botox delivery, As per Catie Arias, the patient consent is required before shipment  Once completed they will give us a call to schedule the patient delivery

## 2022-02-03 NOTE — TELEPHONE ENCOUNTER
Botox  200 units is schedule for delivery on 2/8/2022  VIA [employer] [facility]    Please inform of delivery     This medication is for the [facility] location

## 2022-02-08 NOTE — TELEPHONE ENCOUNTER
Botox number of units: 200 units  Botox quantity: 1  Arrived at what location: [address]  Botox at Correct Administering Location: No, needs to be [facility] number: [id number]  Lot number: [id number]  Expiration Date: 10/01/2024  Appt notes indicate correct medication: Yes      Called Courmary jane to have medication be transported to the [facility]

## 2022-02-09 ENCOUNTER — TELEPHONE (OUTPATIENT)
Dept: NEUROLOGY | Facility: CLINIC | Age: 47
End: 2022-02-09

## 2022-02-09 NOTE — TELEPHONE ENCOUNTER
Patient will be out of town March 1 when her botox is scheduled, would you please reach out to patient and reschedule her botox?  Best call back number [phone]

## 2022-02-15 ENCOUNTER — TELEPHONE (OUTPATIENT)
Dept: NEUROLOGY | Facility: CLINIC | Age: 47
End: 2022-02-15

## 2022-02-15 NOTE — TELEPHONE ENCOUNTER
Called pt to reschedule her botox appointment due to her not being able to make the March 1st appointment  No answer, left voicemail to call back

## 2022-02-21 ENCOUNTER — TELEPHONE (OUTPATIENT)
Dept: NEUROLOGY | Facility: CLINIC | Age: 47
End: 2022-02-21

## 2022-02-24 ENCOUNTER — TELEPHONE (OUTPATIENT)
Dept: NEUROLOGY | Facility: CLINIC | Age: 47
End: 2022-02-24

## 2022-02-24 NOTE — TELEPHONE ENCOUNTER
Patient had called to reschedule botox appt because of a work trip  It was cancelled so she called back to get her 3/1/22 @ 9;00 spot if still available  It was so I rescheduled her for her original appt

## 2022-03-01 ENCOUNTER — PROCEDURE VISIT (OUTPATIENT)
Dept: NEUROLOGY | Facility: CLINIC | Age: 47
End: 2022-03-01
Payer: COMMERCIAL

## 2022-03-01 VITALS
HEART RATE: 92 BPM | TEMPERATURE: 96 F | BODY MASS INDEX: 22.08 KG/M2 | HEIGHT: 62 IN | DIASTOLIC BLOOD PRESSURE: 64 MMHG | SYSTOLIC BLOOD PRESSURE: 116 MMHG | WEIGHT: 120 LBS

## 2022-03-01 DIAGNOSIS — G43.709 CHRONIC MIGRAINE WITHOUT AURA WITHOUT STATUS MIGRAINOSUS, NOT INTRACTABLE: Primary | ICD-10-CM

## 2022-03-01 PROCEDURE — 64615 CHEMODENERV MUSC MIGRAINE: CPT | Performed by: PHYSICIAN ASSISTANT

## 2022-03-01 NOTE — PROGRESS NOTES
Universal Protocol   Consent: Verbal consent obtained  Written consent obtained    Risks and benefits: risks, benefits and alternatives were discussed  Consent given by: patient  Patient understanding: patient states understanding of the procedure being performed  Patient consent: the patient's understanding of the procedure matches consent given  Procedure consent: procedure consent matches procedure scheduled        Chemodenervation     Date/Time 3/1/2022 8:57 AM     Performed by  Christina Tan PA-C     Authorized by Christina Tan PA-C        Pre-procedure details      Prepped With: Alcohol     Procedure details     Position:  Upright   Botox     Botox Type:  Type A    Brand:  Botox    mL's of Botulinum Toxin:  200    Final Concentration per CC:  100 units    Needle Gauge:  30 G 2 5 inch   Procedures     Botox Procedures: chronic headache      Indications: migraines     Injection Location      Head / Face:  L superior trapezius, R superior trapezius, L superior cervical paraspinal, R superior cervical paraspinal, L , R , procerus, L temporalis, R temporalis, R frontalis, L frontalis, R medial occipitalis and L medial occipitalis    L  injection amount:  5 unit(s)    R  injection amount:  5 unit(s)    L lateral frontalis:  5 unit(s)    R lateral frontalis:  5 unit(s)    L medial frontalis:  2 5 unit(s)    R medial frontalis:  2 5 unit(s)    Comments:  Fewer units    L temporalis injection amount:  20 unit(s)    R temporalis injection amount:  20 unit(s)    Procerus injection amount:  5 unit(s)    L medial occipitalis injection amount:  15 unit(s)    R medial occipitalis injection amount:  15 unit(s)    L superior cervical paraspinal injection amount:  10 unit(s)    R superior cervical paraspinal injection amount:  10 unit(s)    L superior trapezius injection amount:  15 unit(s)    R superior trapezius injection amount:  15 unit(s)   Total Units     Total units used: 200    Total units discarded:  0   Post-procedure details      Chemodenervation:  Chronic migraine    Facial Nerve Location[de-identified]  Bilateral facial nerve    Patient tolerance of procedure: Tolerated well, no immediate complications   Comments      Extra units medically necessary:  L SCM 10  R SCM 5  Parietal regions b/l- 10 each side (20 total)  Orbicularis oculi bilaterally- 2 5 each side (5 total)  10 units between both cervical paraspinals (R>L)       Blood pressure 116/64, pulse 92, temperature (!) 96 °F (35 6 °C), temperature source Temporal, height 5' 1 52" (1 563 m), weight 54 4 kg (120 lb)  Fewer in the frontalis as noted due to ptosis in past and pt reported discomfort in tone/ feeling of frontalis muscles  Will consider TPIs on an emergent basis if needed  Ice pack provided before and after injections to prevent pain/ swelling at injection sites

## 2022-03-08 ENCOUNTER — TELEPHONE (OUTPATIENT)
Dept: NEUROLOGY | Facility: CLINIC | Age: 47
End: 2022-03-08

## 2022-03-08 NOTE — TELEPHONE ENCOUNTER
Called and spoke with patient to inform her that her 6/1/22 and 7/6/22 appointment were not longer in the [facility] office with [facility], they will be in the [facility] office with [facility]

## 2022-05-03 ENCOUNTER — OFFICE VISIT (OUTPATIENT)
Dept: OBGYN CLINIC | Facility: MEDICAL CENTER | Age: 47
End: 2022-05-03
Payer: COMMERCIAL

## 2022-05-03 ENCOUNTER — APPOINTMENT (OUTPATIENT)
Dept: RADIOLOGY | Facility: MEDICAL CENTER | Age: 47
End: 2022-05-03
Payer: COMMERCIAL

## 2022-05-03 VITALS
HEIGHT: 60 IN | WEIGHT: 128 LBS | BODY MASS INDEX: 25.13 KG/M2 | SYSTOLIC BLOOD PRESSURE: 110 MMHG | HEART RATE: 83 BPM | DIASTOLIC BLOOD PRESSURE: 75 MMHG

## 2022-05-03 DIAGNOSIS — M54.50 LOW BACK PAIN, UNSPECIFIED BACK PAIN LATERALITY, UNSPECIFIED CHRONICITY, UNSPECIFIED WHETHER SCIATICA PRESENT: ICD-10-CM

## 2022-05-03 DIAGNOSIS — M54.50 ACUTE RIGHT-SIDED LOW BACK PAIN WITHOUT SCIATICA: Primary | ICD-10-CM

## 2022-05-03 DIAGNOSIS — R20.2 RIGHT LEG PARESTHESIAS: ICD-10-CM

## 2022-05-03 PROCEDURE — 1036F TOBACCO NON-USER: CPT | Performed by: STUDENT IN AN ORGANIZED HEALTH CARE EDUCATION/TRAINING PROGRAM

## 2022-05-03 PROCEDURE — 99204 OFFICE O/P NEW MOD 45 MIN: CPT | Performed by: STUDENT IN AN ORGANIZED HEALTH CARE EDUCATION/TRAINING PROGRAM

## 2022-05-03 PROCEDURE — 3008F BODY MASS INDEX DOCD: CPT | Performed by: STUDENT IN AN ORGANIZED HEALTH CARE EDUCATION/TRAINING PROGRAM

## 2022-05-03 PROCEDURE — 72100 X-RAY EXAM L-S SPINE 2/3 VWS: CPT

## 2022-05-03 RX ORDER — METHYLPREDNISOLONE 4 MG/1
TABLET ORAL
Qty: 1 EACH | Refills: 0 | Status: SHIPPED | OUTPATIENT
Start: 2022-05-03

## 2022-05-03 NOTE — PROGRESS NOTES
1  Acute right-sided low back pain without sciatica  XR spine lumbar 2 or 3 views injury    methylPREDNISolone 4 MG tablet therapy pack   2  Right leg paresthesias  methylPREDNISolone 4 MG tablet therapy pack     Orders Placed This Encounter   Procedures    XR spine lumbar 2 or 3 views injury        Imaging Studies (I personally reviewed images in PACS and report):     X-ray lumbar spine 5/3/2022:  No acute osseous abnormalities  Alignment unremarkable  Multilevel ventral osteophytosis  IMPRESSION:   Acute midline and paralumbar back pain w/o radiculopathy; +reported RLE paresthesias   Reported h/o "disc herniations/tears"   Neurologically intact on exam today; denies bowel/bladder incontinence    PLAN:     Clinical exam and radiographic imaging reviewed with patient today, with impression as per above  I have discussed with the patient the pathophysiology of this diagnosis and reviewed how the examination correlates with this diagnosis   Imaging obtained of her lumbar spine today given her history  X-ray read as noted above and I will follow-up official radiology interpretation   Regards to pain control I prescribed her a Medrol Dosepak today  I recommended not concurrently taking NSAIDs while on Medrol but could take acetaminophen as well as her Robaxin at night   I did offer referral to formal physical therapy as well for further treatment, but patient prefers do home exercises which were provided today  I counseled daily adherence to home exercises to help facilitate recovery  Return in about 2 weeks (around 5/17/2022), or if symptoms worsen or fail to improve  Portions of the record may have been created with voice recognition software  Occasional wrong word or "sound a like" substitutions may have occurred due to the inherent limitations of voice recognition software  Read the chart carefully and recognize, using context, where substitutions have occurred       CHIEF COMPLAINT:  Chief Complaint   Patient presents with    Lower Back - Pain         HPI:  Jas Rust is a 40 y o  female  who presents for       Visit 5/3/2022 :  Initial evaluation of low back pain:  Of note patient states she has a history of a disc herniation/tear of her low back in the past     Her most recent exacerbation of pain occurred approximately 2 weeks ago from gardening  She attempted to treat on her own conservatively but noticed that the pain was progressively worsening as she was able to tolerate running as well as lifting  She reports pain is in the midline/paralumbar aspects of her back does not radiate down her lower extremities  She reports intermittent RLE tingling/numbness but denies significant weakness, balance issues  She denies any bowel/bladder incontinence  She describes as an aching/type pain of mild to moderate intensity  She states this pain does not interfere with her sleep at night  She states the pain does interfere with her activities of daily living  She has been taking NSAIDs, acetaminophen, muscle relaxers with limited relief           Medical, Surgical, Family, and Social History    Past Medical History:   Diagnosis Date    Allergic rhinitis 4/7/2015    Anal fissure     Anxiety     Batres's esophagus without dysplasia 5/20/2020    Family history of colon cancer in mother 10/19/2018    Gastritis 1/8/2020    GERD (gastroesophageal reflux disease)     History of COVID-19 12/2020    Migraine without aura and without status migrainosus, not intractable 5/17/2019    Reactive arthritis ([facility] [address] ) 4/29/2021     Past Surgical History:   Procedure Laterality Date    COLONOSCOPY  07/2019    HEMORRHOID SURGERY      AL REMOVAL ANAL FISTULA,SUBCUTANEOUS N/A 3/26/2020    Procedure: FISTULOTOMY;  Surgeon: Lidia Nina MD;  [facility];  Service: Colorectal    AL SURG DIAGNOSTIC EXAM, ANORECTAL N/A 3/26/2020    Procedure: EXAM UNDER ANESTHESIA (EUA), ENDOANAL ULTRASOUND;  Surgeon: Abdon Lovett MD;  [facility];  Service: Colorectal    UPPER GASTROINTESTINAL ENDOSCOPY  2019    VARICOSE VEIN SURGERY       Social History   Social History     Substance and Sexual Activity   Alcohol Use Yes    Comment: Socially      Social History     Substance and Sexual Activity   Drug Use No     Social History     Tobacco Use   Smoking Status Former Smoker    Types: Cigarettes    Quit date:     Years since quittin 3   Smokeless Tobacco Never Used     Family History   Problem Relation Age of Onset    Colon cancer Mother     Coronary artery disease Maternal Grandmother     Coronary artery disease Paternal Grandmother     Hypertension Family     Colon cancer Maternal Grandfather     Hypertension Father     Alcohol abuse Father      Allergies   Allergen Reactions    Sulfamethoxazole-Trimethoprim Swelling, Rash and Anaphylaxis     Joint pain, neck swelling, rash    Pollen Extract Headache          Physical Exam  /75   Pulse 83   Ht 5' (1 524 m)   Wt 58 1 kg (128 lb)   BMI 25 00 kg/m²     Constitutional:  see vital signs  Gen: well-developed, normocephalic/atraumatic, well-groomed  Eyes: No inflammation or discharge of conjunctiva or lids; sclera clear   Pharynx: no inflammation, lesion, or mass of lips  Neck: supple, no masses, non-distended  MSK: no inflammation, lesion, mass, or clubbing of nails and digits except for other than mentioned below  SKIN: no visible rashes or skin lesions  Pulmonary/Chest: Effort normal  No respiratory distress         Ortho Exam  BACK EXAM:  Gait: normal, no trendelenberg gait, no antalgic gait    BACK TENDERNESS:  Spinous Processes: no  Paraspinal Muscles: +b/l paralumbar  SI Joint: no  Sacrum: no    ROM:  Flexion: 80  Extension: 20  Lateral flexion: 20 b/l  Rotation: 20 b/l    DERMATOMAL SENSATION:  L1: normal   L2: normal   L3: normal   L4: normal   L5: normal   S1: normal    STRENGTH (bilateral):  Knee Extension: 5/5  Knee Flexion: 5/5  Foot Dorsiflexion: 5/5  Great Toe Extension: 5/5  Foot Plantarflexion: 5/5  Hip Flexion: 5/5    REFLEXES:  Patellar: 2+ bilateral  Achilles: 2+ bilateral  Clonus: negative bilateral    BACK:   SUPINE STRAIGHT LEG: negative    HIP:  LOG ROLL: negative  LEATHA: negative  FADIR: negative      Procedures

## 2022-05-23 NOTE — TELEPHONE ENCOUNTER
Scheduled delivery  Botox 200 units   Delivered 5/25/22  To [facility]  Via [employer]    Please advise if medication doesn't arrive  Spoke to [employer] at [facility] scheduled delivery

## 2022-05-25 NOTE — TELEPHONE ENCOUNTER
Botox number of units: 200 units  Botox quantity: 1  Arrived at what [facility]  Botox at Correct Administering Location: Yes  [address] number: 1342-8676-46  Lot number: [id number]  Expiration Date: 12/01/2024  Appt notes indicate correct medication: Yes

## 2022-06-01 ENCOUNTER — PROCEDURE VISIT (OUTPATIENT)
Dept: NEUROLOGY | Facility: CLINIC | Age: 47
End: 2022-06-01
Payer: COMMERCIAL

## 2022-06-01 VITALS
BODY MASS INDEX: 25.13 KG/M2 | HEART RATE: 82 BPM | WEIGHT: 128 LBS | SYSTOLIC BLOOD PRESSURE: 132 MMHG | HEIGHT: 60 IN | DIASTOLIC BLOOD PRESSURE: 61 MMHG | TEMPERATURE: 98.2 F

## 2022-06-01 DIAGNOSIS — G43.709 CHRONIC MIGRAINE WITHOUT AURA WITHOUT STATUS MIGRAINOSUS, NOT INTRACTABLE: Primary | ICD-10-CM

## 2022-06-01 PROCEDURE — 64615 CHEMODENERV MUSC MIGRAINE: CPT | Performed by: PHYSICIAN ASSISTANT

## 2022-06-01 PROCEDURE — 3008F BODY MASS INDEX DOCD: CPT | Performed by: STUDENT IN AN ORGANIZED HEALTH CARE EDUCATION/TRAINING PROGRAM

## 2022-06-01 NOTE — PROGRESS NOTES
Universal Protocol   Consent: Verbal consent obtained  Written consent obtained    Risks and benefits: risks, benefits and alternatives were discussed  Consent given by: patient  Patient understanding: patient states understanding of the procedure being performed  Patient consent: the patient's understanding of the procedure matches consent given  Procedure consent: procedure consent matches procedure scheduled        Chemodenervation     Date/Time 6/1/2022 2:07 PM     Performed by  Prasanth Kim PA-C     Authorized by Prasanth Kim PA-C        Pre-procedure details      Prepped With: Alcohol     Procedure details     Position:  Upright   Botox     Botox Type:  Type A    Brand:  Botox    mL's of Botulinum Toxin:  200    Final Concentration per CC:  100 units    Needle Gauge:  30 G 2 5 inch   Procedures     Botox Procedures: chronic headache      Indications: migraines     Injection Location      Head / Face:  L superior trapezius, R superior trapezius, L superior cervical paraspinal, R superior cervical paraspinal, L , R , procerus, L temporalis, R temporalis, R frontalis, L frontalis, R medial occipitalis and L medial occipitalis    L  injection amount:  5 unit(s)    R  injection amount:  5 unit(s)    L lateral frontalis:  5 unit(s)    R lateral frontalis:  5 unit(s)    L medial frontalis:  2 5 unit(s)    R medial frontalis:  2 5 unit(s)    Comments:  Fewer units, medically necessary    L temporalis injection amount:  20 unit(s)    R temporalis injection amount:  20 unit(s)    Procerus injection amount:  5 unit(s)    L medial occipitalis injection amount:  15 unit(s)    R medial occipitalis injection amount:  15 unit(s)    L superior cervical paraspinal injection amount:  10 unit(s)    R superior cervical paraspinal injection amount:  10 unit(s)    L superior trapezius injection amount:  15 unit(s)    R superior trapezius injection amount:  15 unit(s)   Total Units Total units used:  200    Total units discarded:  0   Post-procedure details      Chemodenervation:  Chronic migraine    Facial Nerve Location[de-identified]  Bilateral facial nerve    Patient tolerance of procedure: Tolerated well, no immediate complications   Comments      Extra units medically necessary:  L SCM 10  R SCM 5  Parietal regions b/l- 10 each side (20 total)  Orbicularis oculi bilaterally- 2 5 each side (5 total)  10 units between both cervical paraspinals (R>L)       Blood pressure 132/61, pulse 82, temperature 98 2 °F (36 8 °C), temperature source Temporal, height 5' (1 524 m), weight 58 1 kg (128 lb)  Last injection 3 months ago went well  Will attempt to replicate last injection

## 2022-06-27 ENCOUNTER — TELEPHONE (OUTPATIENT)
Dept: NEUROLOGY | Facility: CLINIC | Age: 47
End: 2022-06-27

## 2022-07-06 ENCOUNTER — OFFICE VISIT (OUTPATIENT)
Dept: NEUROLOGY | Facility: CLINIC | Age: 47
End: 2022-07-06
Payer: COMMERCIAL

## 2022-07-06 VITALS
TEMPERATURE: 97.2 F | DIASTOLIC BLOOD PRESSURE: 81 MMHG | SYSTOLIC BLOOD PRESSURE: 111 MMHG | HEIGHT: 60 IN | BODY MASS INDEX: 24.91 KG/M2 | HEART RATE: 80 BPM | WEIGHT: 126.9 LBS

## 2022-07-06 DIAGNOSIS — G43.109 MIGRAINE WITH AURA AND WITHOUT STATUS MIGRAINOSUS, NOT INTRACTABLE: ICD-10-CM

## 2022-07-06 DIAGNOSIS — G43.709 CHRONIC MIGRAINE WITHOUT AURA WITHOUT STATUS MIGRAINOSUS, NOT INTRACTABLE: Primary | ICD-10-CM

## 2022-07-06 DIAGNOSIS — G24.3 CERVICAL DYSTONIA: ICD-10-CM

## 2022-07-06 DIAGNOSIS — H20.9 UVEITIS: ICD-10-CM

## 2022-07-06 DIAGNOSIS — M79.18 MYOFASCIAL PAIN: ICD-10-CM

## 2022-07-06 PROCEDURE — 99214 OFFICE O/P EST MOD 30 MIN: CPT | Performed by: PHYSICIAN ASSISTANT

## 2022-07-06 NOTE — PROGRESS NOTES
Patient ID: Kavya Ellis is a 40 y o  female  Assessment/Plan:       Diagnoses and all orders for this visit:    Chronic migraine without aura without status migrainosus, not intractable    Cervical dystonia    Myofascial pain    Migraine with aura and without status migrainosus, not intractable    Uveitis          Continue Botox every 90 days for the prevention of chronic migraine headaches, significantly beneficial for this and neck pain/cervical dystonia  Trigger point injections will be performed on an emergent basis per patient request     Can continue methocarbamol 500 mg q h s  P r n  Neck pain or tension headache  Continue amitriptyline 20 mg q h s  For migraine prevention  In the past it was documented that she had more migraine headaches when she weaned this  She would still like to try coming off of this again; after her vacation will decrease to 10 mg x3-4 weeks, then stop  If HAs worsen during weaning process, resume lowest effective dose  P r n  Migraine onset okay to take ibuprofen or Tylenol, no more than 3 doses of any analgesic over-the-counter to prevent medication overuse headache  She will consider [place] after discussion of this today  With recent diagnosis of uveitis, and? 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  She was diagnosed with anterior uveitis in May 2022 and was given a prednisone taper, all labs unremarkable including HLA B27, Ace, CRP and sed rate    Prior to that blood work in December 2021 with a unremarkable ABISAI, TSH, CC P, sabrina autoantibody profile, sed rate, vitamin-D, RF, CRP  Eye sxs are improved  When I saw her in the past, last August 2021, trigger point injections were effective for myofascial pain  These were done on an emergent basis  She continues Botox Q 90 days for chronic migraine headaches  No side effects  She states she typically gets 1 migraine every 3 months after starting Botox  Since starting botox, the patient reports greater than 7 days of migraine relief from baseline, correlated with headache diary, decreased abortive medication use and decreased ER visits  Past medical history:  Anxiety  Varicose veins of the leg  GERD  Colitis/it will bowel syndrome  Neck pain/cervical dystonia     Any family history of migraines? No  Any family history of aneurysms? No     Have you seen someone else for headaches or pain? Yes [facility] and PCP     Headaches:   What medications do you take or have you taken for your headaches? Preventive therapy:   - magnesium, B complex, vitamin-D, Turmeric  - amitriptyline 20 mg  - Robaxin 750, Flexeril  Abortive Therapy:   - Ativan  - tylenol  - ibuprofen (Advil, Motrin), naproxen  - Imitrex, migranal  - Maxalt     Headaches started at what age?  8th grade and started her menstruation    How often do the headaches occur? 1 a month  What time of the day do the headaches start?  Varies  How long do the headaches last?  1 day on average     Are you ever headache free? Yes     Aura/Warning and how long does it last ? Flashing light in the peripheral vision and this would last for 40 minutes and then headache started       Describe your usual headache?  Throbbing, pressure, burning, shooting, electrical, stabbing   Patient does state that the stabbing pain that she has is throughout her right occipital parietal and frontal region    Where is your headache located?  Typically locked on the right side of her head and face  What is the intensity of pain?  Pain can get up to 10/10     Associated symptoms:   Decreased appetite, nausea  Phonophobic and sensitivity to smells  Problems with concentration  Stiff or sore neck, hands or feet tingle or feel numb  Unable to work, prefer a dark and quiet room     Headache are worse if the patient: cough, sneeze, bending over, exertion  Headache triggers:  Lack of sleep will bring on neck pain and exertion  What time of the year do headaches occur more frequently? do not seem to be related to any time of day or year     Have you had trigger point injection performed and how often? Yes  Have you had Botox injection performed and how often? yes   Have you had epidural injections or transforaminal injections performed? No     Alternative therapies used in the past for headaches? physical therapy  Have you used CBD or THC for your headaches and how often? Yes has   How many caffeine products to drink a day? How much water to drink a day?     Are you current pregnant or planning on getting pregnant? No     Prior notes:  Benign positional vertigo:  Diagnosed with BPV and told to do PT but did not go for it at the time  Has not had this since feb  It is random as to when it occurs  First time she had this was when she was home and turned around and lasted for seconds but then had a headaches after that  And this hfappened twice in jan 2019 and both time she was moving suddenly and did have a headache with this       Neck pain/cervical dystonia:  What is the intensity of your pain? 2/10 at this time  When did the neck pain start?  June of 2018  Where is the pain located? Right side and goes into the right neck and shoulder region  It does not shoot down her arm but goes up her face and head  She has noted that the first tow teeth in the front feel numb with this  This has improved with the first round of Botox  Have you noticed decreased range of movement in your neck?  Now has marked improvement since Botox  Does your neck pain cause you to have headaches? yes  At what time of the day is your neck pain:  - Worst: there is no specific time of the day and depends on her posture and how her movements have been  Has been standing or exertion  Then pain is much worse  - Best:morning  Is your neck pain associated with: none  What aggravates neck pain? Not sure  What alleviates neck pain? hot or cold packs, cbd cream, PT for her neck        Open air MRI brain 9/26/18 w/o- normal        The following portions of the patient's history were reviewed and updated as appropriate:   She  has a past medical history of Allergic rhinitis (4/7/2015), Anal fissure, Anxiety, Batres's esophagus without dysplasia (5/20/2020), Family history of colon cancer in mother (10/19/2018), Gastritis (1/8/2020), GERD (gastroesophageal reflux disease), History of COVID-19 (12/2020), Migraine without aura and without status migrainosus, not intractable (5/17/2019), and Reactive arthritis ([facility] [address] ) (4/29/2021)  She   Patient Active Problem List    Diagnosis Date Noted    Migraine with aura and without status migrainosus, not intractable 07/18/2022    Uveitis 07/18/2022    Chronic migraine without aura without status migrainosus, not intractable 11/30/2021    Myofascial pain 08/12/2021    Reactive arthritis ([facility] [address] ) 04/29/2021    Batres's esophagus without dysplasia 05/20/2020    Gastroesophageal reflux disease 05/20/2020    Cervical dystonia 05/17/2019    Migraine without aura and without status migrainosus, not intractable 05/17/2019    Family history of colon cancer in mother 10/19/2018    Generalized anxiety disorder 09/10/2018     She  has a past surgical history that includes Varicose vein surgery; Hemorrhoid surgery; Colonoscopy (07/2019); Upper gastrointestinal endoscopy (07/2019); pr surg diagnostic exam, anorectal (N/A, 3/26/2020); and pr removal anal fistula,subcutaneous (N/A, 3/26/2020)    Her family history includes Alcohol abuse in her father; Colon cancer in her maternal grandfather and mother; Coronary artery disease in her maternal grandmother and paternal grandmother; Hypertension in her family and father  She  reports that she quit smoking about 17 years ago  Her smoking use included cigarettes  She has never used smokeless tobacco  She reports current alcohol use  She reports that she does not use drugs  Current Outpatient Medications   Medication Sig Dispense Refill    amitriptyline (ELAVIL) 10 mg tablet TAKE 2 TABLETS DAILY AT BEDTIME 180 tablet 3    Diclofenac Sodium (VOLTAREN) 1 % Apply 2 g topically 4 (four) times a day 350 g 5    Ivermectin (Soolantra) 1 % CREA Apply topically as needed       methocarbamol (ROBAXIN) 500 mg tablet 1 tab qhs rn neck pain 30 tablet 0    metroNIDAZOLE (METROCREAM) 0 75 % cream APPLY TO FACE TWICE A DAY      omeprazole (PriLOSEC) 40 MG capsule TAKE 1 CAPSULE DAILY BEFORE BREAKFAST (NEEDS APPOINTMENT, NEEDS TO CALL TO SCHEDULE FOR FURTHER REFILLS) 90 capsule 3    OnabotulinumtoxinA (BOTOX IJ) Inject as directed For Migraines by neurology      celecoxib (CeleBREX) 100 mg capsule Take 1 capsule (100 mg total) by mouth 2 (two) times a day (Patient not taking: Reported on 7/6/2022) 60 capsule 3    LORazepam (ATIVAN) 0 5 mg tablet Take 1 tablet (0 5 mg total) by mouth daily as needed for anxiety 15 tablet 0    meloxicam (MOBIC) 15 mg tablet Take 1 tablet (15 mg total) by mouth daily (Patient not taking: Reported on 5/24/2021) 30 tablet 5    methylPREDNISolone 4 MG tablet therapy pack Use as directed on package (Patient not taking: Reported on 7/6/2022) 1 each 0     No current facility-administered medications for this visit  She is allergic to sulfamethoxazole-trimethoprim and pollen extract            Objective:    Blood pressure 111/81, pulse 80, temperature (!) 97 2 °F (36 2 °C), temperature source Temporal, height 5' (1 524 m), weight 57 6 kg (126 lb 14 4 oz)  Body mass index is 24 78 kg/m²      Physical Exam    Neurological Exam  On neurologic exam, the patient is alert and oriented to time and place  Speech is fluent and articulate, and the patient follows commands appropriately  Judgment and affect appear normal  Pupils are equally round and reactive to light and extraocular muscles are intact without nystagmus  Face is symmetric, and tongue, uvula, and palate are midline  Hearing is intact  Motor examination reveals intact strength throughout  Normal gait is steady  ROS:    Review of Systems   Constitutional: Negative  Negative for appetite change and fever  HENT: Negative  Negative for hearing loss, tinnitus, trouble swallowing and voice change  Eyes: Negative  Negative for photophobia and pain  Respiratory: Negative  Negative for shortness of breath  Cardiovascular: Negative  Negative for palpitations  Gastrointestinal: Negative  Negative for nausea and vomiting  Endocrine: Negative  Negative for cold intolerance  Genitourinary: Negative  Negative for dysuria, frequency and urgency  Musculoskeletal: Negative  Negative for myalgias and neck pain  Skin: Negative  Negative for rash  Neurological: Negative  Negative for dizziness, tremors, seizures, syncope, facial asymmetry, speech difficulty, weakness, light-headedness, numbness and headaches  Hematological: Negative  Does not bruise/bleed easily  Psychiatric/Behavioral: Negative  Negative for confusion, hallucinations and sleep disturbance  The following portions of the patient's history were reviewed and updated as appropriate: allergies, current medications/ medication history, past family history, past medical history, past social history, past surgical history and problem list     Review of systems was reviewed and otherwise unremarkable from a neurological perspective

## 2022-07-06 NOTE — PATIENT INSTRUCTIONS
Oxana Leavitt copay card- online  Try to wean amitriptyline after vacation  Shaneka Vizcarra DO- rheumatology

## 2022-07-18 PROBLEM — G43.109 MIGRAINE WITH AURA AND WITHOUT STATUS MIGRAINOSUS, NOT INTRACTABLE: Status: ACTIVE | Noted: 2022-07-18

## 2022-07-18 PROBLEM — H20.9 UVEITIS: Status: ACTIVE | Noted: 2022-07-18

## 2022-08-11 DIAGNOSIS — G43.709 CHRONIC MIGRAINE WITHOUT AURA WITHOUT STATUS MIGRAINOSUS, NOT INTRACTABLE: Primary | ICD-10-CM

## 2022-08-11 RX ORDER — ONABOTULINUMTOXINA 200 [USP'U]/1
INJECTION, POWDER, LYOPHILIZED, FOR SOLUTION INTRADERMAL; INTRAMUSCULAR
Qty: 1 EACH | Refills: 3 | Status: SHIPPED | OUTPATIENT
Start: 2022-08-11

## 2022-08-18 ENCOUNTER — TELEPHONE (OUTPATIENT)
Dept: NEUROLOGY | Facility: CLINIC | Age: 47
End: 2022-08-18

## 2022-08-18 NOTE — TELEPHONE ENCOUNTER
Submitted authorization through [employer] to [facility] Case ID: [id number]  Auth-pending   Will check back for approval/denial

## 2022-08-23 NOTE — TELEPHONE ENCOUNTER
[address] spoke to Angela DORSEY to follow up on authorization submitted  Request under Medical was canceled and they were running the request under patients Pharmacy benefits  Botox is covered under patient's major medical benefits  Asked to have request reinstated     Pending reference # [id number]    Will await approval/ denial

## 2022-08-25 NOTE — TELEPHONE ENCOUNTER
[employer] contacted for approval/denial determination, rep Padmini Dover stated clinical files needed, advised that they had been previously sent but will send; all clinical notes needed resent to [facility]  Will follow up

## 2022-08-29 NOTE — TELEPHONE ENCOUNTER
Called [facility] spoke to Rachel Beebe to schedule delivery of Botox 200 units  Rachel Beebe stated patient needed a preauthorize and they were unable to locate the one on file for the patient  I was transferred to Deann Sloan in the insurance department and she asked that I fax the approval  [phone]  Authorization approval received from [facility] faxed to [address] for review  Will follow up again on status

## 2022-09-02 NOTE — TELEPHONE ENCOUNTER
[facility]ed [facility], spoke to Dawn ; pt Botox under insurance review  Will follow up as able for delivery

## 2022-09-06 ENCOUNTER — TELEPHONE (OUTPATIENT)
Dept: NEUROLOGY | Facility: CLINIC | Age: 47
End: 2022-09-06

## 2022-09-06 NOTE — TELEPHONE ENCOUNTER
Patient's insurance e-rejected for appt on 9/8  Called and spoke to the patient who offered that insurance changed as of 9/1  Explained the process of auth/ specialty pharmacy to the patient and rescheduled [place] [id number]  appt for 10/5  Botox savings information mailed to the patient's home  New insurance info:    [employer]  Member #: [id number]  Group #: [id number]  RX BIN: [id number]  RX PCN: [id number]  RX Group: [id number]  Pre-authorization #: [id number]    Advised the patient to bring her new insurance card to her next appt

## 2022-09-22 ENCOUNTER — TELEPHONE (OUTPATIENT)
Dept: NEUROLOGY | Facility: CLINIC | Age: 47
End: 2022-09-22

## 2022-09-22 NOTE — TELEPHONE ENCOUNTER
Called and spoke to Lisa Ayala, pharmacist at [facility] and provided verbal RX for:    Botox: 200 units   Refills: 3    Script is in the chart  Will follow up on this order

## 2022-09-26 ENCOUNTER — TELEPHONE (OUTPATIENT)
Dept: NEUROLOGY | Facility: CLINIC | Age: 47
End: 2022-09-26

## 2022-09-26 NOTE — TELEPHONE ENCOUNTER
Prior authorization initiated via availity for :    Botox: 200 units     Per availity prior authorization not required for CPT 98007    Determination pending      Will follow up as able

## 2022-09-26 NOTE — TELEPHONE ENCOUNTER
Spoke with Daren Fernandez at [place] [id number] who advised that prior authorization is needed for botox       Will initiate and follow up

## 2022-09-27 NOTE — TELEPHONE ENCOUNTER
Received call from [facility] [address] from [facility] in regards to patients Botox-Authorization status  I informed Julianne that Prior-Auth was initiated and approved however [facility] is not listed as the servicing provider  I provided Julianne with the approved auth information  I informed Julianne that I will reach out to Ariella to make her aware of this and for her to give Kj a call with an updated status on patients Botox order

## 2022-09-27 NOTE — TELEPHONE ENCOUNTER
Correction submitted to insurance to change service provider-please see authorization thread from 9/26

## 2022-09-27 NOTE — TELEPHONE ENCOUNTER
Determination retrieved from avidity:    Approved   Botox: 200 units   Auth #: [id number]  Valid: 9/26/2022 until 9/25/2023  4 visits    Servicing provider listed on the auth is the physician and not [facility]  Prior authorization modification submitted  Will follow up as able

## 2022-09-28 NOTE — TELEPHONE ENCOUNTER
Called and spoke Tianna Santiago at [facility] who stated that 2 scripts were received so the script needs to be re-processed  Patient rescue has been entered  Script will need to be processed and will take 24-48 hours  Per Geovanni Ayala the account will not go back into insurance review after the script is processed but that the patient will need to provide consent  Will continue to follow up on this order

## 2022-09-28 NOTE — TELEPHONE ENCOUNTER
Servicing provider now updated to [facility] on the auth    Faxed the auth to [facility] and placed at the  to be scanned in

## 2022-09-29 NOTE — TELEPHONE ENCOUNTER
Called and spoke to Royal Jaime at [facility] who advised that consent is needed for delivery of botox  Conference call completed with patient and Yazmin Castillo  Verbal consent was given for the life of the script  Set up delivery for:    Botox: 200 units  Qty:1   Delivery to [facility]  Scheduled: 9/4/2022      Please advise if medication does not arrive

## 2022-10-04 NOTE — TELEPHONE ENCOUNTER
Botox number of units: 200 units  Botox quantity: 1  Arrived at what location: [facility]  Botox at Correct Administering Location: Yes  [address] number: [id number]  Lot number: [id number]  Expiration Date: 03/01/2025  Appt notes indicate correct medication: Yes

## 2022-10-05 ENCOUNTER — PROCEDURE VISIT (OUTPATIENT)
Dept: NEUROLOGY | Facility: CLINIC | Age: 47
End: 2022-10-05
Payer: COMMERCIAL

## 2022-10-05 VITALS
BODY MASS INDEX: 24.74 KG/M2 | SYSTOLIC BLOOD PRESSURE: 131 MMHG | WEIGHT: 126 LBS | TEMPERATURE: 98.2 F | HEIGHT: 60 IN | HEART RATE: 94 BPM | DIASTOLIC BLOOD PRESSURE: 74 MMHG

## 2022-10-05 DIAGNOSIS — G43.709 CHRONIC MIGRAINE WITHOUT AURA WITHOUT STATUS MIGRAINOSUS, NOT INTRACTABLE: Primary | ICD-10-CM

## 2022-10-05 PROCEDURE — 64615 CHEMODENERV MUSC MIGRAINE: CPT | Performed by: PHYSICIAN ASSISTANT

## 2022-10-05 NOTE — PROGRESS NOTES
Universal Protocol   Consent: Verbal consent obtained  Written consent obtained    Risks and benefits: risks, benefits and alternatives were discussed  Consent given by: patient  Patient understanding: patient states understanding of the procedure being performed  Patient consent: the patient's understanding of the procedure matches consent given  Procedure consent: procedure consent matches procedure scheduled        Chemodenervation     Date/Time 10/5/2022 1:33 PM     Performed by  Jose De Jesus Bonner PA-C     Authorized by Jose De Jesus Bonner PA-C        Pre-procedure details      Prepped With: Alcohol     Procedure details     Position:  Upright   Botox     Botox Type:  Type A    Brand:  Botox    mL's of Botulinum Toxin:  200    Final Concentration per CC:  100 units    Needle Gauge:  30 G 2 5 inch   Procedures     Botox Procedures: chronic headache      Indications: migraines     Injection Location      Head / Face:  L superior trapezius, R superior trapezius, L superior cervical paraspinal, R superior cervical paraspinal, L , R , procerus, L temporalis, R temporalis, R frontalis, L frontalis, R medial occipitalis and L medial occipitalis    L  injection amount:  5 unit(s)    R  injection amount:  5 unit(s)    L lateral frontalis:  5 unit(s)    R lateral frontalis:  5 unit(s)    L medial frontalis:  2 5 unit(s)    R medial frontalis:  2 5 unit(s)    Comments:  Fewer units    L temporalis injection amount:  20 unit(s)    R temporalis injection amount:  20 unit(s)    Procerus injection amount:  5 unit(s)    L medial occipitalis injection amount:  15 unit(s)    R medial occipitalis injection amount:  15 unit(s)    L superior cervical paraspinal injection amount:  10 unit(s)    R superior cervical paraspinal injection amount:  10 unit(s)    L superior trapezius injection amount:  15 unit(s)    R superior trapezius injection amount:  15 unit(s)   Total Units     Total units used: 200    Total units discarded:  0   Post-procedure details      Chemodenervation:  Chronic migraine    Facial Nerve Location[de-identified]  Bilateral facial nerve    Patient tolerance of procedure: Tolerated well, no immediate complications   Comments      Extra units medically necessary:  L SCM 10  R SCM 5  Parietal regions b/l- 10 each side (20 total)  Orbicularis oculi bilaterally- 2 5 each side (5 total)  10 units between both cervical paraspinals (R>L)    Fewer units medial frontalis as above  Blood pressure 131/74, pulse 94, temperature 98 2 °F (36 8 °C), temperature source Temporal, height 5' (1 524 m), weight 57 2 kg (126 lb)

## 2022-10-14 DIAGNOSIS — G43.009 MIGRAINE WITHOUT AURA AND WITHOUT STATUS MIGRAINOSUS, NOT INTRACTABLE: ICD-10-CM

## 2022-10-14 RX ORDER — AMITRIPTYLINE HYDROCHLORIDE 10 MG/1
20 TABLET, FILM COATED ORAL
Qty: 180 TABLET | Refills: 3 | Status: SHIPPED | OUTPATIENT
Start: 2022-10-14

## 2022-12-07 ENCOUNTER — TELEPHONE (OUTPATIENT)
Dept: NEUROLOGY | Facility: CLINIC | Age: 47
End: 2022-12-07

## 2022-12-07 NOTE — TELEPHONE ENCOUNTER
Contacted [facility] to set up delivery of pt's Botox  Spoke to Casal Paivas (Shift lead) that advised that the Botox is ready to be delivered 12/20/2022  Botox 200 UNITS  Qty  1  Scheduled:12/20/2022  Location:[facility]  Sunny: Ups/[employer]    Please advise if Botox does not arrive  Thank you

## 2022-12-29 ENCOUNTER — TELEPHONE (OUTPATIENT)
Dept: NEUROLOGY | Facility: CLINIC | Age: 47
End: 2022-12-29

## 2023-01-05 ENCOUNTER — PROCEDURE VISIT (OUTPATIENT)
Dept: NEUROLOGY | Facility: CLINIC | Age: 48
End: 2023-01-05

## 2023-01-05 VITALS
TEMPERATURE: 97.6 F | BODY MASS INDEX: 24.61 KG/M2 | DIASTOLIC BLOOD PRESSURE: 62 MMHG | HEIGHT: 60 IN | SYSTOLIC BLOOD PRESSURE: 112 MMHG

## 2023-01-05 DIAGNOSIS — G43.709 CHRONIC MIGRAINE WITHOUT AURA WITHOUT STATUS MIGRAINOSUS, NOT INTRACTABLE: Primary | ICD-10-CM

## 2023-01-05 NOTE — PROGRESS NOTES
Universal Protocol   Consent: Verbal consent obtained  Written consent obtained    Risks and benefits: risks, benefits and alternatives were discussed  Consent given by: patient  Patient understanding: patient states understanding of the procedure being performed  Patient consent: the patient's understanding of the procedure matches consent given  Procedure consent: procedure consent matches procedure scheduled        Chemodenervation     Date/Time 1/5/2023 8:28 AM     Performed by  Ruma Rothman PA-C     Authorized by Ruma Rothman PA-C        Pre-procedure details      Prepped With: Alcohol     Procedure details     Position:  Upright   Botox     Botox Type:  Type A    Brand:  Botox    mL's of Botulinum Toxin:  200    Final Concentration per CC:  100 units    Needle Gauge:  30 G 2 5 inch   Procedures     Botox Procedures: chronic headache      Indications: migraines     Injection Location      Head / Face:  L superior trapezius, R superior trapezius, L superior cervical paraspinal, R superior cervical paraspinal, L , R , procerus, L temporalis, R temporalis, R frontalis, L frontalis, R medial occipitalis and L medial occipitalis    L  injection amount:  5 unit(s)    R  injection amount:  5 unit(s)    L lateral frontalis:  5 unit(s)    R lateral frontalis:  5 unit(s)    L medial frontalis:  2 5 unit(s)    R medial frontalis:  2 5 unit(s)    Comments:  Fewer units    L temporalis injection amount:  20 unit(s)    R temporalis injection amount:  20 unit(s)    Procerus injection amount:  5 unit(s)    L medial occipitalis injection amount:  15 unit(s)    R medial occipitalis injection amount:  15 unit(s)    L superior cervical paraspinal injection amount:  10 unit(s)    R superior cervical paraspinal injection amount:  10 unit(s)    L superior trapezius injection amount:  15 unit(s)    R superior trapezius injection amount:  15 unit(s)   Total Units     Total units used: 200    Total units discarded:  0   Post-procedure details      Chemodenervation:  Chronic migraine    Facial Nerve Location[de-identified]  Bilateral facial nerve    Patient tolerance of procedure: Tolerated well, no immediate complications   Comments      Extra units medically necessary:  L SCM 10  R SCM 5  Parietal regions b/l- 10 each side (20 total)  Orbicularis oculi bilaterally- 2 5 each side (5 total)  10 units between both cervical paraspinals (R>L)     Fewer units medial frontalis as above  Blood pressure 112/62, temperature 97 6 °F (36 4 °C), temperature source Temporal, height 5' (1 524 m)

## 2023-03-15 ENCOUNTER — TELEPHONE (OUTPATIENT)
Dept: NEUROLOGY | Facility: CLINIC | Age: 48
End: 2023-03-15

## 2023-03-15 NOTE — TELEPHONE ENCOUNTER
Botox number of units: 200 units  Botox quantity: 1  Arrived at what location: [facility]  Botox at Correct Administering Location: Yes  [address] number: [id number]  Lot number: [id number]  Expiration Date: 09/01/2025  Appt notes indicate correct medication: Yes

## 2023-04-05 NOTE — PROGRESS NOTES
[facility]    NAME: Alyssia Abreu  AGE: 56 y o  SEX: female  : 1975     DATE: 2023     Assessment and Plan:     Problem List Items Addressed This Visit    None  Visit Diagnoses     Annual physical exam    -  Primary    Relevant Orders    Lipid Panel with Direct LDL reflex    HEMOGLOBIN A1C W/ EAG ESTIMATION    Comprehensive metabolic panel    CBC and differential    TSH, 3rd generation with Free T4 reflex    Encounter for screening mammogram for malignant neoplasm of breast        Relevant Orders    Mammo screening bilateral w 3d & cad        - Satisfactory physical examination  - The USPSTF recommends biennial screening mammography for women aged 48 to 76 years  The decision to start screening mammography in women prior to age 48 years should be an individual one  Patient has agreed to undergo testing at this time  All questions were answered  - Patient up to date with colon cancer screening and cervical  Cancer screening    - The USPSTF recommends screening for prediabetes and type 2 diabetes in adults aged 28 to 79 years who are overweight or obese  Hemoglobin A1C and lipid panel ordered   BMI Counseling: Body mass index is 25 15 kg/m²  The BMI is above normal  Nutrition recommendations include 3-5 servings of fruits/vegetables daily, moderation in carbohydrate intake, increasing intake of lean protein, reducing intake of saturated fat and trans fat and reducing intake of cholesterol  Exercise recommendations include moderate aerobic physical activity for 150 minutes/week  Immunizations and preventive care screenings were discussed with patient today  Appropriate education was printed on patient's after visit summary             Return in about 1 year (around 2024) for Annual physical      Chief Complaint:     Chief Complaint   Patient presents with   • Establish Care     Was seen here 2 years a go with Dr Merrilee Phalen  History of Present Illness:     Adult Annual Physical   Ambrose  is a very pleasant 56year old female who presents today for a comprehensive physical exam  Overall she is doing well  She does have a history of possible reactive arthritis for which she was seen by Rheumatology in the past  She states that she has not had a flare up in the past few months  She continues to follow with [facility] after a case of uveitis and was last seen by them in August 2022  She does follow with Neurology for her migraines which are stable with Botox injections and Amitriptyline  She also follows with gastroenterology given her history of Batres's esophagus and her family history of colon cancer  She is frustrated as she has been gaining weight despite eating a healthy diet and exercising regularly  She does report that is adopting a new role at work which is somewhat stressful  Diet and Physical Activity  · Diet/Nutrition: well balanced diet  · Exercise: 5-7 times a week on average  Depression Screening  PHQ-2/9 Depression Screening    Little interest or pleasure in doing things: 0 - not at all  Feeling down, depressed, or hopeless: 0 - not at all  PHQ-2 Score: 0  PHQ-2 Interpretation: Negative depression screen       General Health  · Sleep: sleeps well  · Hearing: Tinnitus  · Vision: goes for regular eye exams  · Dental: regular dental visits  /GYN Health  · Patient is: perimenopausal  · Last menstrual period: No LMP recorded (lmp unknown)  · Contraceptive method: None  Review of Systems:     Review of Systems   Constitutional:        Weight gain    HENT: Negative  Respiratory: Negative  Cardiovascular: Negative  Gastrointestinal: Negative  Genitourinary: Negative  Musculoskeletal: Negative  Skin: Negative  Neurological: Positive for headaches  Psychiatric/Behavioral: Negative         Past Medical History:     Past Medical History:   Diagnosis Date   • Allergic rhinitis 2015   • Anal fissure    • Anxiety    • Batres's esophagus without dysplasia 2020   • Family history of colon cancer in mother 10/19/2018   • Gastritis 2020   • GERD (gastroesophageal reflux disease)    • History of COVID-19 2020   • Migraine without aura and without status migrainosus, not intractable 2019   • Reactive arthritis ([address] ) 2021      Past Surgical History:     Past Surgical History:   Procedure Laterality Date   • COLONOSCOPY  2019   • HEMORRHOID SURGERY     • MA ANRCT XM SURG REQ ANES GENERAL SPI/EDRL DX N/A 3/26/2020    Procedure: EXAM UNDER ANESTHESIA (EUA), ENDOANAL ULTRASOUND;  Surgeon: Rylie Quintanilla MD;  [facility]: [facility];  Service: Colorectal   • MA SURG TX ANAL FISTULA SUBQ N/A 3/26/2020    Procedure: FISTULOTOMY;  Surgeon: Rylie Quintanilla MD;  Location: [facility];  Service: Colorectal   • UPPER GASTROINTESTINAL ENDOSCOPY  2019   • VARICOSE VEIN SURGERY        Social History:     Social History     Socioeconomic History   • Marital status: /Civil Union     Spouse name: None   • Number of children: None   • Years of education: None   • Highest education level: None   Occupational History   • None   Tobacco Use   • Smoking status: Former     Types: Cigarettes     Quit date:      Years since quittin 2   • Smokeless tobacco: Never   Substance and Sexual Activity   • Alcohol use: Yes     Comment: Socially    • Drug use: No   • Sexual activity: None   Other Topics Concern   • None   Social History Narrative     since   Two children  14 and 10  Works in communications for [employer]   is a  for [employer]  16 yo with an eating disorder which started in spring 2020  5/21- completed an intensive out-patient program thru [facility]       Social Determinants of Health     Financial Resource Strain: Not on file   Food Insecurity: Not on file   Transportation Needs: Not on file   Physical Activity: Not on file   Stress: Not on file   Social Connections: Not on file   Intimate Partner Violence: Not on file   Housing Stability: Not on file      Family History:     Family History   Problem Relation Age of Onset   • Colon cancer Mother    • Coronary artery disease Maternal Grandmother    • Coronary artery disease Paternal Grandmother    • Hypertension Family    • Colon cancer Maternal Grandfather    • Hypertension Father    • Alcohol abuse Father       Current Medications:     Current Outpatient Medications   Medication Sig Dispense Refill   • amitriptyline (ELAVIL) 10 mg tablet Take 2 tablets (20 mg total) by mouth daily at bedtime 180 tablet 3   • Botox 200 units SOLR INJECT UP  UNITS IN THE MUSCLE INTO HEAD AND NECK AREA EVERY 3 MONTHS 1 each 3   • Diclofenac Sodium (VOLTAREN) 1 % Apply 2 g topically 4 (four) times a day 350 g 5   • Ivermectin (Soolantra) 1 % CREA Apply topically as needed      • metroNIDAZOLE (METROCREAM) 0 75 % cream APPLY TO FACE TWICE A DAY     • omeprazole (PriLOSEC) 40 MG capsule TAKE 1 CAPSULE DAILY BEFORE BREAKFAST (NEEDS APPOINTMENT, NEEDS TO CALL TO SCHEDULE FOR FURTHER REFILLS) 90 capsule 3   • methocarbamol (ROBAXIN) 500 mg tablet 1 tab qhs rn neck pain (Patient not taking: Reported on 4/6/2023) 30 tablet 0     No current facility-administered medications for this visit  Allergies: Allergies   Allergen Reactions   • Sulfamethoxazole-Trimethoprim Swelling, Rash and Anaphylaxis     Joint pain, neck swelling, rash   • Pollen Extract Headache      Physical Exam:     /68 (BP Location: Left arm, Patient Position: Sitting, Cuff Size: Standard)   Pulse 81   Temp (!) 97 3 °F (36 3 °C) (Temporal)   Ht 5' (1 524 m)   Wt 58 4 kg (128 lb 12 8 oz)   LMP  (LMP Unknown)   SpO2 99%   BMI 25 15 kg/m²     Physical Exam  Constitutional:       General: She is not in acute distress  Appearance: She is not ill-appearing  HENT:      Head: Normocephalic and atraumatic     Eyes:      General: Right eye: No discharge  Left eye: No discharge  Extraocular Movements: Extraocular movements intact  Pupils: Pupils are equal, round, and reactive to light  Cardiovascular:      Rate and Rhythm: Normal rate  Pulses: Normal pulses  Pulmonary:      Effort: Pulmonary effort is normal  No respiratory distress  Breath sounds: No wheezing  Abdominal:      General: Bowel sounds are normal  There is no distension  Palpations: Abdomen is soft  Musculoskeletal:      Right lower leg: No edema  Left lower leg: No edema  Neurological:      General: No focal deficit present  Mental Status: She is alert  Motor: No weakness        Coordination: Coordination normal       Deep Tendon Reflexes: Reflexes normal    Psychiatric:         Mood and Affect: Mood normal          Behavior: Behavior normal           Ben Pearson MD  [address] [facility]

## 2023-04-06 ENCOUNTER — OFFICE VISIT (OUTPATIENT)
Dept: FAMILY MEDICINE CLINIC | Facility: CLINIC | Age: 48
End: 2023-04-06

## 2023-04-06 VITALS
OXYGEN SATURATION: 99 % | DIASTOLIC BLOOD PRESSURE: 68 MMHG | HEART RATE: 81 BPM | WEIGHT: 128.8 LBS | TEMPERATURE: 97.3 F | BODY MASS INDEX: 25.29 KG/M2 | SYSTOLIC BLOOD PRESSURE: 122 MMHG | HEIGHT: 60 IN

## 2023-04-06 DIAGNOSIS — Z00.00 ANNUAL PHYSICAL EXAM: Primary | ICD-10-CM

## 2023-04-06 DIAGNOSIS — Z12.31 ENCOUNTER FOR SCREENING MAMMOGRAM FOR MALIGNANT NEOPLASM OF BREAST: ICD-10-CM

## 2023-04-06 PROBLEM — G43.709 CHRONIC MIGRAINE WITHOUT AURA WITHOUT STATUS MIGRAINOSUS, NOT INTRACTABLE: Status: RESOLVED | Noted: 2021-11-30 | Resolved: 2023-04-06

## 2023-04-06 PROBLEM — G43.109 MIGRAINE WITH AURA AND WITHOUT STATUS MIGRAINOSUS, NOT INTRACTABLE: Status: RESOLVED | Noted: 2022-07-18 | Resolved: 2023-04-06

## 2023-04-07 ENCOUNTER — PROCEDURE VISIT (OUTPATIENT)
Dept: NEUROLOGY | Facility: CLINIC | Age: 48
End: 2023-04-07

## 2023-04-07 VITALS — HEART RATE: 78 BPM | TEMPERATURE: 97.5 F | DIASTOLIC BLOOD PRESSURE: 76 MMHG | SYSTOLIC BLOOD PRESSURE: 118 MMHG

## 2023-04-07 DIAGNOSIS — G43.709 CHRONIC MIGRAINE WITHOUT AURA WITHOUT STATUS MIGRAINOSUS, NOT INTRACTABLE: Primary | ICD-10-CM

## 2023-04-07 NOTE — PATIENT INSTRUCTIONS
Botox Therapy  Important Information    Our goal is to make sure you fully understand how Botox Therapy treatment may benefit you and to help you understand how you can play an active role in your treatments and ongoing care  Please review the following information below  Call our office IMMEDIATELY @ [phone] and speak to one of our Botox Coordinators if you have a change in insurance  (a prior authorization is required and accurate information is vital)  Please call at least 24 hours in advance if you can't make your appointment  Appointments are scheduled every 91 days (this will be scheduled in advance before leaving the office)  You must allow for at least 2-3 treatments to determine if Botox is right for you  It may take a few weeks to see a response from treatment  No hair dye or scalp massage or treatment within 24 hours of treatment  We encourage you to use a headache diary or journal to document your headache frequency and severity  You can also utilize the Migraine Blake milo (downloadable on [employer])  Sign up for the Botox Savings Program  (commercial insurance patients may qualify) Sign up at [employer] or call [phone] Option: 4  To get more information on Botox therapy for Chronic Migraines and see frequently asked questions, please visit [facility]  If you have any questions or concerns, please speak to one of our Botox Coordinators at [phone]  We look forward to servicing you! Headache management instructions  - When patient has a moderate to severe headache, they should seek rest, initiate relaxation and apply cold compresses to the head  - Maintain regular sleep schedule  Adults need at least 7-8 hours of uninterrupted a night  - Limit over the counter medications such as Tylenol, Ibuprofen, Aleve, Excedrin  (No more than 2- 3 times a week or max 10 a month)  - Maintain headache diary    Free MILO for a smart phone, which can be "used is \"Migraine alana\"  - Limit caffeine to 1-2 cups 8 to 16 oz a day or less  - Avoid dietary trigger  (aged cheese, peanuts, MSG, aspartame and nitrates)  - Patient is to have regular frequent meals to prevent headache onset  - Please drink at least 64 ounces of water a day to help remain hydrated     "

## 2023-04-07 NOTE — PROGRESS NOTES
Universal Protocol   Consent: Verbal consent obtained  Written consent obtained    Risks and benefits: risks, benefits and alternatives were discussed  Consent given by: patient  Patient understanding: patient states understanding of the procedure being performed  Patient consent: the patient's understanding of the procedure matches consent given  Procedure consent: procedure consent matches procedure scheduled        Chemodenervation     Date/Time 4/7/2023 12:32 PM     Performed by  Surya Reynoso PA-C     Authorized by Surya Reynoso PA-C        Pre-procedure details      Prepped With: Alcohol     Procedure details     Position:  Upright   Botox     Botox Type:  Type A    Brand:  Botox    mL's of Botulinum Toxin:  200    Final Concentration per CC:  100 units    Needle Gauge:  30 G 2 5 inch   Procedures     Botox Procedures: chronic headache      Indications: migraines     Injection Location      Head / Face:  L superior trapezius, R superior trapezius, L superior cervical paraspinal, R superior cervical paraspinal, L , R , procerus, L temporalis, R temporalis, R frontalis, L frontalis, R medial occipitalis and L medial occipitalis    L  injection amount:  5 unit(s)    R  injection amount:  5 unit(s)    L lateral frontalis:  5 unit(s)    R lateral frontalis:  5 unit(s)    L medial frontalis:  2 5 unit(s)    R medial frontalis:  2 5 unit(s)    Comments:  Fewer units    L temporalis injection amount:  20 unit(s)    R temporalis injection amount:  20 unit(s)    Procerus injection amount:  5 unit(s)    L medial occipitalis injection amount:  15 unit(s)    R medial occipitalis injection amount:  15 unit(s)    L superior cervical paraspinal injection amount:  10 unit(s)    R superior cervical paraspinal injection amount:  10 unit(s)    L superior trapezius injection amount:  15 unit(s)    R superior trapezius injection amount:  15 unit(s)   Total Units     Total units used: 200    Total units discarded:  0   Post-procedure details      Chemodenervation:  Chronic migraine    Facial Nerve Location[de-identified]  Bilateral facial nerve    Patient tolerance of procedure: Tolerated well, no immediate complications   Comments      Extra units medically necessary:  L SCM 5  R SCM 5  Parietal regions b/l- 10 each side (20 total)  Orbicularis oculi bilaterally- 2 5 each side (5 total)  15 units between both cervical paraspinals (R>L)     Fewer units medial frontalis as above  Will avoid SCM next visit, place more units in suboccipital muscle regions  Pt supine for front injections, otherwise sitting upright      Blood pressure 118/76, pulse 78, temperature 97 5 °F (36 4 °C), temperature source Temporal

## 2023-06-02 ENCOUNTER — TELEPHONE (OUTPATIENT)
Dept: NEUROLOGY | Facility: CLINIC | Age: 48
End: 2023-06-02

## 2023-06-02 NOTE — TELEPHONE ENCOUNTER
Botox number of units: 200  Botox quantity: 1  Arrived at what location: [facility]  Botox at Correct Administering Location: yes  NDC number: [id number]  Lot number: [id number]  Expiration Date: 2026/02  Appt notes indicate correct medication: yes

## 2023-06-19 LAB — HBA1C MFR BLD HPLC: 5.1 %

## 2023-07-05 ENCOUNTER — TELEPHONE (OUTPATIENT)
Dept: NEUROLOGY | Facility: CLINIC | Age: 48
End: 2023-07-05

## 2023-07-05 NOTE — TELEPHONE ENCOUNTER
July 5, 2023       DW    7/5/23  2:11 PM  Robert Rendon routed this conversation to Neurology [facility] Team Robert  to [facility] [address] [facility] [place] (supporting Anna Lamas)          7/5/23  1:54 PM  Sage Menchaca, I called the office and left a message with your nurse but wanted to send a note on the portal too. I must have done something to my neck and am in excruciating pain. I have pain radiating down my arm and it has triggered a migraine with auras that I can't get under control. I have Botox scheduled for Monday but was hoping to get in this week for trigger point injections. Can you let me know if this is possible? If not, maybe you could send a script for muscle relaxers? I have tried all of my conventional methods for two days now.

## 2023-07-10 ENCOUNTER — PROCEDURE VISIT (OUTPATIENT)
Dept: NEUROLOGY | Facility: CLINIC | Age: 48
End: 2023-07-10
Payer: COMMERCIAL

## 2023-07-10 VITALS
BODY MASS INDEX: 24.17 KG/M2 | SYSTOLIC BLOOD PRESSURE: 127 MMHG | WEIGHT: 128 LBS | DIASTOLIC BLOOD PRESSURE: 78 MMHG | HEIGHT: 61 IN | TEMPERATURE: 98.1 F

## 2023-07-10 DIAGNOSIS — G43.709 CHRONIC MIGRAINE WITHOUT AURA WITHOUT STATUS MIGRAINOSUS, NOT INTRACTABLE: Primary | ICD-10-CM

## 2023-07-10 DIAGNOSIS — M79.18 MYOFASCIAL PAIN: ICD-10-CM

## 2023-07-10 PROCEDURE — 64615 CHEMODENERV MUSC MIGRAINE: CPT | Performed by: PHYSICIAN ASSISTANT

## 2023-07-10 RX ORDER — KETOROLAC TROMETHAMINE 10 MG/1
10 TABLET, FILM COATED ORAL EVERY 6 HOURS PRN
Qty: 10 TABLET | Refills: 0 | Status: SHIPPED | OUTPATIENT
Start: 2023-07-10

## 2023-07-10 RX ORDER — METHOCARBAMOL 500 MG/1
TABLET, FILM COATED ORAL
Qty: 30 TABLET | Refills: 3 | Status: SHIPPED | OUTPATIENT
Start: 2023-07-10

## 2023-07-10 NOTE — PROGRESS NOTES
Universal Protocol   Consent: Verbal consent obtained. Written consent obtained.   Risks and benefits: risks, benefits and alternatives were discussed  Consent given by: patient  Patient understanding: patient states understanding of the procedure being performed  Patient consent: the patient's understanding of the procedure matches consent given  Procedure consent: procedure consent matches procedure scheduled        Chemodenervation     Date/Time 7/10/2023 8:00 AM     Performed by  Sola Gong PA-C   Authorized by Sola Gong PA-C       Pre-procedure details      Prepped With: Alcohol     Procedure details     Position:  Upright   Botox     Botox Type:  Type A    Brand:  Botox    mL's of Botulinum Toxin:  200    Final Concentration per CC:  100 units    Needle Gauge:  30 G 2.5 inch   Procedures     Botox Procedures: chronic headache      Indications: migraines     Injection Location      Head / Face:  L superior trapezius, R superior trapezius, L superior cervical paraspinal, R superior cervical paraspinal, L , R , procerus, L temporalis, R temporalis, R frontalis, L frontalis, R medial occipitalis and L medial occipitalis    L  injection amount:  5 unit(s)    R  injection amount:  5 unit(s)    L lateral frontalis:  5 unit(s)    R lateral frontalis:  5 unit(s)    L medial frontalis:  2.5 unit(s)    R medial frontalis:  2.5 unit(s)    Comments:  Fewer units    L temporalis injection amount:  20 unit(s)    R temporalis injection amount:  20 unit(s)    Procerus injection amount:  5 unit(s)    L medial occipitalis injection amount:  15 unit(s)    R medial occipitalis injection amount:  15 unit(s)    L superior cervical paraspinal injection amount:  10 unit(s)    R superior cervical paraspinal injection amount:  10 unit(s)    L superior trapezius injection amount:  15 unit(s)    R superior trapezius injection amount:  15 unit(s)   Total Units     Total units used:  200 Total units discarded:  0   Post-procedure details      Chemodenervation:  Chronic migraine    Facial Nerve Location[de-identified]  Bilateral facial nerve    Patient tolerance of procedure: Tolerated well, no immediate complications   Comments      Extra units medically necessary:  10 units between each splenius capitis  Parietal regions b/l- 10 each side (20 total)  Orbicularis oculi bilaterally- 2.5 each side (5 total)  15 units between both upper traps (R>L)     Fewer units medial frontalis as above. Pt supine for front injections, otherwise sitting upright. Blood pressure 127/78, temperature 98.1 °F (36.7 °C), temperature source Temporal, height 5' 1" (1.549 m), weight 58.1 kg (128 lb). Pt reports increased neck pain R>L upper and middle traps, after pulling weeds. No arm weakness or numbness, but does have pain down the right arm at times. The neck pain comes and goes, associated with a migraine and she did have an aura at 1 point. No character change to the migraine. In the past the patient has benefited from trigger point injections, but today she is getting Botox. After a few days if she does not notice an improvement with Botox, and I also sent her a muscle relaxant and Toradol, she should contact me for emergent trigger point injections in the office.

## 2023-07-10 NOTE — PATIENT INSTRUCTIONS
Botox Therapy  Important Information    Our goal is to make sure you fully understand how Botox Therapy treatment may benefit you and to help you understand how you can play an active role in your treatments and ongoing care. Please review the following information below. Call our office IMMEDIATELY @ [phone] and speak to one of our Botox Coordinators if you have a change in insurance. (a prior authorization is required and accurate information is vital)  Please call at least 24 hours in advance if you can't make your appointment. Appointments are scheduled every 91 days (this will be scheduled in advance before leaving the office)  You must allow for at least 2-3 treatments to determine if Botox is right for you. It may take a few weeks to see a response from treatment. No hair dye or scalp massage or treatment within 24 hours of treatment  We encourage you to use a headache diary or journal to document your headache frequency and severity. You can also utilize the Migraine Blake milo (downloadable on [employer])  Sign up for the Botox Savings Program. (commercial insurance patients may qualify) Sign up at [employer] or call [phone] Option: 4  To get more information on Botox therapy for Chronic Migraines and see frequently asked questions, please visit 5 examples  If you have any questions or concerns, please speak to one of our Botox Coordinators at [phone]. We look forward to servicing you! Headache management instructions  - When patient has a moderate to severe headache, they should seek rest, initiate relaxation and apply cold compresses to the head. - Maintain regular sleep schedule. Adults need at least 7-8 hours of uninterrupted a night. - Limit over the counter medications such as Tylenol, Ibuprofen, Aleve, Excedrin. (No more than 2- 3 times a week or max 10 a month). - Maintain headache diary.   Free MILO for a smart phone, which can be used is "Migraine alana"  - Limit caffeine to 1-2 cups 8 to 16 oz a day or less. - Avoid dietary trigger. (aged cheese, peanuts, MSG, aspartame and nitrates). - Patient is to have regular frequent meals to prevent headache onset. - Please drink at least 64 ounces of water a day to help remain hydrated.

## 2023-07-21 DIAGNOSIS — K31.A0 INTESTINAL METAPLASIA OF GASTRIC MUCOSA: ICD-10-CM

## 2023-07-21 RX ORDER — OMEPRAZOLE 40 MG/1
40 CAPSULE, DELAYED RELEASE ORAL DAILY
Qty: 90 CAPSULE | Refills: 3 | Status: SHIPPED | OUTPATIENT
Start: 2023-07-21

## 2023-09-20 ENCOUNTER — OFFICE VISIT (OUTPATIENT)
Dept: NEUROLOGY | Facility: CLINIC | Age: 48
End: 2023-09-20
Payer: COMMERCIAL

## 2023-09-20 VITALS
DIASTOLIC BLOOD PRESSURE: 80 MMHG | TEMPERATURE: 98.1 F | BODY MASS INDEX: 23.98 KG/M2 | HEIGHT: 61 IN | WEIGHT: 127 LBS | HEART RATE: 83 BPM | SYSTOLIC BLOOD PRESSURE: 124 MMHG

## 2023-09-20 DIAGNOSIS — G43.709 CHRONIC MIGRAINE WITHOUT AURA WITHOUT STATUS MIGRAINOSUS, NOT INTRACTABLE: Primary | ICD-10-CM

## 2023-09-20 DIAGNOSIS — H93.11 TINNITUS OF RIGHT EAR: ICD-10-CM

## 2023-09-20 DIAGNOSIS — R29.898 RIGHT ARM WEAKNESS: ICD-10-CM

## 2023-09-20 DIAGNOSIS — G43.109 MIGRAINE WITH AURA AND WITHOUT STATUS MIGRAINOSUS, NOT INTRACTABLE: ICD-10-CM

## 2023-09-20 DIAGNOSIS — M54.2 NECK PAIN: ICD-10-CM

## 2023-09-20 PROCEDURE — 99215 OFFICE O/P EST HI 40 MIN: CPT | Performed by: PHYSICIAN ASSISTANT

## 2023-09-20 NOTE — PROGRESS NOTES
Patient ID: Salma Anand is a 56 y.o. female. Assessment/Plan:       Diagnoses and all orders for this visit:    Chronic migraine without aura without status migrainosus, not intractable  -     MRI brain IAC wo and w contrast; Future    Migraine with aura and without status migrainosus, not intractable  -     MRI brain IAC wo and w contrast; Future    Tinnitus of right ear  -     MRI brain IAC wo and w contrast; Future    Right arm weakness  -     MRI cervical spine wo contrast; Future    Neck pain  -     MRI cervical spine wo contrast; Future         Continue Botox injections for prevention of chronic migraine. She denies side effects. Since starting botox, the patient reports greater than 7 days of migraine relief from baseline, correlated with headache diary, decreased abortive medication use and decreased ER visits. She will consider referral to rheumatology for third episode of uveitis with myofascial pain and joint pains as noted. She will also consider ENT evaluation for tinnitus with vertigo. She may need audiology testing to rule out hearing loss and Ménière's. She has genetic testing pending with gyn. For upper extremity weakness and tingling, pain, cervical spine MRI was ordered, and to rule out intracranial ideology for vertigo I also ordered a brain MRI. The patient should not hesitate to call me prior to her follow up with any questions or concerns. Subjective:    HPI  Ms. Salma Anand is here for neurological follow-up. She is here for Botox reauthorization. Her migraine headaches are significantly reduced with Botox. Unfortunately she has had some more migraines with wear off of Botox, and dizzy spells as well, which are lasting longer and more frequent. She is experiencing ringing in the right ear. She is not sure if she has a family history of Ménière's or other ENT disorder. She states she has dizziness which is random, comes and goes throughout the day or week. Sometimes it comes on without warning and she feels like she needs to hold onto the chair where she is sitting. It can occur without moving but it typically occurs with movement of the head and neck or body. It feels like she is on a boat. There is a constant high-pitched ringing in the right ear but sometimes in the left. She is taking off guard with the dizziness at times, she was in the middle of a presentation at work through [employer] and the dizziness occurred and she had to take a slight break but then went back into it after a few moments. She had a third episode of uveitis and went to see ophthalmology. She already saw a rheumatology in the past.    She is experiencing neck pain with right greater than left arm pain and weakness in the right arm when the pain is severe. It is typically associated with the headache. It is also associated with numbness and tingling in the thumbs, pinkies and sometimes the middle finger on the right. The following portions of the patient's history were reviewed and updated as appropriate:   She  has a past medical history of Allergic rhinitis (4/7/2015), Anal fissure, Anxiety, Batres's esophagus without dysplasia (5/20/2020), Family history of colon cancer in mother (10/19/2018), Gastritis (1/8/2020), GERD (gastroesophageal reflux disease), History of COVID-19 (12/2020), Migraine without aura and without status migrainosus, not intractable (5/17/2019), and Reactive arthritis ([address]) (4/29/2021).   She   Patient Active Problem List    Diagnosis Date Noted   • Migraine with aura and without status migrainosus, not intractable 09/24/2023   • Tinnitus of right ear 09/24/2023   • Right arm weakness 09/24/2023   • Neck pain 09/24/2023   • Chronic migraine without aura without status migrainosus, not intractable 07/10/2023   • Uveitis 07/18/2022   • Myofascial pain 08/12/2021   • Reactive arthritis ([address]) 04/29/2021   • Batres's esophagus without dysplasia 05/20/2020   • Gastroesophageal reflux disease 05/20/2020   • Cervical dystonia 05/17/2019   • Migraine without aura and without status migrainosus, not intractable 05/17/2019   • Family history of colon cancer in mother 10/19/2018   • Generalized anxiety disorder 09/10/2018     She  has a past surgical history that includes Varicose vein surgery; Hemorrhoid surgery; Colonoscopy (07/2019); Upper gastrointestinal endoscopy (07/2019); pr anrct xm surg req anes general spi/edrl dx (N/A, 3/26/2020); and pr surg tx anal fistula subq (N/A, 3/26/2020). Her family history includes Alcohol abuse in her father; Colon cancer in her maternal grandfather and mother; Coronary artery disease in her maternal grandmother and paternal grandmother; Hypertension in her family and father. She  reports that she quit smoking about 18 years ago. Her smoking use included cigarettes. She has never used smokeless tobacco. She reports current alcohol use. She reports that she does not use drugs. Current Outpatient Medications   Medication Sig Dispense Refill   • amitriptyline (ELAVIL) 10 mg tablet Take 2 tablets (20 mg total) by mouth daily at bedtime 180 tablet 3   • Botox 200 units SOLR INJECT UP  UNITS IN THE MUSCLE INTO HEAD AND NECK AREA EVERY 3 MONTHS 1 each 3   • Diclofenac Sodium (VOLTAREN) 1 % Apply 2 g topically 4 (four) times a day 350 g 5   • Ivermectin (Soolantra) 1 % CREA Apply topically as needed      • methocarbamol (ROBAXIN) 500 mg tablet 1 tab qid prn neck pain 30 tablet 3   • metroNIDAZOLE (METROCREAM) 0.75 % cream APPLY TO FACE TWICE A DAY     • omeprazole (PriLOSEC) 40 MG capsule TAKE 1 CAPSULE DAILY 90 capsule 3   • ketorolac (TORADOL) 10 mg tablet Take 1 tablet (10 mg total) by mouth every 6 (six) hours as needed (migraine) Max 2-3 per week. (Patient not taking: Reported on 9/20/2023) 10 tablet 0     No current facility-administered medications for this visit.      She is allergic to sulfamethoxazole-trimethoprim and pollen extract. .         Objective:    Blood pressure 124/80, pulse 83, temperature 98.1 °F (36.7 °C), temperature source Temporal, height 5' 1" (1.549 m), weight 57.6 kg (127 lb). Body mass index is 24 kg/m². Physical Exam    Neurological Exam  On neurologic exam, the patient is alert and oriented to time and place. Speech is fluent and articulate, and the patient follows commands appropriately. Judgment and affect appear normal. Pupils are equally round and reactive to light and extraocular muscles are intact without nystagmus. Face is symmetric, and tongue, uvula, and palate are midline. Hearing is intact, but patient states less hearing on the right to finger rub compared to the left. Motor examination reveals intact strength throughout. Normal gait is steady. ROS:    Review of Systems   Constitutional: Negative for appetite change, fatigue and fever. HENT: Negative. Negative for hearing loss, tinnitus, trouble swallowing and voice change. Eyes: Negative. Negative for photophobia, pain and visual disturbance. Respiratory: Negative. Negative for shortness of breath. Cardiovascular: Negative. Negative for palpitations. Gastrointestinal: Negative. Negative for nausea and vomiting. Endocrine: Negative. Negative for cold intolerance. Genitourinary: Negative. Negative for dysuria, frequency and urgency. Musculoskeletal: Negative for back pain, gait problem, myalgias and neck pain. Skin: Negative. Negative for rash. Allergic/Immunologic: Negative. Neurological: Positive for dizziness, numbness and headaches. Negative for tremors, seizures, syncope, facial asymmetry, speech difficulty, weakness and light-headedness. Hematological: Negative. Does not bruise/bleed easily. Psychiatric/Behavioral: Negative. Negative for confusion, hallucinations and sleep disturbance. ROS reviewed.   I have spent a total time of 40 minutes on 9/20/2023 in caring for this patient including Diagnostic results, Risks and benefits of tx options, Instructions for management, Patient and family education, Importance of tx compliance, Risk factor reductions, Counseling / Coordination of care, Documenting in the medical record, Reviewing / ordering tests, medicine, procedures   and Obtaining or reviewing history  .

## 2023-09-20 NOTE — PATIENT INSTRUCTIONS
Nerivio device- for migraine abortive treatment    Headache management instructions  - When patient has a moderate to severe headache, they should seek rest, initiate relaxation and apply cold compresses to the head. - Maintain regular sleep schedule. Adults need at least 7-8 hours of uninterrupted a night. - Limit over the counter medications such as Tylenol, Ibuprofen, Aleve, Excedrin. (No more than 2- 3 times a week or max 10 a month). - Maintain headache diary. Free GEORGI for a smart phone, which can be used is "Migraine alana"  - Limit caffeine to 1-2 cups 8 to 16 oz a day or less. - Avoid dietary trigger. (aged cheese, peanuts, MSG, aspartame and nitrates). - Patient is to have regular frequent meals to prevent headache onset. - Please drink at least 64 ounces of water a day to help remain hydrated. Botox Therapy  Important Information    Our goal is to make sure you fully understand how Botox Therapy treatment may benefit you and to help you understand how you can play an active role in your treatments and ongoing care. Please review the following information below. Call our office IMMEDIATELY @ [phone] and speak to one of our Botox Coordinators if you have a change in insurance. (a prior authorization is required and accurate information is vital)  Please call at least 24 hours in advance if you can't make your appointment. Appointments are scheduled every 91 days (this will be scheduled in advance before leaving the office)  You must allow for at least 2-3 treatments to determine if Botox is right for you. It may take a few weeks to see a response from treatment. No hair dye or scalp massage or treatment within 24 hours of treatment  We encourage you to use a headache diary or journal to document your headache frequency and severity.  You can also utilize the Migraine Alana georgi (downloadable on [employer])  Sign up for the [employer] Program. (commercial insurance patients may qualify) Sign up at [email or web address] or call [phone] Option: 4  To get more information on Botox therapy for Chronic Migraines and see frequently asked questions, please visit [email or web address]. [employer]  If you have any questions or concerns, please speak to one of our Botox Coordinators at [phone]. We look forward to servicing you!

## 2023-09-24 PROBLEM — H93.11 TINNITUS OF RIGHT EAR: Status: ACTIVE | Noted: 2023-09-24

## 2023-09-24 PROBLEM — M54.2 NECK PAIN: Status: ACTIVE | Noted: 2023-09-24

## 2023-09-24 PROBLEM — R29.898 RIGHT ARM WEAKNESS: Status: ACTIVE | Noted: 2023-09-24

## 2023-09-24 PROBLEM — G43.109 MIGRAINE WITH AURA AND WITHOUT STATUS MIGRAINOSUS, NOT INTRACTABLE: Status: ACTIVE | Noted: 2023-09-24

## 2023-10-02 ENCOUNTER — TELEPHONE (OUTPATIENT)
Dept: NEUROLOGY | Facility: CLINIC | Age: 48
End: 2023-10-02

## 2023-10-02 NOTE — TELEPHONE ENCOUNTER
Submitted Re-Authorization request  via [employer] to [facility] for:     Botox-200 units: [id number], [id number]. Qty:1, Q3 Months. DX: G43.709, Chronic Migraine. Pending-Auth# [id number]      Awaiting approval/denial response. Will follow up on the status of this.

## 2023-10-04 ENCOUNTER — TELEPHONE (OUTPATIENT)
Dept: NEUROLOGY | Facility: CLINIC | Age: 48
End: 2023-10-04

## 2023-10-04 NOTE — TELEPHONE ENCOUNTER
Received the following authorization info via [employer] through [facility]:     Approved:  Botox-200 units  Qty: 1, Q3 Months (800 Units Total)  Auth# [id number]   [id number] for CPT: 12062. Valid: 10/02/2023 until 10/02/2024  4 visits     Please use [facility].

## 2023-10-04 NOTE — TELEPHONE ENCOUNTER
Andrew Ibrahim and gave verbal RX to Sharee (pharmacist) for:     Shea Ro. 1  DX: G43.709, Chronic Migraine. Sig: Inject up to 200 UNITS I.M into the various sites in the head and neck once every three months for one year with  Refills: 3    Sharee did expedite this order to be processed STAT per my request.    Will follow up on the status of this order by Thursday 10/05/2023.

## 2023-10-05 NOTE — TELEPHONE ENCOUNTER
0 [facility] and spoke [email or web address] on the status of patients Botox delivery and has the following conversation. "You  Patient Kelin Boyce : 1975, Zip is [phone] for Botox-200 Units delivery to our office for her injection appt on Wednesday 10/11/2023! JANEE  It looks like the insurance changed recently to [facility] is that accurate? You  This patient has had [facility]. Kailee Pederseny  [id number] for the policy? You  New PA was obtained Valid: 10/01/2023 until 10/01/2024. JANEE  What's the PA approval number? You  Sorry Correction- PA# [id number] Valid: 10/02/2023 until 10/02/2024! JANEE  Getting it added. You  Also yes that is the policy#  You  Okay thank you  You  Do you know an estimate on how long until delivery is able to be scheduled? JANEE  The new prescription was received yesterday so I'm putting in a process task on that as well. You  okay thank you! JANEE  It'll be ready within 2 - 3 business days. You  Okay!"    Will follow up on the status of this order.

## 2023-10-10 ENCOUNTER — RA CDI HCC (OUTPATIENT)
Dept: OTHER | Facility: HOSPITAL | Age: 48
End: 2023-10-10

## 2023-10-10 NOTE — TELEPHONE ENCOUNTER
0 [facility] and spoke to Caleb via chat on the status of patients Botox delivery and has the following conversation. Yamilka Led. I need to place an urgent request to push this through. Please allow me 2-3 minutes to place the request  You  okay i thought that has already been done! CALEB  Please allow me to complete the request  You  ok  Gualberto Díaz an urgent request to clear through the Pharmacy team. Once cleared and ready to schedule, we will reach out to the pt to get consent to ship.  Please allow up to 1-2 Business hrs for request to be completed  CALEB  I did notate the pt's appointment

## 2023-10-10 NOTE — PROGRESS NOTES
[address] [facility] coding opportunities       Chart reviewed, no opportunity found: CHART REVIEWED, NO OPPORTUNITY FOUND        Patients Insurance        Commercial Insurance: Juan Carlos De La Cruz

## 2023-10-11 NOTE — TELEPHONE ENCOUNTER
Coleman Garnett is active and ready to schedule, but we are needing the shipping consent from the patient. I can reach out to her, one moment while I do so. You  okay thank you! PAOLA  I reached out but she did not answer. I left a voicemail, but you may need to try to reach her and have her call us at [phone] ASAP. Once we get the consent, you can schedule the delivery. You  okay I will call her as well."       Called patient and left VM message requesting they reach out to [facility] today and provide them w/ any and all info needed in order to have Botox medication delivered to our office tomorrow in the AM prior to their 10/12/2023 1:00pm MIRTHA-Js.

## 2023-10-11 NOTE — TELEPHONE ENCOUNTER
[facility] via chat and scheduled delivery with Alo Mosquera for:    Botox-200 units  Qty:1  Delivery to [facility] in the AM  Scheduled for 10/12/2023  Via: [employer]    Please advise if medication doesn't arrive.

## 2023-10-11 NOTE — TELEPHONE ENCOUNTER
0 [facility] and spoke to Corey via chat on the status of patients Botox delivery and has the following conversation. "TEODORO  I do see the benefit verification complete on account but it is not pulling up for me to schedule allow me a few min I am going to submit an urgent task to complete rx for scheduling and then also submit an escalation on her account due to her appt being tomorrow  You  okay thank you  TEODORO  she has not received since June correct  You  correct! TEOODRO  I did submit the urgent request to complete her rx for scheduling but when trying to submit the escalation it tells me there is already one in process. I am going to reach out and get an update on that, one moment  You  ok thanks. TEODORO  Update on the Rescue rescalation that was submitted is that it was accepted and in process, usually those take 24-48 hours to resolve but with that already being in process since 13:52 CDT yesterday and the urgent request I just submitted, I would say give it a couple hours and check back. We still have time to get that scheeduled to ship today for you to receive by 10:30 am tomorrow for her appt. You  okay thank you so much!"      Will follow up on the status of this order again later this afternoon!

## 2023-10-12 ENCOUNTER — PROCEDURE VISIT (OUTPATIENT)
Dept: NEUROLOGY | Facility: CLINIC | Age: 48
End: 2023-10-12
Payer: COMMERCIAL

## 2023-10-12 VITALS
WEIGHT: 127 LBS | DIASTOLIC BLOOD PRESSURE: 78 MMHG | BODY MASS INDEX: 23.98 KG/M2 | HEIGHT: 61 IN | SYSTOLIC BLOOD PRESSURE: 128 MMHG | TEMPERATURE: 98.3 F | HEART RATE: 95 BPM

## 2023-10-12 DIAGNOSIS — G43.709 CHRONIC MIGRAINE WITHOUT AURA WITHOUT STATUS MIGRAINOSUS, NOT INTRACTABLE: Primary | ICD-10-CM

## 2023-10-12 PROCEDURE — 64615 CHEMODENERV MUSC MIGRAINE: CPT | Performed by: PHYSICIAN ASSISTANT

## 2023-10-12 RX ORDER — LORAZEPAM 0.5 MG/1
TABLET ORAL
Qty: 4 TABLET | Refills: 0 | Status: SHIPPED | OUTPATIENT
Start: 2023-10-12

## 2023-10-12 NOTE — PROGRESS NOTES
Universal Protocol   Consent: Verbal consent obtained. Written consent obtained.   Risks and benefits: risks, benefits and alternatives were discussed  Consent given by: patient  Patient understanding: patient states understanding of the procedure being performed  Patient consent: the patient's understanding of the procedure matches consent given  Procedure consent: procedure consent matches procedure scheduled      Chemodenervation     Date/Time  10/12/2023 1:30 PM     Performed by  Jamee Jeans, PA-C   Authorized by  Jamee Jeans, PA-C     Pre-procedure details      Prepped With: Alcohol     Procedure details      Position:  Upright   Botox      Botox Type:  Type A    Brand:  Botox    mL's of Botulinum Toxin:  200    Final Concentration per CC:  100 units    Needle Gauge:  30 G 2.5 inch   Procedures      Botox Procedures: chronic headache      Indications: migraines     Injection Location      Head / Face:  L superior trapezius, R superior trapezius, L superior cervical paraspinal, R superior cervical paraspinal, L , R , procerus, L temporalis, R temporalis, R frontalis, L frontalis, R medial occipitalis and L medial occipitalis    L  injection amount:  5 unit(s)    R  injection amount:  5 unit(s)    L lateral frontalis:  5 unit(s)    R lateral frontalis:  5 unit(s)    L medial frontalis:  0 unit(s)    R medial frontalis:  0 unit(s)    L temporalis injection amount:  20 unit(s)    R temporalis injection amount:  20 unit(s)    Procerus injection amount:  5 unit(s)    L medial occipitalis injection amount:  15 unit(s)    R medial occipitalis injection amount:  15 unit(s)    L superior cervical paraspinal injection amount:  10 unit(s)    R superior cervical paraspinal injection amount:  10 unit(s)    L superior trapezius injection amount:  15 unit(s)    R superior trapezius injection amount:  15 unit(s)   Total Units      Total units used:  200    Total units discarded:  0 Post-procedure details      Chemodenervation:  Chronic migraine    Facial Nerve Location[de-identified]  Bilateral facial nerve    Patient tolerance of procedure: Tolerated well, no immediate complications   Comments       Extra units medically necessary:  10 units between each splenius capitis  Parietal regions b/l- 10 each side (20 total)  Orbicularis oculi bilaterally- 2.5 each side (5 total)  20 units between both middle traps (R>L)     Fewer units medial frontalis as above. Pt supine for front injections, otherwise sitting upright. Blood pressure 128/78, pulse 95, temperature 98.3 °F (36.8 °C), temperature source Temporal, height 5' 1" (1.549 m), weight 57.6 kg (127 lb).

## 2023-10-12 NOTE — TELEPHONE ENCOUNTER
Botox number of units: 200  Botox quantity: 1  Arrived at what location: [facility]  Botox at Correct Administering Location: yes  NDC number: 0228-0480-57  Lot number: [id number]  Expiration Date: 03/12/26  Appt notes indicate correct medication: yes

## 2023-11-07 DIAGNOSIS — G43.009 MIGRAINE WITHOUT AURA AND WITHOUT STATUS MIGRAINOSUS, NOT INTRACTABLE: ICD-10-CM

## 2023-11-07 RX ORDER — AMITRIPTYLINE HYDROCHLORIDE 10 MG/1
20 TABLET, FILM COATED ORAL
Qty: 180 TABLET | Refills: 3 | Status: SHIPPED | OUTPATIENT
Start: 2023-11-07

## 2023-12-13 ENCOUNTER — TELEPHONE (OUTPATIENT)
Dept: NEUROLOGY | Facility: CLINIC | Age: 48
End: 2023-12-13

## 2023-12-13 NOTE — TELEPHONE ENCOUNTER
Patient is scheduled for BINJ on 1/11 in . Contacted [facility] via chat and spoke to SUNITA .  She states that delivery is ready for shipment, but need consent from the patient.  She left  for patient.     I sent MyChart msg to patient.

## 2023-12-15 NOTE — TELEPHONE ENCOUNTER
Contacted [facility] via chat to check status and spoke to hCrist. Consent is still needed from patient. They have not been able to reach her. Will f/u next week.

## 2023-12-20 NOTE — TELEPHONE ENCOUNTER
"Contacted [facility] via chat and spoke to Kelsey.   \"KELSEY  Thank you. We are still needing the patients consent to ship. Please allow me a few moments to reach out to the patient\"    Kelsey left vm for patient to obtain consent to ship.     Will f/u with [facility] next week.  " No

## 2023-12-27 NOTE — TELEPHONE ENCOUNTER
Contacted [facility] via chat to check order status and spoke to Stephanie.     Patient had scheduled delivery of Botox.     Botox 200 UNITS  Qty.1  Scheduled: 1/9  Location: [facility]  Via: Ups/[employer]       Please let us know if Botox does not arrive.       Thanks!

## 2024-01-09 NOTE — TELEPHONE ENCOUNTER
Botox number of units: 200 units  Botox quantity: 1  Arrived at what location: [place]  Botox at Correct Administering Location: Yes  NDC number: 9584-7523-28  Lot number: [id number]  Expiration Date: 03/01/2026  Appt notes indicate correct medication: Yes

## 2024-01-11 ENCOUNTER — PROCEDURE VISIT (OUTPATIENT)
Dept: NEUROLOGY | Facility: CLINIC | Age: 49
End: 2024-01-11
Payer: COMMERCIAL

## 2024-01-11 VITALS — HEART RATE: 83 BPM | DIASTOLIC BLOOD PRESSURE: 84 MMHG | TEMPERATURE: 98 F | SYSTOLIC BLOOD PRESSURE: 126 MMHG

## 2024-01-11 DIAGNOSIS — G43.709 CHRONIC MIGRAINE WITHOUT AURA WITHOUT STATUS MIGRAINOSUS, NOT INTRACTABLE: Primary | ICD-10-CM

## 2024-01-11 PROCEDURE — 64615 CHEMODENERV MUSC MIGRAINE: CPT | Performed by: PHYSICIAN ASSISTANT

## 2024-01-11 RX ORDER — SODIUM, POTASSIUM,MAG SULFATES 17.5-3.13G
SOLUTION, RECONSTITUTED, ORAL ORAL
COMMUNITY

## 2024-01-11 NOTE — PROGRESS NOTES
Universal Protocol   Consent: Verbal consent obtained. Written consent obtained.  Risks and benefits: risks, benefits and alternatives were discussed  Consent given by: patient  Patient understanding: patient states understanding of the procedure being performed  Patient consent: the patient's understanding of the procedure matches consent given  Procedure consent: procedure consent matches procedure scheduled      Chemodenervation     Date/Time  1/11/2024 3:00 PM     Performed by  Dorie Priest PA-C   Authorized by  Dorie Priest PA-C     Pre-procedure details      Prepped With: Alcohol     Procedure details      Position:  Upright   Botox      Botox Type:  Type A    Brand:  Botox    mL's of Botulinum Toxin:  200    Final Concentration per CC:  100 units    Needle Gauge:  30 G 2.5 inch   Procedures      Botox Procedures: chronic headache      Indications: migraines     Injection Location      Head / Face:  L superior trapezius, R superior trapezius, L superior cervical paraspinal, R superior cervical paraspinal, L , R , procerus, L temporalis, R temporalis, R frontalis, L frontalis, R medial occipitalis and L medial occipitalis    L  injection amount:  5 unit(s)    R  injection amount:  5 unit(s)    L lateral frontalis:  5 unit(s)    R lateral frontalis:  5 unit(s)    L medial frontalis:  0 unit(s)    R medial frontalis:  0 unit(s)    L temporalis injection amount:  20 unit(s)    R temporalis injection amount:  20 unit(s)    Procerus injection amount:  5 unit(s)    L medial occipitalis injection amount:  15 unit(s)    R medial occipitalis injection amount:  15 unit(s)    L superior cervical paraspinal injection amount:  10 unit(s)    R superior cervical paraspinal injection amount:  10 unit(s)    L superior trapezius injection amount:  15 unit(s)    R superior trapezius injection amount:  15 unit(s)   Total Units      Total units used:  200    Total units discarded:  0    Post-procedure details      Chemodenervation:  Chronic migraine    Facial Nerve Location::  Bilateral facial nerve    Patient tolerance of procedure:  Tolerated well, no immediate complications   Comments        Extra units medically necessary:  10 units between each splenius capitis  Parietal regions b/l- 10 each side (20 total)  Orbicularis oculi bilaterally- 2.5 each side (5 total)  20 units between both middle traps (R>L)     Fewer units medial frontalis as above.  Pt supine for front injections, otherwise sitting upright.       Blood pressure 126/84, pulse 83, temperature 98 °F (36.7 °C), temperature source Temporal.    HAs R frontotemporal region, and R neck region.  Pt going to see ENT for tinnitus, prior to getting brain MRI.  Neck a little better with massage device at home, stretching on a regular basis.

## 2024-02-26 NOTE — ANESTHESIA POSTPROCEDURE EVALUATION
Post-Op Assessment Note    CV Status:  Stable    Pain management: adequate     Mental Status:  Alert and awake   Hydration Status:  Euvolemic   PONV Controlled:  Controlled   Airway Patency:  Patent   Post Op Vitals Reviewed: Yes      Staff: Anesthesiologist, CRNA           BP   118/   Temp      Pulse    Resp   13   SpO2   100 No

## 2024-04-05 ENCOUNTER — TELEPHONE (OUTPATIENT)
Dept: NEUROLOGY | Facility: CLINIC | Age: 49
End: 2024-04-05

## 2024-04-05 NOTE — TELEPHONE ENCOUNTER
Called [facility] and spoke with Archana regarding the delivery status of patients Botox medication. Archana informed me that patients account is currently pending because they need to obtain their verbal consent to have Botox delivered to our office.     Called patient and left VM message requesting they reach out to [facility] ASAP and provide them with any & all info that is needed in order to have their Botox medication delivered to our office prior to her upcoming BINJ-Appt on 4/11/2024.

## 2024-04-08 NOTE — TELEPHONE ENCOUNTER
Called [facility] and scheduled delivery with Parvin for:     Botox-200 units  Qty:1  Delivery to [place]  Scheduled for 04/09/2024  Via: FedEx     Please advise if medication doesn't arrive.

## 2024-04-10 NOTE — TELEPHONE ENCOUNTER
Botox number of units: 200 units  Botox quantity: 1  Arrived at what location: [place]  Botox at Correct Administering Location: Yes  NDC number: [id number]  Lot number: [id number]  Expiration Date: 07/01/2026  Appt notes indicate correct medication: Yes

## 2024-04-11 ENCOUNTER — PROCEDURE VISIT (OUTPATIENT)
Dept: NEUROLOGY | Facility: CLINIC | Age: 49
End: 2024-04-11
Payer: COMMERCIAL

## 2024-04-11 VITALS — TEMPERATURE: 98.2 F | SYSTOLIC BLOOD PRESSURE: 112 MMHG | DIASTOLIC BLOOD PRESSURE: 68 MMHG

## 2024-04-11 DIAGNOSIS — G43.709 CHRONIC MIGRAINE WITHOUT AURA WITHOUT STATUS MIGRAINOSUS, NOT INTRACTABLE: Primary | ICD-10-CM

## 2024-04-11 PROCEDURE — 64615 CHEMODENERV MUSC MIGRAINE: CPT | Performed by: PHYSICIAN ASSISTANT

## 2024-04-11 NOTE — PROGRESS NOTES
Universal Protocol   Consent: Verbal consent obtained. Written consent obtained.  Risks and benefits: risks, benefits and alternatives were discussed  Consent given by: patient  Patient understanding: patient states understanding of the procedure being performed  Patient consent: the patient's understanding of the procedure matches consent given  Procedure consent: procedure consent matches procedure scheduled      Chemodenervation     Date/Time  4/11/2024 3:00 PM     Performed by  Dorie Priest PA-C   Authorized by  Dorie Priest PA-C     Pre-procedure details      Prepped With: Alcohol     Procedure details      Position:  Upright   Botox      Botox Type:  Type A    Brand:  Botox    mL's of Botulinum Toxin:  200    Final Concentration per CC:  100 units    Needle Gauge:  30 G 2.5 inch   Procedures      Botox Procedures: chronic headache      Indications: migraines     Injection Location      Head / Face:  L superior trapezius, R superior trapezius, L superior cervical paraspinal, R superior cervical paraspinal, L , R , procerus, L temporalis, R temporalis, R frontalis, L frontalis, R medial occipitalis and L medial occipitalis    L  injection amount:  5 unit(s)    R  injection amount:  5 unit(s)    L lateral frontalis:  5 unit(s)    R lateral frontalis:  5 unit(s)    L medial frontalis:  0 unit(s)    R medial frontalis:  0 unit(s)    L temporalis injection amount:  20 unit(s)    R temporalis injection amount:  20 unit(s)    Procerus injection amount:  5 unit(s)    L medial occipitalis injection amount:  15 unit(s)    R medial occipitalis injection amount:  15 unit(s)    L superior cervical paraspinal injection amount:  10 unit(s)    R superior cervical paraspinal injection amount:  10 unit(s)    L superior trapezius injection amount:  15 unit(s)    R superior trapezius injection amount:  15 unit(s)   Total Units      Total units used:  200    Total units discarded:  0    Post-procedure details      Chemodenervation:  Chronic migraine    Facial Nerve Location::  Bilateral facial nerve    Patient tolerance of procedure:  Tolerated well, no immediate complications   Comments       Extra units medically necessary:  10 units between each splenius capitis  Parietal regions b/l- 10 each side (20 total)  Orbicularis oculi bilaterally- 2.5 each side (5 total)  20 units between both middle traps (R>L)    Fewer units medial frontalis as above.  Pt supine for front injections, otherwise sitting upright.       Blood pressure 112/68, temperature 98.2 °F (36.8 °C), temperature source Temporal.    HAs R frontotemporal region, and R neck region.  Pt seeing ENT for tinnitus, imaging recently done and essentially unremarkable, done at [facility].

## 2024-07-09 ENCOUNTER — TELEPHONE (OUTPATIENT)
Dept: NEUROLOGY | Facility: CLINIC | Age: 49
End: 2024-07-09

## 2024-07-09 NOTE — TELEPHONE ENCOUNTER
Botox number of units: 200  Botox quantity: 1  Arrived at what location: [facility]  Botox at Correct Administering Location: yes  NDC number: [id number]  Lot number: [id number]  Expiration Date: 12//2026  Appt notes indicate correct medication: yes

## 2024-07-11 ENCOUNTER — PROCEDURE VISIT (OUTPATIENT)
Dept: NEUROLOGY | Facility: CLINIC | Age: 49
End: 2024-07-11
Payer: COMMERCIAL

## 2024-07-11 VITALS — DIASTOLIC BLOOD PRESSURE: 77 MMHG | SYSTOLIC BLOOD PRESSURE: 125 MMHG | TEMPERATURE: 98.5 F | HEART RATE: 74 BPM

## 2024-07-11 DIAGNOSIS — G43.709 CHRONIC MIGRAINE WITHOUT AURA WITHOUT STATUS MIGRAINOSUS, NOT INTRACTABLE: Primary | ICD-10-CM

## 2024-07-11 PROCEDURE — 64615 CHEMODENERV MUSC MIGRAINE: CPT | Performed by: PHYSICIAN ASSISTANT

## 2024-07-11 NOTE — PROGRESS NOTES
Universal Protocol   Consent: Verbal consent obtained. Written consent obtained.  Risks and benefits: risks, benefits and alternatives were discussed  Consent given by: patient  Patient understanding: patient states understanding of the procedure being performed  Patient consent: the patient's understanding of the procedure matches consent given  Procedure consent: procedure consent matches procedure scheduled      Chemodenervation     Date/Time  7/11/2024 3:00 PM     Performed by  Dorie Priest PA-C   Authorized by  Dorie Priest PA-C     Pre-procedure details      Prepped With: Alcohol     Procedure details      Position:  Upright   Botox      Botox Type:  Type A    Brand:  Botox    mL's of Botulinum Toxin:  200    Final Concentration per CC:  100 units    Needle Gauge:  30 G 2.5 inch   Procedures      Botox Procedures: chronic headache      Indications: migraines     Injection Location      Head / Face:  L superior trapezius, R superior trapezius, L superior cervical paraspinal, R superior cervical paraspinal, L , R , procerus, L temporalis, R temporalis, R frontalis, L frontalis, R medial occipitalis and L medial occipitalis    L  injection amount:  5 unit(s)    R  injection amount:  5 unit(s)    L lateral frontalis:  5 unit(s)    R lateral frontalis:  5 unit(s)    L medial frontalis:  0 unit(s)    R medial frontalis:  0 unit(s)    L temporalis injection amount:  20 unit(s)    R temporalis injection amount:  20 unit(s)    Procerus injection amount:  5 unit(s)    L medial occipitalis injection amount:  15 unit(s)    R medial occipitalis injection amount:  15 unit(s)    L superior cervical paraspinal injection amount:  10 unit(s)    R superior cervical paraspinal injection amount:  10 unit(s)    L superior trapezius injection amount:  15 unit(s)    R superior trapezius injection amount:  15 unit(s)   Total Units      Total units used:  200    Total units discarded:  0    Post-procedure details      Chemodenervation:  Chronic migraine    Facial Nerve Location::  Bilateral facial nerve    Patient tolerance of procedure:  Tolerated well, no immediate complications   Comments       Extra units medically necessary:  10 units between each splenius capitis  Parietal regions b/l- 10 each side (20 total)  Orbicularis oculi bilaterally- 2.5 each side (5 total)  20 units between both middle traps (R>L)     Fewer units medial frontalis as above.  Pt supine for front injections, otherwise sitting upright.       Blood pressure 125/77, pulse 74, temperature 98.5 °F (36.9 °C), temperature source Temporal.

## 2024-07-15 DIAGNOSIS — K31.A0 INTESTINAL METAPLASIA OF GASTRIC MUCOSA: ICD-10-CM

## 2024-07-15 RX ORDER — OMEPRAZOLE 40 MG/1
40 CAPSULE, DELAYED RELEASE ORAL DAILY
Qty: 90 CAPSULE | Refills: 3 | Status: SHIPPED | OUTPATIENT
Start: 2024-07-15

## 2024-07-30 DIAGNOSIS — G43.009 MIGRAINE WITHOUT AURA AND WITHOUT STATUS MIGRAINOSUS, NOT INTRACTABLE: ICD-10-CM

## 2024-07-30 RX ORDER — AMITRIPTYLINE HYDROCHLORIDE 10 MG/1
20 TABLET, FILM COATED ORAL
Qty: 180 TABLET | Refills: 0 | Status: SHIPPED | OUTPATIENT
Start: 2024-07-30

## 2024-07-30 NOTE — TELEPHONE ENCOUNTER
Reason for call:   [x] Refill   [] Prior Auth  [x] Other: pt said she's leaving next week for vacation, will need to have refilled before then    Office:   [] PCP/Provider -   [x] Specialty/Provider - Dorie Priest    Medication: amitriptyline    Dose/Frequency: 10mg take 2 tablets at bedtime    Quantity: 180    Pharmacy: [facility]    Does the patient have enough for 3 days?   [x] Yes   [] No - Send as HP to POD

## 2024-08-01 ENCOUNTER — TELEPHONE (OUTPATIENT)
Age: 49
End: 2024-08-01

## 2024-08-01 NOTE — TELEPHONE ENCOUNTER
Patient called in and stated that she went [facility] yesterday and the doctor wasn't sure if she had shingles or not. Patient was put on medication and doesn't think she has shingles and would like an appointment to come in. Patient has no fever or pain, she said that one spot on her chest that just burns and itches.     Please call patient back and schedule an appointment as she doesn't want to stay on the meds.Ph .    I did try calling the office for this and didn't get and answer.  Thank you.

## 2024-08-01 NOTE — TELEPHONE ENCOUNTER
I called patient and spoke to her in regards not being able to schedule her now but after 3 pm I will. I will be calling her back after I set her up.

## 2024-08-02 ENCOUNTER — OFFICE VISIT (OUTPATIENT)
Dept: FAMILY MEDICINE CLINIC | Facility: CLINIC | Age: 49
End: 2024-08-02
Payer: COMMERCIAL

## 2024-08-02 VITALS
DIASTOLIC BLOOD PRESSURE: 80 MMHG | HEIGHT: 60 IN | BODY MASS INDEX: 23.64 KG/M2 | TEMPERATURE: 97.2 F | OXYGEN SATURATION: 98 % | HEART RATE: 78 BPM | WEIGHT: 120.4 LBS | SYSTOLIC BLOOD PRESSURE: 110 MMHG

## 2024-08-02 DIAGNOSIS — M77.11 LATERAL EPICONDYLITIS OF RIGHT ELBOW: ICD-10-CM

## 2024-08-02 DIAGNOSIS — R21 RASH: Primary | ICD-10-CM

## 2024-08-02 DIAGNOSIS — M02.30 REACTIVE ARTHRITIS, UNSPECIFIED SITE (HCC): ICD-10-CM

## 2024-08-02 PROCEDURE — 99214 OFFICE O/P EST MOD 30 MIN: CPT | Performed by: FAMILY MEDICINE

## 2024-08-02 PROCEDURE — 3725F SCREEN DEPRESSION PERFORMED: CPT | Performed by: FAMILY MEDICINE

## 2024-08-02 RX ORDER — TRIAMCINOLONE ACETONIDE 1 MG/G
OINTMENT TOPICAL 2 TIMES DAILY
Qty: 30 G | Refills: 0 | Status: SHIPPED | OUTPATIENT
Start: 2024-08-02

## 2024-08-02 NOTE — PROGRESS NOTES
Ambulatory Visit  Name: Dolores Mitchell      : 1975      MRN: [id number]  Encounter Provider: Nazia Amaya MD  Encounter Date: 2024   Encounter department: [facility]    Assessment & Plan   1. Rash  Comments:  Continue course of valtrex; will add triamcinolone ointment for the rash  Orders:  -     triamcinolone (KENALOG) 0.1 % ointment; Apply topically 2 (two) times a day  2. Lateral epicondylitis of right elbow  Comments:  No improvement with physical therapy; referral to Orthopedics for possible steroid injection  Orders:  -     Ambulatory Referral to Orthopedic Surgery; Future  3. Reactive arthritis, unspecified site (HCC)  Comments:  Previously following with Rheumatology       History of Present Illness     Dolores Mitchell is a very pleasant 48 year old female who presents today with a chief complaint of a rash. Patient states that prior to the onset of the rash she was diagnosed with herpes simplex keratitis  by her ophthalmologist. She was subsequently treated with a course of ganciclovir eye drops and had her follow up appointment  with resolution of her symptoms. The next day however she developed a rash on her chest and was seen at an urgent care where she was diagnosed with shingles and started on valtrex. Patient states that she has been in contact with poison ivy as well and has now developed a rash on her neck, shoulder and abdomen. She states that the rash is very itchy. In addition to this she has been struggling with elbow pain for the past 3 months. She has been seeing an occupational therapist at her work since the onset of the pain and has been doing exercises but to no avail.     Review of Systems   Constitutional: Negative.    HENT: Negative.     Eyes: Negative.    Respiratory: Negative.     Cardiovascular: Negative.    Gastrointestinal: Negative.    Genitourinary: Negative.    Musculoskeletal:  Positive for arthralgias.   Skin:  Positive for rash.   Neurological:  Negative.    Psychiatric/Behavioral: Negative.       Current Outpatient Medications on File Prior to Visit   Medication Sig Dispense Refill    amitriptyline (ELAVIL) 10 mg tablet Take 2 tablets (20 mg total) by mouth daily at bedtime 180 tablet 0    Botox 200 units SOLR INJECT UP  UNITS IN THE MUSCLE INTO HEAD AND NECK AREA EVERY 3 MONTHS 1 each 3    Diclofenac Sodium (VOLTAREN) 1 % Apply 2 g topically 4 (four) times a day 350 g 5    Ivermectin (Soolantra) 1 % CREA Apply topically as needed       metroNIDAZOLE (METROCREAM) 0.75 % cream APPLY TO FACE TWICE A DAY      Na Sulfate-K Sulfate-Mg Sulf 17.5-3.13-1.6 GM/177ML SOLN       omeprazole (PriLOSEC) 40 MG capsule TAKE 1 CAPSULE DAILY 90 capsule 3    ketorolac (TORADOL) 10 mg tablet Take 1 tablet (10 mg total) by mouth every 6 (six) hours as needed (migraine) Max 2-3 per week. 10 tablet 0    LORazepam (ATIVAN) 0.5 mg tablet 1 tab 2 hours prior to mri, repeat immediately prior to mri prn anxiety. Repeat for second MRI if needed. (Patient not taking: Reported on 2024) 4 tablet 0    methocarbamol (ROBAXIN) 500 mg tablet 1 tab qid prn neck pain (Patient not taking: Reported on 2024) 30 tablet 3     No current facility-administered medications on file prior to visit.      Social History     Tobacco Use    Smoking status: Former     Current packs/day: 0.00     Types: Cigarettes     Quit date:      Years since quittin.5    Smokeless tobacco: Never   Vaping Use    Vaping status: Never Used   Substance and Sexual Activity    Alcohol use: Yes     Comment: Socially     Drug use: No    Sexual activity: Not on file     Objective     /80 (BP Location: Left arm, Patient Position: Sitting, Cuff Size: Adult)   Pulse 78   Temp (!) 97.2 °F (36.2 °C) (Temporal)   Ht 5' (1.524 m)   Wt 54.6 kg (120 lb 6.4 oz)   LMP 2024   SpO2 98%   BMI 23.51 kg/m²     Physical Exam  Constitutional:       General: She is not in acute distress.     Appearance: She  is not ill-appearing.   HENT:      Head: Normocephalic and atraumatic.   Eyes:      General:         Right eye: No discharge.         Left eye: No discharge.      Extraocular Movements: Extraocular movements intact.   Cardiovascular:      Rate and Rhythm: Normal rate.   Pulmonary:      Effort: Pulmonary effort is normal. No respiratory distress.   Musculoskeletal:      Right elbow: Tenderness present in lateral epicondyle.   Skin:     Findings: Rash present.   Neurological:      General: No focal deficit present.      Mental Status: She is alert.   Psychiatric:         Mood and Affect: Mood normal.         Behavior: Behavior normal.                 Administrative Statements

## 2024-08-12 ENCOUNTER — OFFICE VISIT (OUTPATIENT)
Dept: NEUROLOGY | Facility: CLINIC | Age: 49
End: 2024-08-12
Payer: COMMERCIAL

## 2024-08-12 VITALS
SYSTOLIC BLOOD PRESSURE: 113 MMHG | HEIGHT: 60 IN | BODY MASS INDEX: 23.95 KG/M2 | WEIGHT: 122 LBS | HEART RATE: 78 BPM | DIASTOLIC BLOOD PRESSURE: 75 MMHG

## 2024-08-12 DIAGNOSIS — H93.11 TINNITUS OF RIGHT EAR: ICD-10-CM

## 2024-08-12 DIAGNOSIS — G43.109 MIGRAINE WITH AURA AND WITHOUT STATUS MIGRAINOSUS, NOT INTRACTABLE: ICD-10-CM

## 2024-08-12 DIAGNOSIS — M79.18 MYOFASCIAL PAIN: ICD-10-CM

## 2024-08-12 DIAGNOSIS — G43.709 CHRONIC MIGRAINE WITHOUT AURA WITHOUT STATUS MIGRAINOSUS, NOT INTRACTABLE: Primary | ICD-10-CM

## 2024-08-12 PROCEDURE — 99212 OFFICE O/P EST SF 10 MIN: CPT | Performed by: PHYSICIAN ASSISTANT

## 2024-08-12 NOTE — PROGRESS NOTES
Ambulatory Visit  Name: Dolores Mitchell      : 1975      MRN: [id number]  Encounter Provider: Dorie Priest PA-C  Encounter Date: 2024   Encounter department: NEUROLOGY [facility]    Assessment & Plan   1. Chronic migraine without aura without status migrainosus, not intractable  2. Migraine with aura and without status migrainosus, not intractable  3. Myofascial pain  4. Tinnitus of right ear    Continue botox for migraine prevention.  Since starting botox, the patient reports greater than 7 days of migraine relief from baseline, correlated with headache diary, decreased abortive medication use and decreased ER visits.    Continue 20 mg elavil qhs for migraine prevention, neck pain.  She tried to wean in the past but migraines were worse, so she would prefer to stay on the med at 20 mg at this time. Denies significant s/e.    PRN onset of a migraine- migraine stick with menthol/ rub-on, and/or ibuprofen. Max 3 doses ibuprofen per week to prevent medication overuse headache.    She will let me know if she needs TPIs for cervical myofascial pain R>L.    The patient should not hesitate to call me prior to her follow up with any questions or concerns.        History of Present Illness     Dolores Mitchell is a 48 y.o. female who presents for f/u.  She works for [employer] in [place].    She is here for Botox reauthorization.  Her migraine headaches are significantly reduced with Botox.     She was diagnosed with anterior uveitis in May 2022 and was given a prednisone taper, all labs unremarkable including HLA B27, Ace, CRP and sed rate.  Prior to that blood work in 2021 with a unremarkable ABISAI, TSH, CC P, sabrina autoantibody profile, sed rate, vitamin-D, RF, CRP. Eye sxs are improved.  Since I last saw her she had another issue with her right eye including right eye pain (was not as bad as when she had uveitis though) and a gritty feeling as well as blurred vision.  She saw her ophthalmologist  Dr. 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  The symptoms did improve and she is back to baseline vision, no pain.  Headaches did not worsen.  She has not had recurrent symptoms of uveitis.    Migraines:  HAs R frontotemporal region, and R neck region.   Neck a little better with massage device at home, stretching on a regular basis.  Does not prefer deep tissue massage or acupuncture, has tried these in the past.    What medications do you take or have you taken for your headaches?   Preventive therapy:   - magnesium, B complex, vitamin-D, Turmeric  - amitriptyline 20 mg  - Robaxin 750, Flexeril    Abortive Therapy:   - Ativan  - tylenol  - ibuprofen (Advil, Motrin), naproxen  - Imitrex, migranal  - Maxalt  -- toradol     Headaches started at what age?  8th grade and started her menstruation.   How often do the headaches occur? 1 a month  What time of the day do the headaches start?  Varies  How long do the headaches last?  1 day on average  Are you ever headache free? Yes  Aura/Warning and how long does it last ? Flashing light in the peripheral vision and this would last for 40 minutes and then headache started.   Describe your usual headache?  Throbbing, pressure, burning, shooting, electrical, stabbing.  Patient does state that the stabbing pain that she has is throughout her right occipital parietal and frontal region.  Where is your headache located?  Typically locked on the right side of her head and face  What is the intensity of pain?  Pain can get up to 10/10  Associated symptoms:   Decreased appetite, nausea  Phonophobic and sensitivity to smells  Problems with concentration  Stiff or sore neck, hands or feet tingle or feel numb  Unable to work, prefer a dark and quiet room  Headache are worse if the patient: cough, sneeze, bending over, exertion  Headache triggers:  Lack of sleep will bring on neck pain and exertion, weather changes  What time of the year do  "headaches occur more frequently? do not seem to be related to any time of day or year  Have you had trigger point injection performed and how often? Yes in past  Have you had Botox injection performed and how often? yes q 3 months  Have you had epidural injections or transforaminal injections performed? No     Alternative therapies used in the past for headaches? physical therapy  Have you used CBD or THC for your headaches and how often? Yes in the past     Are you current pregnant or planning on getting pregnant? No     Repeat brain MRI 4/2/24:  \"1. Normal MRI of the brain and internal auditory canals. No cerebellopontine   angle, internal auditory canal, middle ear, or jugular foramen mass identified.   2. Right high riding jugular bulb, which may result in pulsatile tinnitus. If there is clinical concern for jugular bulb dehiscence, CT temporal bones can be performed.   3. MRA demonstrates patent intracranial arteries with no abrupt cut-off, dissection, focal stenosis or aneurysm. Normal course and caliber of bilateral internal carotid arteries, without definite evidence of aberrant internal carotid artery. This can be further evaluated on CT temporal bones as well.   4. No evidence of dural venous sinus thrombosis.\"    CT orbits/ temporal ones/ skull base wo 5/6/24:   \"1.  Mild thickening and slight retraction of the bilateral tympanic membranes, which is nonspecific can be seen in setting of chronic otitis media.   2.  Trace fluid seen within the bilateral mastoid air cells, which is nonspecific.   3. Focal thinning without gross dehiscence along the sigmoid plate bilaterally.   4.  High riding right-sided jugular bulb.\"    Prior notes:  Unfortunately she has had some more migraines with wear off of Botox, and dizzy spells as well, which are lasting longer and more frequent.  She is experiencing ringing in the right ear.  She is not sure if she has a family history of Ménière's or other ENT disorder.    She " states she has dizziness which is random, comes and goes throughout the day or week.  Sometimes it comes on without warning and she feels like she needs to hold onto the chair where she is sitting.  It can occur without moving but it typically occurs with movement of the head and neck or body.  It feels like she is on a boat.  There is a constant high-pitched ringing in the right ear but sometimes in the left.     She is taking off guard with the dizziness at times, she was in the middle of a presentation at work through Zoom and the dizziness occurred and she had to take a slight break but then went back into it after a few moments.     She had a third episode of uveitis and went to see ophthalmology.  She already saw a rheumatology in the past.     She is experiencing neck pain with right greater than left arm pain and weakness in the right arm when the pain is severe.  It is typically associated with the headache.  It is also associated with numbness and tingling in the thumbs, pinkies and sometimes the middle finger on the right.      Review of Systems  Review of Systems   Constitutional:  Negative for appetite change, fatigue and fever.   HENT: Negative.  Negative for hearing loss, tinnitus, trouble swallowing and voice change.    Eyes: Negative.  Negative for photophobia, pain and visual disturbance.   Respiratory: Negative.  Negative for shortness of breath.    Cardiovascular: Negative.  Negative for palpitations.   Gastrointestinal: Negative.  Negative for nausea and vomiting.   Endocrine: Negative.  Negative for cold intolerance.   Genitourinary: Negative.  Negative for dysuria, frequency and urgency.   Musculoskeletal:  Negative for back pain, gait problem, myalgias, neck pain and neck stiffness.   Skin: Negative.  Negative for rash.   Allergic/Immunologic: Negative.    Neurological: Negative.  Negative for dizziness, tremors, seizures, syncope, facial asymmetry, speech difficulty, weakness,  light-headedness, numbness and headaches (1 to 2 headaches a month. Migraine ever few months with pressure changes).   Hematological: Negative.  Does not bruise/bleed easily.   Psychiatric/Behavioral: Negative.  Negative for confusion, hallucinations and sleep disturbance.       The following portions of the patient's history were reviewed and updated as appropriate: allergies, current medications/ medication history, past family history, past medical history, past social history, past surgical history and problem list.    Review of systems was reviewed and otherwise unremarkable from a neurological perspective.      Pertinent Medical History         Medical History Reviewed by provider this encounter:       Past Medical History   Past Medical History:   Diagnosis Date    Allergic rhinitis 4/7/2015    Anal fissure     Anxiety     Batres's esophagus without dysplasia 5/20/2020    Family history of colon cancer in mother 10/19/2018    Gastritis 1/8/2020    GERD (gastroesophageal reflux disease)     History of COVID-19 12/2020    Migraine without aura and without status migrainosus, not intractable 5/17/2019    Reactive arthritis (HCC) 4/29/2021     Past Surgical History:   Procedure Laterality Date    COLONOSCOPY  07/2019    HEMORRHOID SURGERY      OH ANRCT XM SURG REQ ANES GENERAL SPI/EDRL DX N/A 3/26/2020    Procedure: EXAM UNDER ANESTHESIA (EUA), ENDOANAL ULTRASOUND;  Surgeon: Maciej Chavez MD;  Location: [facility];  Service: Colorectal    OH SURG TX ANAL FISTULA SUBQ N/A 3/26/2020    Procedure: FISTULOTOMY;  Surgeon: Maciej Chavez MD;  Location: [facility];  Service: Colorectal    UPPER GASTROINTESTINAL ENDOSCOPY  07/2019    VARICOSE VEIN SURGERY       Family History   Problem Relation Age of Onset    Colon cancer Mother     Coronary artery disease Maternal Grandmother     Coronary artery disease Paternal Grandmother     Hypertension Family     Colon cancer Maternal Grandfather     Hypertension Father      Alcohol abuse Father      Current Outpatient Medications on File Prior to Visit   Medication Sig Dispense Refill    amitriptyline (ELAVIL) 10 mg tablet Take 2 tablets (20 mg total) by mouth daily at bedtime 180 tablet 0    Botox 200 units SOLR INJECT UP  UNITS IN THE MUSCLE INTO HEAD AND NECK AREA EVERY 3 MONTHS 1 each 3    Diclofenac Sodium (VOLTAREN) 1 % Apply 2 g topically 4 (four) times a day 350 g 5    Ivermectin (Soolantra) 1 % CREA Apply topically as needed       metroNIDAZOLE (METROCREAM) 0.75 % cream APPLY TO FACE TWICE A DAY      omeprazole (PriLOSEC) 40 MG capsule TAKE 1 CAPSULE DAILY 90 capsule 3    Na Sulfate-K Sulfate-Mg Sulf 17.5-3.13-1.6 GM/177ML SOLN  (Patient not taking: Reported on 8/12/2024)      triamcinolone (KENALOG) 0.1 % ointment Apply topically 2 (two) times a day (Patient not taking: Reported on 8/12/2024) 30 g 0    [DISCONTINUED] ketorolac (TORADOL) 10 mg tablet Take 1 tablet (10 mg total) by mouth every 6 (six) hours as needed (migraine) Max 2-3 per week. (Patient not taking: Reported on 8/12/2024) 10 tablet 0    [DISCONTINUED] LORazepam (ATIVAN) 0.5 mg tablet 1 tab 2 hours prior to mri, repeat immediately prior to mri prn anxiety. Repeat for second MRI if needed. (Patient not taking: Reported on 8/2/2024) 4 tablet 0    [DISCONTINUED] methocarbamol (ROBAXIN) 500 mg tablet 1 tab qid prn neck pain (Patient not taking: Reported on 8/2/2024) 30 tablet 3     No current facility-administered medications on file prior to visit.     Allergies   Allergen Reactions    Sulfamethoxazole-Trimethoprim Swelling, Rash and Anaphylaxis     Joint pain, neck swelling, rash    Pollen Extract Headache      Current Outpatient Medications on File Prior to Visit   Medication Sig Dispense Refill    amitriptyline (ELAVIL) 10 mg tablet Take 2 tablets (20 mg total) by mouth daily at bedtime 180 tablet 0    Botox 200 units SOLR INJECT UP  UNITS IN THE MUSCLE INTO HEAD AND NECK AREA EVERY 3 MONTHS 1  each 3    Diclofenac Sodium (VOLTAREN) 1 % Apply 2 g topically 4 (four) times a day 350 g 5    Ivermectin (Soolantra) 1 % CREA Apply topically as needed       metroNIDAZOLE (METROCREAM) 0.75 % cream APPLY TO FACE TWICE A DAY      omeprazole (PriLOSEC) 40 MG capsule TAKE 1 CAPSULE DAILY 90 capsule 3    Na Sulfate-K Sulfate-Mg Sulf 17.5-3.13-1.6 GM/177ML SOLN  (Patient not taking: Reported on 2024)      triamcinolone (KENALOG) 0.1 % ointment Apply topically 2 (two) times a day (Patient not taking: Reported on 2024) 30 g 0    [DISCONTINUED] ketorolac (TORADOL) 10 mg tablet Take 1 tablet (10 mg total) by mouth every 6 (six) hours as needed (migraine) Max 2-3 per week. (Patient not taking: Reported on 2024) 10 tablet 0    [DISCONTINUED] LORazepam (ATIVAN) 0.5 mg tablet 1 tab 2 hours prior to mri, repeat immediately prior to mri prn anxiety. Repeat for second MRI if needed. (Patient not taking: Reported on 2024) 4 tablet 0    [DISCONTINUED] methocarbamol (ROBAXIN) 500 mg tablet 1 tab qid prn neck pain (Patient not taking: Reported on 2024) 30 tablet 3     No current facility-administered medications on file prior to visit.      Social History     Tobacco Use    Smoking status: Former     Current packs/day: 0.00     Types: Cigarettes     Quit date:      Years since quittin.6    Smokeless tobacco: Never   Vaping Use    Vaping status: Never Used   Substance and Sexual Activity    Alcohol use: Yes     Comment: Socially     Drug use: No    Sexual activity: Not on file     Objective     /75 (BP Location: Left arm, Patient Position: Sitting, Cuff Size: Standard)   Pulse 78   Ht 5' (1.524 m)   Wt 55.3 kg (122 lb)   LMP 2024   BMI 23.83 kg/m²     Physical Exam  On neurologic exam, the patient is alert and oriented to time and place. Speech is fluent and articulate, and the patient follows commands appropriately. Judgment and affect appear normal. Pupils are equally round and  reactive to light and extraocular muscles are intact without nystagmus. Face is symmetric, and tongue, uvula, and palate are midline. Hearing is intact. Motor examination reveals intact strength throughout. Neck flexors 5/5, no TTP. Normal gait is steady.  Reflexes 2+ in all 4 extremities.      Administrative Statements   I have spent a total time of 10 minutes in caring for this patient on the day of the visit/encounter including Diagnostic results, Instructions for management, Patient and family education, Risk factor reductions, Impressions, Counseling / Coordination of care, Documenting in the medical record, Reviewing / ordering tests, medicine, procedures  , and Obtaining or reviewing history  .

## 2024-08-12 NOTE — PROGRESS NOTES
Review of Systems   Constitutional:  Negative for appetite change, fatigue and fever.   HENT: Negative.  Negative for hearing loss, tinnitus, trouble swallowing and voice change.    Eyes: Negative.  Negative for photophobia, pain and visual disturbance.   Respiratory: Negative.  Negative for shortness of breath.    Cardiovascular: Negative.  Negative for palpitations.   Gastrointestinal: Negative.  Negative for nausea and vomiting.   Endocrine: Negative.  Negative for cold intolerance.   Genitourinary: Negative.  Negative for dysuria, frequency and urgency.   Musculoskeletal:  Negative for back pain, gait problem, myalgias, neck pain and neck stiffness.   Skin: Negative.  Negative for rash.   Allergic/Immunologic: Negative.    Neurological: Negative.  Negative for dizziness, tremors, seizures, syncope, facial asymmetry, speech difficulty, weakness, light-headedness, numbness and headaches (1 to 2 headaches a month. Migraine ever few months with pressure changes).   Hematological: Negative.  Does not bruise/bleed easily.   Psychiatric/Behavioral: Negative.  Negative for confusion, hallucinations and sleep disturbance.

## 2024-10-10 ENCOUNTER — PROCEDURE VISIT (OUTPATIENT)
Dept: NEUROLOGY | Facility: CLINIC | Age: 49
End: 2024-10-10
Payer: COMMERCIAL

## 2024-10-10 VITALS — TEMPERATURE: 98.8 F | HEART RATE: 75 BPM | DIASTOLIC BLOOD PRESSURE: 65 MMHG | SYSTOLIC BLOOD PRESSURE: 133 MMHG

## 2024-10-10 DIAGNOSIS — G43.709 CHRONIC MIGRAINE WITHOUT AURA WITHOUT STATUS MIGRAINOSUS, NOT INTRACTABLE: Primary | ICD-10-CM

## 2024-10-10 DIAGNOSIS — G43.009 MIGRAINE WITHOUT AURA AND WITHOUT STATUS MIGRAINOSUS, NOT INTRACTABLE: ICD-10-CM

## 2024-10-10 PROCEDURE — 64615 CHEMODENERV MUSC MIGRAINE: CPT | Performed by: PHYSICIAN ASSISTANT

## 2024-10-10 RX ORDER — AMITRIPTYLINE HYDROCHLORIDE 10 MG/1
20 TABLET ORAL
Qty: 180 TABLET | Refills: 2 | Status: SHIPPED | OUTPATIENT
Start: 2024-10-10

## 2024-10-10 NOTE — PROGRESS NOTES
Universal Protocol   Consent: Verbal consent obtained. Written consent obtained.  Risks and benefits: risks, benefits and alternatives were discussed  Consent given by: patient  Patient understanding: patient states understanding of the procedure being performed  Patient consent: the patient's understanding of the procedure matches consent given  Procedure consent: procedure consent matches procedure scheduled      Chemodenervation     Date/Time  10/10/2024 3:00 PM     Performed by  Dorie Priest PA-C   Authorized by  Dorie Priest PA-C     Pre-procedure details      Prepped With: Alcohol     Procedure details      Position:  Upright   Botox      Botox Type:  Type A    Brand:  Botox    mL's of Botulinum Toxin:  200    Final Concentration per CC:  100 units    Needle Gauge:  30 G 2.5 inch   Procedures      Botox Procedures: chronic headache      Indications: migraines     Injection Location      Head / Face:  L superior trapezius, R superior trapezius, L superior cervical paraspinal, R superior cervical paraspinal, L , R , procerus, L temporalis, R temporalis, R frontalis, L frontalis, R medial occipitalis and L medial occipitalis    L  injection amount:  5 unit(s)    R  injection amount:  5 unit(s)    L lateral frontalis:  5 unit(s)    R lateral frontalis:  5 unit(s)    L medial frontalis:  0 unit(s)    R medial frontalis:  0 unit(s)    L temporalis injection amount:  20 unit(s)    R temporalis injection amount:  20 unit(s)    Procerus injection amount:  5 unit(s)    L medial occipitalis injection amount:  15 unit(s)    R medial occipitalis injection amount:  15 unit(s)    L superior cervical paraspinal injection amount:  10 unit(s)    R superior cervical paraspinal injection amount:  10 unit(s)    L superior trapezius injection amount:  15 unit(s)    R superior trapezius injection amount:  15 unit(s)   Total Units      Total units used:  200    Total units discarded:  0    Post-procedure details      Chemodenervation:  Chronic migraine    Facial Nerve Location::  Bilateral facial nerve    Patient tolerance of procedure:  Tolerated well, no immediate complications   Comments       Extra units medically necessary:  10 units between each splenius capitis  Parietal regions b/l- 10 each side (20 total)  Orbicularis oculi bilaterally- 2.5 each side (5 total)  20 units between both middle traps (R>L)     Fewer units medial frontalis as above.  Pt supine for front injections, otherwise sitting upright.     Blood pressure 133/65, pulse 75, temperature 98.8 °F (37.1 °C), temperature source Temporal.

## 2024-11-03 DIAGNOSIS — H16.9 KERATITIS: Primary | ICD-10-CM

## 2024-11-03 DIAGNOSIS — E78.5 DYSLIPIDEMIA: ICD-10-CM

## 2024-11-14 ENCOUNTER — TELEPHONE (OUTPATIENT)
Dept: NEUROLOGY | Facility: CLINIC | Age: 49
End: 2024-11-14

## 2024-11-14 NOTE — TELEPHONE ENCOUNTER
Received a fax form for patient on 11/14/2024 at 9:47 am from [facility] for a script for Botox

## 2024-11-25 ENCOUNTER — TELEPHONE (OUTPATIENT)
Age: 49
End: 2024-11-25

## 2025-01-02 RX ORDER — ACYCLOVIR 400 MG/1
400 TABLET ORAL 2 TIMES DAILY
COMMUNITY
Start: 2024-11-01

## 2025-01-02 RX ORDER — TRIFLURIDINE 10 MG/ML
1 SOLUTION OPHTHALMIC 2 TIMES DAILY
COMMUNITY
Start: 2024-11-01

## 2025-01-02 RX ORDER — OFLOXACIN 3 MG/ML
1 SOLUTION/ DROPS OPHTHALMIC 4 TIMES DAILY
COMMUNITY
Start: 2024-10-29

## 2025-01-02 RX ORDER — SODIUM CHLORIDE 5 %
1 OINTMENT (GRAM) OPHTHALMIC (EYE)
COMMUNITY
Start: 2024-11-01

## 2025-01-03 ENCOUNTER — OFFICE VISIT (OUTPATIENT)
Dept: FAMILY MEDICINE CLINIC | Facility: CLINIC | Age: 50
End: 2025-01-03
Payer: COMMERCIAL

## 2025-01-03 VITALS
WEIGHT: 127 LBS | BODY MASS INDEX: 24.94 KG/M2 | HEART RATE: 83 BPM | HEIGHT: 60 IN | OXYGEN SATURATION: 99 % | DIASTOLIC BLOOD PRESSURE: 80 MMHG | SYSTOLIC BLOOD PRESSURE: 122 MMHG

## 2025-01-03 DIAGNOSIS — Z00.00 ANNUAL PHYSICAL EXAM: Primary | ICD-10-CM

## 2025-01-03 DIAGNOSIS — M02.30 REACTIVE ARTHRITIS, UNSPECIFIED SITE (HCC): ICD-10-CM

## 2025-01-03 DIAGNOSIS — M77.11 LATERAL EPICONDYLITIS OF RIGHT ELBOW: ICD-10-CM

## 2025-01-03 PROCEDURE — 99396 PREV VISIT EST AGE 40-64: CPT | Performed by: FAMILY MEDICINE

## 2025-01-03 NOTE — PROGRESS NOTES
Adult Annual Physical  Name: Dolores Mitchell      : 1975      MRN: [id number]  Encounter Provider: Nazia Amaya MD  Encounter Date: 1/3/2025   Encounter department: [facility]    Assessment & Plan  Annual physical exam  - Satisfactory physical examination   - Immunizations and preventive care screenings were discussed with patient today.   - Patient scheduled for mammogram and will make appointment with GYN  - Patient scheduled to see Gastroenterology in a few weeks   - Follow up in one year or as needed       Reactive arthritis, unspecified site (HCC)  - Stable; previously following with Rheumatology        Lateral epicondylitis of right elbow    Orders:    Ambulatory Referral to Orthopedic Surgery; Future             History of Present Illness   Dolores Mitchell is a very pleasant 49 year old female who presents today for a physical. Overall she feels well although admits that the last few months have difficult. Her father passed away suddenly in the fall and her mother in law also had to come and live with her. She was seeing ophthalmology for dendritic keratitis and now remains on eye drops. She continues to have elbow pain. She had been referred to Orthopedics and per chart review that had tried to contact her regarding an appointment.     Adult Annual Physical:  Patient presents for annual physical.     Diet and Physical Activity:  - Diet/Nutrition: well balanced diet.  - Exercise: moderate cardiovascular exercise.    General Health:  - Sleep: sleeps well.  - Hearing: tinnitus and decreased hearing bilateral ears.  - Vision: goes for regular eye exams.  - Dental: regular dental visits.    /GYN Health:  - Follows with GYN: yes.     Review of Systems   Constitutional: Negative.    HENT: Negative.     Eyes: Negative.    Respiratory: Negative.     Cardiovascular: Negative.    Gastrointestinal: Negative.    Genitourinary: Negative.    Musculoskeletal: Negative.    Skin: Negative.    Neurological:  Negative.    Psychiatric/Behavioral: Negative.           Objective   /80 (BP Location: Left arm, Patient Position: Sitting, Cuff Size: Adult)   Pulse 83   Ht 5' (1.524 m)   Wt 57.6 kg (127 lb)   LMP 12/23/2024 (Approximate)   SpO2 99%   BMI 24.80 kg/m²     Physical Exam  Constitutional:       General: She is not in acute distress.     Appearance: She is not ill-appearing.   HENT:      Head: Normocephalic and atraumatic.   Eyes:      General:         Right eye: No discharge.         Left eye: No discharge.      Extraocular Movements: Extraocular movements intact.   Cardiovascular:      Rate and Rhythm: Normal rate.   Pulmonary:      Effort: Pulmonary effort is normal. No respiratory distress.      Breath sounds: No wheezing.   Abdominal:      General: Bowel sounds are normal.      Palpations: Abdomen is soft.      Tenderness: There is no abdominal tenderness.   Musculoskeletal:      Right lower leg: No edema.      Left lower leg: No edema.   Neurological:      General: No focal deficit present.      Mental Status: She is alert.      Motor: No weakness.      Coordination: Coordination normal.      Gait: Gait normal.      Deep Tendon Reflexes: Reflexes normal.   Psychiatric:         Mood and Affect: Mood normal.         Behavior: Behavior normal.

## 2025-01-13 ENCOUNTER — OFFICE VISIT (OUTPATIENT)
Dept: GASTROENTEROLOGY | Facility: MEDICAL CENTER | Age: 50
End: 2025-01-13
Payer: COMMERCIAL

## 2025-01-13 ENCOUNTER — PROCEDURE VISIT (OUTPATIENT)
Dept: NEUROLOGY | Facility: CLINIC | Age: 50
End: 2025-01-13
Payer: COMMERCIAL

## 2025-01-13 ENCOUNTER — TELEPHONE (OUTPATIENT)
Dept: GASTROENTEROLOGY | Facility: MEDICAL CENTER | Age: 50
End: 2025-01-13

## 2025-01-13 ENCOUNTER — APPOINTMENT (OUTPATIENT)
Dept: LAB | Facility: CLINIC | Age: 50
End: 2025-01-13
Payer: COMMERCIAL

## 2025-01-13 VITALS — DIASTOLIC BLOOD PRESSURE: 83 MMHG | HEART RATE: 72 BPM | TEMPERATURE: 97.6 F | SYSTOLIC BLOOD PRESSURE: 130 MMHG

## 2025-01-13 VITALS
TEMPERATURE: 98.3 F | HEIGHT: 60 IN | BODY MASS INDEX: 25.13 KG/M2 | SYSTOLIC BLOOD PRESSURE: 115 MMHG | HEART RATE: 76 BPM | WEIGHT: 128 LBS | DIASTOLIC BLOOD PRESSURE: 78 MMHG

## 2025-01-13 DIAGNOSIS — G43.709 CHRONIC MIGRAINE WITHOUT AURA WITHOUT STATUS MIGRAINOSUS, NOT INTRACTABLE: Primary | ICD-10-CM

## 2025-01-13 DIAGNOSIS — K62.5 RECTAL BLEEDING: ICD-10-CM

## 2025-01-13 DIAGNOSIS — Z80.0 FAMILY HISTORY OF LYNCH SYNDROME: ICD-10-CM

## 2025-01-13 DIAGNOSIS — Z80.0 FAMILY HISTORY OF COLON CANCER: ICD-10-CM

## 2025-01-13 DIAGNOSIS — K21.9 GASTROESOPHAGEAL REFLUX DISEASE, UNSPECIFIED WHETHER ESOPHAGITIS PRESENT: ICD-10-CM

## 2025-01-13 DIAGNOSIS — H16.9 KERATITIS: ICD-10-CM

## 2025-01-13 DIAGNOSIS — E78.5 DYSLIPIDEMIA: ICD-10-CM

## 2025-01-13 DIAGNOSIS — K22.70 BARRETT'S ESOPHAGUS WITHOUT DYSPLASIA: ICD-10-CM

## 2025-01-13 DIAGNOSIS — K59.00 CONSTIPATION, UNSPECIFIED CONSTIPATION TYPE: Primary | ICD-10-CM

## 2025-01-13 DIAGNOSIS — K31.A0 GASTRIC INTESTINAL METAPLASIA: ICD-10-CM

## 2025-01-13 LAB
ALBUMIN SERPL BCG-MCNC: 4.1 G/DL (ref 3.5–5)
ALP SERPL-CCNC: 28 U/L (ref 34–104)
ALT SERPL W P-5'-P-CCNC: 15 U/L (ref 7–52)
ANION GAP SERPL CALCULATED.3IONS-SCNC: 7 MMOL/L (ref 4–13)
AST SERPL W P-5'-P-CCNC: 18 U/L (ref 13–39)
BASOPHILS # BLD AUTO: 0.03 THOUSANDS/ΜL (ref 0–0.1)
BASOPHILS NFR BLD AUTO: 1 % (ref 0–1)
BILIRUB SERPL-MCNC: 0.53 MG/DL (ref 0.2–1)
BUN SERPL-MCNC: 16 MG/DL (ref 5–25)
CALCIUM SERPL-MCNC: 8.8 MG/DL (ref 8.4–10.2)
CHLORIDE SERPL-SCNC: 104 MMOL/L (ref 96–108)
CHOLEST SERPL-MCNC: 232 MG/DL (ref ?–200)
CO2 SERPL-SCNC: 28 MMOL/L (ref 21–32)
CREAT SERPL-MCNC: 0.85 MG/DL (ref 0.6–1.3)
EOSINOPHIL # BLD AUTO: 0.13 THOUSAND/ΜL (ref 0–0.61)
EOSINOPHIL NFR BLD AUTO: 3 % (ref 0–6)
ERYTHROCYTE [DISTWIDTH] IN BLOOD BY AUTOMATED COUNT: 12 % (ref 11.6–15.1)
GFR SERPL CREATININE-BSD FRML MDRD: 81 ML/MIN/1.73SQ M
GLUCOSE P FAST SERPL-MCNC: 94 MG/DL (ref 65–99)
HCT VFR BLD AUTO: 36.2 % (ref 34.8–46.1)
HDLC SERPL-MCNC: 69 MG/DL
HGB BLD-MCNC: 12 G/DL (ref 11.5–15.4)
IMM GRANULOCYTES # BLD AUTO: 0.01 THOUSAND/UL (ref 0–0.2)
IMM GRANULOCYTES NFR BLD AUTO: 0 % (ref 0–2)
LDLC SERPL CALC-MCNC: 148 MG/DL (ref 0–100)
LYMPHOCYTES # BLD AUTO: 1.68 THOUSANDS/ΜL (ref 0.6–4.47)
LYMPHOCYTES NFR BLD AUTO: 36 % (ref 14–44)
MCH RBC QN AUTO: 30.7 PG (ref 26.8–34.3)
MCHC RBC AUTO-ENTMCNC: 33.1 G/DL (ref 31.4–37.4)
MCV RBC AUTO: 93 FL (ref 82–98)
MONOCYTES # BLD AUTO: 0.46 THOUSAND/ΜL (ref 0.17–1.22)
MONOCYTES NFR BLD AUTO: 10 % (ref 4–12)
NEUTROPHILS # BLD AUTO: 2.41 THOUSANDS/ΜL (ref 1.85–7.62)
NEUTS SEG NFR BLD AUTO: 50 % (ref 43–75)
NRBC BLD AUTO-RTO: 0 /100 WBCS
PLATELET # BLD AUTO: 271 THOUSANDS/UL (ref 149–390)
PMV BLD AUTO: 10.6 FL (ref 8.9–12.7)
POTASSIUM SERPL-SCNC: 3.9 MMOL/L (ref 3.5–5.3)
PROT SERPL-MCNC: 6.4 G/DL (ref 6.4–8.4)
RBC # BLD AUTO: 3.91 MILLION/UL (ref 3.81–5.12)
SODIUM SERPL-SCNC: 139 MMOL/L (ref 135–147)
TRIGL SERPL-MCNC: 73 MG/DL (ref ?–150)
WBC # BLD AUTO: 4.72 THOUSAND/UL (ref 4.31–10.16)

## 2025-01-13 PROCEDURE — 80053 COMPREHEN METABOLIC PANEL: CPT

## 2025-01-13 PROCEDURE — 99204 OFFICE O/P NEW MOD 45 MIN: CPT | Performed by: INTERNAL MEDICINE

## 2025-01-13 PROCEDURE — 85025 COMPLETE CBC W/AUTO DIFF WBC: CPT

## 2025-01-13 PROCEDURE — 80061 LIPID PANEL: CPT

## 2025-01-13 PROCEDURE — 36415 COLL VENOUS BLD VENIPUNCTURE: CPT

## 2025-01-13 PROCEDURE — 64615 CHEMODENERV MUSC MIGRAINE: CPT | Performed by: PHYSICIAN ASSISTANT

## 2025-01-13 RX ORDER — SODIUM CHLORIDE, SODIUM LACTATE, POTASSIUM CHLORIDE, CALCIUM CHLORIDE 600; 310; 30; 20 MG/100ML; MG/100ML; MG/100ML; MG/100ML
125 INJECTION, SOLUTION INTRAVENOUS CONTINUOUS
Status: CANCELLED | OUTPATIENT
Start: 2025-01-13

## 2025-01-13 NOTE — TELEPHONE ENCOUNTER
Scheduled date of Colonoscopy and EGD (as of today) January 24  Physician performing: Dr. Jaiyeola  Location of procedure:  [facility]  Instructions given to patient: Miralax  Clearances: None  Diabetic: No

## 2025-01-13 NOTE — H&P (VIEW-ONLY)
Name: Dolores Mitchell      : 1975      MRN: [id number]  Encounter Provider: Diana M Jaiyeola, MD  Encounter Date: 2025   Encounter department: [facility]  :  Assessment & Plan  Constipation, unspecified constipation type  She has a history of constipation with associated straining and notes episodes of rectal bleeding.  We discussed constipation management clued high-fiber diet, fiber supplementation and adequate hydration.  I recommend increasing her MiraLAX to twice daily if she has ongoing symptoms can consider initiation of low-dose Linzess or Amitiza.  We discussed etiology of her rectal bleeding may be due to hemorrhoids or rectal fissure will be assessed at the time of her colonoscopy.  She reports complicated vaginal deliveries in the past we discussed that there may be an element of pelvic floor dysfunction as well she may require referral to pelvic physical therapy in the future       Rectal bleeding    Orders:    Colonoscopy; Future    Batres's esophagus without dysplasia  Her GE junction biopsies in  were positive for intestinal metaplasia of the GE junction consistent with Batres's esophagus.  Her repeat GE junction biopsies in  were negative for Batres's esophagus.  She will be scheduled for repeat EGD for surveillance and if biopsies are again consistent with short segment Batres's esophagus repeat EGD in 5 years and continue PPI indefinitely     Gastric intestinal metaplasia  She previously underwent an EGD  showing prominent intestinal metaplasia of the stomach.  Her repeat EGD biopsies in  were negative for gastric intestinal metaplasia.  She was recommended repeat EGD in 3 years and is due for surveillance biopsies.  She has no family history of gastric cancer.   Orders:    EGD; Future    Gastroesophageal reflux disease, unspecified whether esophagitis present         Family history of Ward syndrome  She reports that her mother  was diagnosed with colon cancer at age 63 and she also has a family history of her maternal grandfather with colon cancer.  She states that her mother may have had Ward syndrome and I recommend scheduling appointment with genetics to discuss genetic testing.    Orders:    Ambulatory Referral to Oncology Genetics; Future    Family history of colon cancer    Orders:    Colonoscopy; Future        History of Present Illness   HPI  Dolores Mitchell is a 49 y.o. female who presents for evaluation.  She has a history of chronic migraines and GERD    She was previously seen in the GI office in 2021 for history of prior anal fissure, IBS, gastric intestinal metaplasia and Batres's esophagus.    She reports eating a high-fiber diet and drinking a lot of water during the day with regular exercise.  She takes MiraLAX once daily and usually has a bowel movement once daily but can have episodes of missing 2 days without a bowel movement and then associated straining with bright red blood per rectum mixed with the stool.  She denies passage of clot or overt hematochezia.  She also reports abdominal bloating.  She does not use any additional over-the-counter laxatives regularly for her bowel regimen  Reports a family history of her mother diagnosed with colon cancer at age 63 and a questionable history of Ward syndrome as well when her tumor was tested.  She has a family history of her maternal grandfather as well with colon cancer    She reports a history of gastroesophageal reflux for which she uses omeprazole daily with good relief of her symptoms  She has no prior GI surgical history  She has no recent CBC or hepatic function panel available for review  She underwent exam under anesthesia in 2020 showing left posterior lateral anterior sphincteric fullness on EUS and was found to have a fistula and underwent a primary fistulotomy  She has no recent abdominal imaging available for review      2021 EGD was normal.  Gastric biopsy  showed gastritis negative for intestinal metaplasia and esophagus with chronic inflammatory change which was negative for Batres's esophagus.  She was recommended repeat in 3 years  2019 EGD showed irregular Z-line, duodenal biopsies were normal and gastric biopsy showed gastric intestinal metaplasia.  GE junction biopsies also showed intestinal metaplasia  2019 colonoscopy was normal with external anal fissure, terminal ileum and colonic biopsies were normal.  She was recommended repeat at age 51  2018 colonoscopy showed ulcers in the terminal ileum and possible focal colitis of the descending colon  2014 colonoscopy was normal  2011 colonoscopy was normal      Review of Systems   Constitutional:  Negative for chills and fever.   HENT:  Negative for ear pain and sore throat.    Eyes:  Negative for pain and visual disturbance.   Respiratory:  Negative for cough and shortness of breath.    Cardiovascular:  Negative for chest pain and palpitations.   Gastrointestinal:  Negative for abdominal pain and vomiting.   Genitourinary:  Negative for dysuria and hematuria.   Musculoskeletal:  Negative for arthralgias and back pain.   Skin:  Negative for color change and rash.   Neurological:  Negative for seizures and syncope.   All other systems reviewed and are negative.         Objective   /78 (BP Location: Right arm, Patient Position: Sitting, Cuff Size: Standard)   Pulse 76   Temp 98.3 °F (36.8 °C) (Tympanic)   Ht 5' (1.524 m)   Wt 58.1 kg (128 lb)   LMP 12/23/2024 (Approximate)   BMI 25.00 kg/m²      Physical Exam  Vitals and nursing note reviewed.   Constitutional:       General: She is not in acute distress.     Appearance: She is well-developed.   HENT:      Head: Normocephalic and atraumatic.   Eyes:      Conjunctiva/sclera: Conjunctivae normal.   Cardiovascular:      Rate and Rhythm: Normal rate and regular rhythm.      Heart sounds: No murmur heard.  Pulmonary:      Effort: Pulmonary effort is normal.  No respiratory distress.      Breath sounds: Normal breath sounds.   Abdominal:      Palpations: Abdomen is soft.      Tenderness: There is no abdominal tenderness.   Musculoskeletal:         General: No swelling.      Cervical back: Neck supple.   Skin:     General: Skin is warm and dry.      Capillary Refill: Capillary refill takes less than 2 seconds.   Neurological:      Mental Status: She is alert.   Psychiatric:         Mood and Affect: Mood normal.

## 2025-01-13 NOTE — PROGRESS NOTES
Universal Protocol   Consent: Verbal consent obtained. Written consent obtained.  Risks and benefits: risks, benefits and alternatives were discussed  Consent given by: patient  Patient understanding: patient states understanding of the procedure being performed  Patient consent: the patient's understanding of the procedure matches consent given  Procedure consent: procedure consent matches procedure scheduled      Chemodenervation     Date/Time  1/13/2025 12:00 PM     Performed by  Dorie Priest PA-C   Authorized by  Dorie Priest PA-C     Pre-procedure details      Prepped With: Alcohol     Procedure details      Position:  Upright   Botox      Botox Type:  Type A    Brand:  Botox    mL's of Botulinum Toxin:  200    Final Concentration per CC:  100 units    Needle Gauge:  30 G 2.5 inch   Procedures      Botox Procedures: chronic headache      Indications: migraines     Injection Location      Head / Face:  L superior trapezius, R superior trapezius, L superior cervical paraspinal, R superior cervical paraspinal, L , R , procerus, L temporalis, R temporalis, R frontalis, L frontalis, R medial occipitalis and L medial occipitalis    L  injection amount:  5 unit(s)    R  injection amount:  5 unit(s)    L lateral frontalis:  5 unit(s)    R lateral frontalis:  5 unit(s)    L medial frontalis:  0 unit(s)    R medial frontalis:  0 unit(s)    Comments:  No U    L temporalis injection amount:  20 unit(s)    R temporalis injection amount:  20 unit(s)    Procerus injection amount:  5 unit(s)    L medial occipitalis injection amount:  15 unit(s)    R medial occipitalis injection amount:  15 unit(s)    L superior cervical paraspinal injection amount:  10 unit(s)    R superior cervical paraspinal injection amount:  10 unit(s)    L superior trapezius injection amount:  15 unit(s)    R superior trapezius injection amount:  15 unit(s)   Total Units      Total units used:  200    Total  units discarded:  0   Post-procedure details      Chemodenervation:  Chronic migraine    Facial Nerve Location::  Bilateral facial nerve    Patient tolerance of procedure:  Tolerated well, no immediate complications   Comments       Extra units medically necessary:  10 units between each splenius capitis  Parietal regions b/l- 10 each side (20 total)  Orbicularis oculi bilaterally- 2.5 each side (5 total)  20 units between both middle traps (R>L)     No units medial frontalis as above.  Pt supine for front injections, otherwise sitting upright.       Blood pressure 130/83, pulse 72, temperature 97.6 °F (36.4 °C), temperature source Temporal, last menstrual period 12/23/2024.    Pt is going to try to wean down elavil, takes 20 mg, going to decrease to 10 mg qhs x 1 week, then stop if possible. Will let me know.

## 2025-01-13 NOTE — PROGRESS NOTES
Name: Dolores Mitchell      : 1975      MRN: [id number]  Encounter Provider: Diana M Jaiyeola, MD  Encounter Date: 2025   Encounter department: [facility]  :  Assessment & Plan  Constipation, unspecified constipation type  She has a history of constipation with associated straining and notes episodes of rectal bleeding.  We discussed constipation management clued high-fiber diet, fiber supplementation and adequate hydration.  I recommend increasing her MiraLAX to twice daily if she has ongoing symptoms can consider initiation of low-dose Linzess or Amitiza.  We discussed etiology of her rectal bleeding may be due to hemorrhoids or rectal fissure will be assessed at the time of her colonoscopy.  She reports complicated vaginal deliveries in the past we discussed that there may be an element of pelvic floor dysfunction as well she may require referral to pelvic physical therapy in the future       Rectal bleeding    Orders:    Colonoscopy; Future    Batres's esophagus without dysplasia  Her E junction biopsies in  were positive for intestinal metaplasia of the GE junction consistent with Batres's esophagus.  Her repeat GE junction biopsies in  were negative for Batres's esophagus.  She will be scheduled for repeat EGD for surveillance and if biopsies are again consistent with short segment Batres's esophagus repeat EGD in 5 years and continue PPI indefinite     Gastric intestinal metaplasia  She previously underwent an EGD  showing prominent intestinal metaplasia of the stomach.  Her repeat EGD biopsies in  were negative for gastric intestinal metaplasia.  She was recommended repeat EGD in 3 years and is due for surveillance biopsies.  She has no family history of gastric cancer.   Orders:    EGD; Future    Gastroesophageal reflux disease, unspecified whether esophagitis present         Family history of Ward syndrome  She reports that her mother was  diagnosed with colon cancer at age 63 and she also has a family history of her maternal grandfather with colon cancer.  She states that her mother may have had Ward syndrome and I recommend scheduling appointment with genetics to discuss genetic testing.    Orders:    Ambulatory Referral to Oncology Genetics; Future    Family history of colon cancer    Orders:    Colonoscopy; Future        History of Present Illness   HPI  Dolores Mitchell is a 49 y.o. female who presents for evaluation.  She has a history of chronic migraines and GERD    She was previously seen in the GI office in 2021 for history of prior anal fissure, IBS, gastric intestinal metaplasia and Batres's esophagus.    She reports eating a high-fiber diet and drinking a lot of water during the day with regular exercise.  She takes MiraLAX once daily and usually has a bowel movement once daily but can have episodes of missing 2 days without a bowel movement and then associated straining with bright red blood per rectum mixed with the stool.  She denies passage of clot or overt hematochezia.  She also reports abdominal bloating.  She does not use any additional over-the-counter laxatives regularly for her bowel regimen  Reports a family history of her mother diagnosed with colon cancer at age 63 and a questionable history of Ward syndrome as well when her tumor was tested.  She has a family history of her maternal grandfather as well with colon cancer    She reports a history of gastroesophageal reflux for which she uses omeprazole daily with good relief of her symptoms  She has no prior GI surgical history  She has no recent CBC or hepatic function panel available for review  She underwent exam under anesthesia in 2020 showing left posterior lateral anterior sphincteric fullness on EUS and was found to have a fistula and underwent a primary fistulotomy  She has no recent abdominal imaging available for review      2021 EGD was normal.  Gastric biopsy showed  gastritis negative for intestinal metaplasia and esophagus with chronic inflammatory change which was negative for Batres's esophagus.  She was recommended repeat in 3 years  2019 EGD showed irregular Z-line, duodenal biopsies were normal and gastric biopsy showed gastric intestinal metaplasia.  GE junction biopsies also showed intestinal metaplasia  2019 colonoscopy was normal with external anal fissure, terminal ileum and colonic biopsies were normal.  She was recommended repeat at age 51  2018 colonoscopy showed ulcers in the terminal ileum and possible focal colitis of the descending colon  2014 colonoscopy was normal  2011 colonoscopy was normal      Review of Systems   Constitutional:  Negative for chills and fever.   HENT:  Negative for ear pain and sore throat.    Eyes:  Negative for pain and visual disturbance.   Respiratory:  Negative for cough and shortness of breath.    Cardiovascular:  Negative for chest pain and palpitations.   Gastrointestinal:  Negative for abdominal pain and vomiting.   Genitourinary:  Negative for dysuria and hematuria.   Musculoskeletal:  Negative for arthralgias and back pain.   Skin:  Negative for color change and rash.   Neurological:  Negative for seizures and syncope.   All other systems reviewed and are negative.         Objective   /78 (BP Location: Right arm, Patient Position: Sitting, Cuff Size: Standard)   Pulse 76   Temp 98.3 °F (36.8 °C) (Tympanic)   Ht 5' (1.524 m)   Wt 58.1 kg (128 lb)   LMP 12/23/2024 (Approximate)   BMI 25.00 kg/m²      Physical Exam  Vitals and nursing note reviewed.   Constitutional:       General: She is not in acute distress.     Appearance: She is well-developed.   HENT:      Head: Normocephalic and atraumatic.   Eyes:      Conjunctiva/sclera: Conjunctivae normal.   Cardiovascular:      Rate and Rhythm: Normal rate and regular rhythm.      Heart sounds: No murmur heard.  Pulmonary:      Effort: Pulmonary effort is normal. No  respiratory distress.      Breath sounds: Normal breath sounds.   Abdominal:      Palpations: Abdomen is soft.      Tenderness: There is no abdominal tenderness.   Musculoskeletal:         General: No swelling.      Cervical back: Neck supple.   Skin:     General: Skin is warm and dry.      Capillary Refill: Capillary refill takes less than 2 seconds.   Neurological:      Mental Status: She is alert.   Psychiatric:         Mood and Affect: Mood normal.

## 2025-01-13 NOTE — ASSESSMENT & PLAN NOTE
Her E junction biopsies in 2019 were positive for intestinal metaplasia of the GE junction consistent with Batres's esophagus.  Her repeat GE junction biopsies in 2021 were negative for Batres's esophagus.  She will be scheduled for repeat EGD for surveillance and if biopsies are again consistent with short segment Batres's esophagus repeat EGD in 5 years and continue PPI indefinite

## 2025-01-16 ENCOUNTER — ANESTHESIA EVENT (OUTPATIENT)
Dept: ANESTHESIOLOGY | Facility: HOSPITAL | Age: 50
End: 2025-01-16

## 2025-01-16 ENCOUNTER — RESULTS FOLLOW-UP (OUTPATIENT)
Dept: FAMILY MEDICINE CLINIC | Facility: CLINIC | Age: 50
End: 2025-01-16

## 2025-01-16 ENCOUNTER — ANESTHESIA (OUTPATIENT)
Dept: ANESTHESIOLOGY | Facility: HOSPITAL | Age: 50
End: 2025-01-16

## 2025-01-24 ENCOUNTER — ANESTHESIA EVENT (OUTPATIENT)
Dept: GASTROENTEROLOGY | Facility: MEDICAL CENTER | Age: 50
End: 2025-01-24
Payer: COMMERCIAL

## 2025-01-24 ENCOUNTER — ANESTHESIA (OUTPATIENT)
Dept: GASTROENTEROLOGY | Facility: MEDICAL CENTER | Age: 50
End: 2025-01-24
Payer: COMMERCIAL

## 2025-01-24 ENCOUNTER — HOSPITAL ENCOUNTER (OUTPATIENT)
Dept: GASTROENTEROLOGY | Facility: MEDICAL CENTER | Age: 50
Setting detail: OUTPATIENT SURGERY
End: 2025-01-24
Payer: COMMERCIAL

## 2025-01-24 VITALS
TEMPERATURE: 97.7 F | OXYGEN SATURATION: 98 % | HEIGHT: 60 IN | SYSTOLIC BLOOD PRESSURE: 116 MMHG | DIASTOLIC BLOOD PRESSURE: 77 MMHG | WEIGHT: 123 LBS | RESPIRATION RATE: 20 BRPM | HEART RATE: 92 BPM | BODY MASS INDEX: 24.15 KG/M2

## 2025-01-24 DIAGNOSIS — Z80.0 FAMILY HISTORY OF COLON CANCER: ICD-10-CM

## 2025-01-24 DIAGNOSIS — K62.5 RECTAL BLEEDING: ICD-10-CM

## 2025-01-24 DIAGNOSIS — K31.A0 GASTRIC INTESTINAL METAPLASIA: ICD-10-CM

## 2025-01-24 LAB
EXT PREGNANCY TEST URINE: NEGATIVE
EXT. CONTROL: NORMAL

## 2025-01-24 PROCEDURE — 43239 EGD BIOPSY SINGLE/MULTIPLE: CPT | Performed by: INTERNAL MEDICINE

## 2025-01-24 PROCEDURE — 81025 URINE PREGNANCY TEST: CPT | Performed by: ANESTHESIOLOGY

## 2025-01-24 PROCEDURE — 45378 DIAGNOSTIC COLONOSCOPY: CPT | Performed by: INTERNAL MEDICINE

## 2025-01-24 PROCEDURE — 43251 EGD REMOVE LESION SNARE: CPT | Performed by: INTERNAL MEDICINE

## 2025-01-24 PROCEDURE — 88305 TISSUE EXAM BY PATHOLOGIST: CPT | Performed by: PATHOLOGY

## 2025-01-24 RX ORDER — PROPOFOL 10 MG/ML
INJECTION, EMULSION INTRAVENOUS AS NEEDED
Status: DISCONTINUED | OUTPATIENT
Start: 2025-01-24 | End: 2025-01-24

## 2025-01-24 RX ORDER — SODIUM CHLORIDE, SODIUM LACTATE, POTASSIUM CHLORIDE, CALCIUM CHLORIDE 600; 310; 30; 20 MG/100ML; MG/100ML; MG/100ML; MG/100ML
125 INJECTION, SOLUTION INTRAVENOUS CONTINUOUS
Status: SHIPPED | OUTPATIENT
Start: 2025-01-24

## 2025-01-24 RX ADMIN — PROPOFOL 40 MG: 10 INJECTION, EMULSION INTRAVENOUS at 11:04

## 2025-01-24 RX ADMIN — PROPOFOL 40 MG: 10 INJECTION, EMULSION INTRAVENOUS at 11:00

## 2025-01-24 RX ADMIN — PROPOFOL 40 MG: 10 INJECTION, EMULSION INTRAVENOUS at 11:05

## 2025-01-24 RX ADMIN — PROPOFOL 40 MG: 10 INJECTION, EMULSION INTRAVENOUS at 11:12

## 2025-01-24 RX ADMIN — PROPOFOL 120 MG: 10 INJECTION, EMULSION INTRAVENOUS at 10:58

## 2025-01-24 RX ADMIN — PROPOFOL 30 MG: 10 INJECTION, EMULSION INTRAVENOUS at 11:15

## 2025-01-24 RX ADMIN — SODIUM CHLORIDE, SODIUM LACTATE, POTASSIUM CHLORIDE, AND CALCIUM CHLORIDE 125 ML/HR: .6; .31; .03; .02 INJECTION, SOLUTION INTRAVENOUS at 10:37

## 2025-01-24 RX ADMIN — PROPOFOL 40 MG: 10 INJECTION, EMULSION INTRAVENOUS at 11:06

## 2025-01-24 RX ADMIN — PROPOFOL 40 MG: 10 INJECTION, EMULSION INTRAVENOUS at 11:08

## 2025-01-24 RX ADMIN — PROPOFOL 60 MG: 10 INJECTION, EMULSION INTRAVENOUS at 10:59

## 2025-01-24 RX ADMIN — PROPOFOL 30 MG: 10 INJECTION, EMULSION INTRAVENOUS at 11:18

## 2025-01-24 RX ADMIN — PROPOFOL 30 MG: 10 INJECTION, EMULSION INTRAVENOUS at 11:20

## 2025-01-24 RX ADMIN — PROPOFOL 40 MG: 10 INJECTION, EMULSION INTRAVENOUS at 11:02

## 2025-01-24 RX ADMIN — PROPOFOL 30 MG: 10 INJECTION, EMULSION INTRAVENOUS at 11:22

## 2025-01-24 RX ADMIN — PROPOFOL 40 MG: 10 INJECTION, EMULSION INTRAVENOUS at 11:10

## 2025-01-24 RX ADMIN — Medication 40 MG: at 11:17

## 2025-01-24 NOTE — ANESTHESIA POSTPROCEDURE EVALUATION
Post-Op Assessment Note    CV Status:  Stable  Pain Score: 0    Pain management: adequate       Mental Status:  Alert and awake   Hydration Status:  Euvolemic   PONV Controlled:  Controlled   Airway Patency:  Patent     Post Op Vitals Reviewed: Yes    No anethesia notable event occurred.    Staff: Anesthesiologist, CRNA           Last Filed PACU Vitals:  Vitals Value Taken Time   Temp     Pulse     BP     Resp     SpO2

## 2025-01-24 NOTE — INTERVAL H&P NOTE
H&P reviewed. After examining the patient I find no changes in the patients condition since the H&P had been written.    Vitals:    01/24/25 1024   BP: 117/74   Pulse: 84   Resp: 18   Temp: 97.7 °F (36.5 °C)   SpO2: 100%

## 2025-01-24 NOTE — ANESTHESIA PREPROCEDURE EVALUATION
"Procedure:  EGD  COLONOSCOPY     - denies any chest pain, palpitations, shortness of breath, syncope, lightheadedness, seizures   - denies any recent infectious symptoms such as fevers, chills, cough   - denies taking any anticoagulation medications or any issues with bleeding, bruising, clotting    Relevant Problems   CARDIO   (+) Chronic migraine without aura without status migrainosus, not intractable   (+) Migraine with aura and without status migrainosus, not intractable   (+) Migraine without aura and without status migrainosus, not intractable      GI/HEPATIC   (+) Gastroesophageal reflux disease      MUSCULOSKELETAL   (+) Reactive arthritis (HCC)      NEURO/PSYCH   (+) Chronic migraine without aura without status migrainosus, not intractable   (+) Generalized anxiety disorder   (+) Migraine with aura and without status migrainosus, not intractable   (+) Migraine without aura and without status migrainosus, not intractable   (+) Right arm weakness      Lab Results   Component Value Date    WBC 4.72 01/13/2025    HGB 12.0 01/13/2025    HCT 36.2 01/13/2025    MCV 93 01/13/2025     01/13/2025     Lab Results   Component Value Date    SODIUM 139 01/13/2025    K 3.9 01/13/2025     01/13/2025    CO2 28 01/13/2025    AGAP 7 01/13/2025    BUN 16 01/13/2025    CREATININE 0.85 01/13/2025    GLUC 96 12/02/2024    GLUF 94 01/13/2025    CALCIUM 8.8 01/13/2025    AST 18 01/13/2025    ALT 15 01/13/2025    ALKPHOS 28 (L) 01/13/2025    TP 6.4 01/13/2025    TBILI 0.53 01/13/2025    EGFR 81 01/13/2025     No results found for: \"PTT\"  No results found for: \"INR\", \"PROTIME\"    Physical Exam    Airway    Mallampati score: I  TM Distance: >3 FB  Neck ROM: full     Dental       Cardiovascular  Cardiovascular exam normal    Pulmonary  Pulmonary exam normal     Other Findings  post-pubertal.      Anesthesia Plan  ASA Score- 2     Anesthesia Type- IV sedation with anesthesia with ASA Monitors.         Additional Monitors:  "    Airway Plan:            Plan Factors-Exercise tolerance (METS): >4 METS.    Chart reviewed. EKG reviewed.  Existing labs reviewed. Patient summary reviewed.    Patient is not a current smoker.  Patient did not smoke on day of surgery.    Obstructive sleep apnea risk education given perioperatively.        Induction- intravenous.    Postoperative Plan-         Informed Consent- Anesthetic plan and risks discussed with patient.  I personally reviewed this patient with the CRNA. Discussed and agreed on the Anesthesia Plan with the CRNA..      NPO Status:  No vitals data found for the desired time range.

## 2025-01-28 ENCOUNTER — RESULTS FOLLOW-UP (OUTPATIENT)
Dept: GASTROENTEROLOGY | Facility: MEDICAL CENTER | Age: 50
End: 2025-01-28

## 2025-01-28 DIAGNOSIS — K59.00 CONSTIPATION, UNSPECIFIED CONSTIPATION TYPE: Primary | ICD-10-CM

## 2025-01-28 PROCEDURE — 88305 TISSUE EXAM BY PATHOLOGIST: CPT | Performed by: PATHOLOGY

## 2025-01-28 NOTE — RESULT ENCOUNTER NOTE
Please call the patient with the results    Please let her know that her stomach and esophagus biopsies were both negative for precancerous changes.  I recommend repeating an endoscopy in 5 years given her prior abnormal biopsies in the past.  Her small bowel biopsy showed nonspecific inflammation.  This can sometimes be related to an autoimmune disorder called celiac disease.  I have ordered blood work for additional testing, which she should try to have completed prior to her follow-up visit with our team.

## 2025-01-29 NOTE — TELEPHONE ENCOUNTER
----- Message from Diana Jaiyeola, MD sent at 1/29/2025 11:07 AM EST -----  Thank you for speaking with her.  No, she does not require additional testing for other autoimmune disorders.  Since it has been a few years since her last celiac test and with her abnormal biopsies I would recommend repeat blood work for up-to-date testing.  ----- Message -----  From: Mandi Shahid MA  Sent: 1/29/2025  10:40 AM EST  To: Diana M Jaiyeola, MD    ----- Message from Mandi Shahid MA sent at 1/29/2025 10:40 AM EST -----  FYI Dr. Jaiyeola, patient asking if there are other autoimmune issues to rule out or any additional testing needed. Patient also states she had the IgA testing done before and wanted to know if its possible that the results could change over time. Please advise, thank you!

## 2025-01-29 NOTE — TELEPHONE ENCOUNTER
Called the patient and gave the result report. Patient verbalized understanding and is agreeable to the celiac labwork. Patient was curious as to whether there is other autoimmune testing that may need to be ruled out, and states she has had this labwork done before in the past and wanted to know if its possible that it could change results. Forwarding to Dr. Jaiyeola to advise, thank you!

## 2025-01-29 NOTE — TELEPHONE ENCOUNTER
Called the patient and left a VM indicating the provider's response. Informed the patient to call us at the office with any additional questions or concerns they may have and provided callback number. Thank you!

## 2025-02-13 ENCOUNTER — DOCUMENTATION (OUTPATIENT)
Dept: GENETICS | Facility: CLINIC | Age: 50
End: 2025-02-13

## 2025-02-17 NOTE — PROGRESS NOTES
Pre-Test Genetic Counseling Consult Note    Patient Name: Dolores Mitchell   /Age: 1975/49 y.o.  Referring Provider: Diana M Jaiyeola, MD     Date of Service: 2025  Genetic Counselor: Shonda Coy MS, [facility]  Interpretation Services: None  Location: In-person consult at [facility]  Length of Visit: 60 Minutes    Dolores was referred to the [facility] Cancer Risk and Genetic Assessment Program due to her family history of melanoma and colon cancer among others . Her mother passed away 14+ years ago and was told that she had clinical diagnosis of Ward syndrome. She presents today to discuss the possibility of a hereditary cancer syndrome, options for genetic testing, and implications for her and her family.     Cancer History and Treatment:     Personal History: No personal history of cancer     Screening Hx:     Breast:  Breast Imaging: Annual mammogram; most recent 2025   Breast Biopsy: No prior breast biopsy   Breast Density: Heterogeneously dense    Colon:  Colonoscopy: Most recent colonoscopy was 25; no polyps; recommended to repeat in 5 years; history of IBS     Gynecologic:  Ovaries and Uterus intact     Reproductive History  Age at menarche: 13  Age at first live birth:  30  Menopause: Perimenopausal  Hormone replacement: None     Medical and Surgical History  Pertinent surgical history:   Past Surgical History:   Procedure Laterality Date    COLONOSCOPY  2019    HEMORRHOID SURGERY      GA ANRCT XM SURG REQ ANES GENERAL SPI/EDRL DX N/A 3/26/2020    Procedure: EXAM UNDER ANESTHESIA (EUA), ENDOANAL ULTRASOUND;  Surgeon: Maciej Chavez MD;  Location: [facility];  Service: Colorectal    GA SURG TX ANAL FISTULA SUBQ N/A 3/26/2020    Procedure: FISTULOTOMY;  Surgeon: Maciej Chavez MD;  Location: [facility];  Service: Colorectal    UPPER GASTROINTESTINAL ENDOSCOPY  2019    VARICOSE VEIN SURGERY        Pertinent medical history:  Past Medical History:   Diagnosis Date    Allergic  rhinitis 4/7/2015    Anal fissure     Anxiety     Batres's esophagus without dysplasia 5/20/2020    Family history of colon cancer in mother 10/19/2018    Gastritis 1/8/2020    GERD (gastroesophageal reflux disease)     History of COVID-19 12/2020    Migraine without aura and without status migrainosus, not intractable 5/17/2019    Reactive arthritis (HCC) 4/29/2021       Other History:  Height:   Ht Readings from Last 1 Encounters:   01/24/25 5' (1.524 m)     Weight:   Wt Readings from Last 1 Encounters:   01/24/25 55.8 kg (123 lb)     Relevant Family History   Patient reports no Ashkenazi Jainism ancestry.         Please refer to the scanned pedigree in the Media Tab for a complete family history     *All history is reported as provided by the patient; records are not available for review, except where indicated.     Assessment:  We discussed sporadic, familial and hereditary cancer.  We also discussed the many factors that influence our risk for cancer such as age, environmental exposures, lifestyle choices and family history.      We reviewed the indications suggestive of a hereditary predisposition to cancer.    Of note, While testing an affected family member is most informative, it is also appropriate to test unaffected family members who meet testing criteria (NCCN Guidelines for Genetic/Familial High-Risk Assessment: Breast, Ovarian, and Pancreatic).      Dolores's mother was diagnosed with stage IV colorectal cancer at the age of 60.  She passed away 14 years ago at the age of 63 with metastatic cancer.  Fourteen years ago, Dolores's mother was told that she has a clinical diagnosis of Ward syndrome however there is definitive germline test result from this time period.  Dolores's maternal grandfather also had a diagnosis of colorectal cancer in his 50's and her maternal aunt passed away from metastatic cancer at the age of 58 that was an unknown primary.  Her aunt's diagnosis was sudden and the cancer was already  widely metastatic so they were unable to determine a primary site.  Given this, it is unknown if this is a Ward syndrome related cancer or not.  Since both Dolores's mother and maternal grandfather were diagnosed with colon cancer over the age of 50, and her aunt's primary diagnosis is not known, she does not technically meet the NCCN guidelines for genetic testing.  However, if we consider that her mother carried a clinical diagnosis of Ward syndrome 14+ years ago when germline genetic testing was not readily available that she would meet NCCN V3.2024 Testing Criteria for the Evaluation of Ward syndrome.    The risks, benefits, and limitations of genetic testing were reviewed with the patient, as well as genetic discrimination laws, and possible test results (positive, negative, variants of uncertain significance) and their clinical implications. If positive for a mutation, options for managing cancer risk including increased surveillance, chemoprevention, and in some cases prophylactic surgery were discussed. Dolores was informed that if a hereditary cancer syndrome was identified in her, first degree relatives (parents, siblings, and children) have a chance of also inheriting the condition. Genetic testing would allow for predictive genetic testing in other relatives, who may also be at risk depending on their degree of relation.     Billing:  Most individuals pay <$100 for hereditary cancer genetic testing. If insurance covers the cost of the testing, individuals may still pay out of pocket secondary to co-pays, co-insurance, or deductibles. If the cost of the testing exceeds $100, the lab will reach out to the patient via phone or e-mail. The patient will then have the option to proceed with the testing, cancel the testing, or elect the self-pay option of $250. Dolores verbalized understanding.     Plan: Patient decided to proceed with testing and provided consent.    Summary:     Sample Collection:  The patient's  blood sample was drawn in the office on 2/18/25 by genetic counseling assistant Claudio Ambriz    Genetic Testing Preformed: CancerNext-Expanded + RNA (76 genes): AIP, ALK, APC, NICHOL, AXIN2, BAP1, BARD1, BMPR1A, BRCA1, BRCA2, BRIP1, CDC73, CDH1, CDK4, CDKN1B, CDKN2A, CEBPA, CHEK2, CTNNA1, DDX41, DICER1, EGFR, EPCAM, ETV6, FH, FLCN, GATA2, GREM1, HOXB13, KIT, LZTR1, MAX, MBD4, MEN1, MET, MITF, MLH1, MSH2, MSH3, MSH6, MUTYH, NF1, NF2, NTHL1, PALB2, PDGFRA, PHOX2B, PMS2, POLD1, POLE, POT1, OMELJ1L, PTCH1, PTEN, RAD51C, RAD51D, RB1, RET, RUNX1, SDHA, SDHAF2, SDHB, SDHC, SDHD, SMAD4, SMARCA4, SMARCB1, SMARCE1, STK11, SUFU, [id number], TP53, TSC1, TSC2, VHL, WT1     Result Call Information:  In the event that we need to reach Dolores via telephone:  I confirmed the patient's mobile number on file as the best number to call with results  I confirmed with the patient that we can leave a voicemail on the provided numbers    Results take approximately 2-3 weeks to complete once test is started.    Dolores will be notified via [facility] once results are available.      Additional recommendations for surveillance/medical management will be made pending genetic test results.

## 2025-02-18 ENCOUNTER — OFFICE VISIT (OUTPATIENT)
Dept: GENETICS | Facility: CLINIC | Age: 50
End: 2025-02-18
Payer: COMMERCIAL

## 2025-02-18 DIAGNOSIS — Z80.0 FAMILY HISTORY OF LYNCH SYNDROME: Primary | ICD-10-CM

## 2025-02-18 DIAGNOSIS — Z80.0 FAMILY HISTORY OF COLON CANCER: ICD-10-CM

## 2025-02-18 PROCEDURE — 96041 GENETIC COUNSELING SVC EA 30: CPT | Performed by: GENETIC COUNSELOR, MS

## 2025-02-18 PROCEDURE — 36415 COLL VENOUS BLD VENIPUNCTURE: CPT

## 2025-02-27 DIAGNOSIS — F40.243 ANXIETY WITH FLYING: Primary | ICD-10-CM

## 2025-02-27 RX ORDER — LORAZEPAM 0.5 MG/1
0.5 TABLET ORAL DAILY PRN
Qty: 5 TABLET | Refills: 0 | Status: SHIPPED | OUTPATIENT
Start: 2025-02-27

## 2025-03-18 LAB
GENE DIS ANL INTERP-IMP: NORMAL
INTERPRETATION: NORMAL

## 2025-03-27 ENCOUNTER — TELEPHONE (OUTPATIENT)
Dept: GENETICS | Facility: CLINIC | Age: 50
End: 2025-03-27

## 2025-03-27 NOTE — TELEPHONE ENCOUNTER
Post-Test Genetic Counseling Consult Note    Today I left a voicemail for Dolores reviewing the results of her genetic test for hereditary cancer. Dolores met previously with Shonda Cyo on 2/18/25 for pre-test counseling.  I encouraged Dolores to call [phone] to discuss this result further. A copy of this consult note and genetic test result will be shared with the patient.      SUMMARY:    Test(s): CancerNext-Expanded + RNA (76 genes): AIP, ALK, APC, NICHOL, AXIN2, BAP1, BARD1, BMPR1A, BRCA1, BRCA2, BRIP1, CDC73, CDH1, CDK4, CDKN1B, CDKN2A, CEBPA, CHEK2, CTNNA1, DDX41, DICER1, EGFR, EPCAM, ETV6, FH, FLCN, GATA2, GREM1, HOXB13, KIT, LZTR1, MAX, MBD4, MEN1, MET, MITF, MLH1, MSH2, MSH3, MSH6, MUTYH, NF1, NF2, NTHL1, PALB2, PDGFRA, PHOX2B, PMS2, POLD1, POLE, POT1, UBJTL3M, PTCH1, PTEN, RAD51C, RAD51D, RB1, RET, RUNX1, SDHA, SDHAF2, SDHB, SDHC, SDHD, SMAD4, SMARCA4, SMARCB1, SMARCE1, STK11, SUFU, [id number], TP53, TSC1, TSC2, VHL, WT1     Result: Variant of uncertain significance     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 The significance of the TSC2 variant is currently not known and therefore this test result cannot be used to help determine Dolores cancer risks.      It is possible that the variant was seen in only a handful of individuals, or there may be conflicting or incomplete information in the medical literature about the variant and its association with hereditary cancer.     The laboratory will continue to accumulate information on this variant and will reclassify it as either a positive or negative genetic test result when they are confident that they have adequate information. We will notify Dolores as updated information is obtained. It is important to  note that the majority of variants of uncertain significance are reclassified as likely benign or benign as additional information about the variant becomes available.     Risk Based on Family History:  The significance of this variant is currently not known and therefore this test result cannot be used to help determine Dolores cancer risks. Rather her personal medical history and family history of cancer are the most important factors used to estimate her risk for developing certain cancers and to direct her medical management.      Compared with the general population risk of approximately 4%, empiric data suggest that Dolores's risk to develop colorectal cancer by age 79 is approximately 9% given one first-degree relative with colorectal cancer.     Dolores may also have an elevated lifetime risk to develop melanoma compared to the general population based on the reported family history. However, the magnitude of this risk is unknown.     Risks and Testing for Family Members:  Genetic testing for this variant is not recommended for relatives who wish to determine their cancer risks for purposes of determining medical management. The presence or absence of this variant in a relative is not clinically meaningful unless the variant is reclassified in the future.     Despite this result, Dolores's first-degree relatives may be at increased risk for the cancers based on the family history. We recommend they discuss screening and management recommendations with their healthcare providers.    If Dolores has any affected family members with a cancer diagnosis, especially at a young age, they may still consider genetic testing. Relatives who wish to pursue genetic testing can reach out to the [facility] [phone] to schedule an appointment or visit [email or web address] to identify a local genetic counselor.     Additional Information:  A healthy lifestyle will improve overall health and reduce risk for illness.  Eating a healthy diet and exercising for 4 hours per week is recommended. Both diet and exercise have been shown to help maintain a healthy weight. Postmenopausal women who are overweight are at higher risk for breast cancer. Moderate to heavy alcohol use can increase the risk for some cancers. Smoking cigarettes can also increase risk for breast, lung, prostate, pancreatic and other cancers.      Plan:   Consider regular skin examinations by a healthcare provider based on your family history of skin cancer.      Otherwise, there are no additional recommendations based on Dolores's result. she should continue cancer screening and medical management as clinically indicated and as determined appropriate by her healthcare providers.    VUS Result: Dolores was strongly encouraged to contact us regarding any changes in her personal or family history of cancer as these changes could alter our recommendation regarding genetic testing and/or cancer screening. Dolores was also encouraged to follow up with us on an annual basis as variant classifications are subject to change.

## 2025-04-03 ENCOUNTER — RESULTS FOLLOW-UP (OUTPATIENT)
Dept: GENETICS | Facility: CLINIC | Age: 50
End: 2025-04-03

## 2025-04-09 NOTE — TELEPHONE ENCOUNTER
Spoke to patient  With the MRI I ordered, are you able to see if that includes the neck region too?  She is having radicular pain with N/T down the arm too so Im not sure if I have to put in a separate order Yes

## 2025-04-11 ENCOUNTER — PROCEDURE VISIT (OUTPATIENT)
Dept: NEUROLOGY | Facility: CLINIC | Age: 50
End: 2025-04-11
Payer: COMMERCIAL

## 2025-04-11 VITALS — DIASTOLIC BLOOD PRESSURE: 73 MMHG | HEART RATE: 81 BPM | SYSTOLIC BLOOD PRESSURE: 115 MMHG | TEMPERATURE: 98.7 F

## 2025-04-11 DIAGNOSIS — G43.709 CHRONIC MIGRAINE WITHOUT AURA WITHOUT STATUS MIGRAINOSUS, NOT INTRACTABLE: Primary | ICD-10-CM

## 2025-04-11 PROCEDURE — 64615 CHEMODENERV MUSC MIGRAINE: CPT | Performed by: PHYSICIAN ASSISTANT

## 2025-04-11 NOTE — PROGRESS NOTES
Universal Protocol   Consent: Verbal consent obtained. Written consent obtained.  Risks and benefits: risks, benefits and alternatives were discussed  Consent given by: patient  Patient understanding: patient states understanding of the procedure being performed  Patient consent: the patient's understanding of the procedure matches consent given  Procedure consent: procedure consent matches procedure scheduled      Chemodenervation     Date/Time  4/11/2025 10:30 AM     Performed by  Dorie Priest PA-C   Authorized by  Dorie Priest PA-C     Pre-procedure details      Prepped With: Alcohol     Procedure details      Position:  Upright   Botox      Botox Type:  Type A    Brand:  Botox    mL's of Botulinum Toxin:  200    Final Concentration per CC:  100 units    Needle Gauge:  30 G 2.5 inch   Procedures      Botox Procedures: chronic headache      Indications: migraines     Injection Location      Head / Face:  L superior trapezius, R superior trapezius, L superior cervical paraspinal, R superior cervical paraspinal, L , R , procerus, L temporalis, R temporalis, R frontalis, L frontalis, R medial occipitalis and L medial occipitalis    L  injection amount:  5 unit(s)    R  injection amount:  5 unit(s)    L lateral frontalis:  5 unit(s)    R lateral frontalis:  5 unit(s)    L medial frontalis:  0 unit(s)    R medial frontalis:  0 unit(s)    Comments:  No u    L temporalis injection amount:  20 unit(s)    R temporalis injection amount:  20 unit(s)    Procerus injection amount:  5 unit(s)    L medial occipitalis injection amount:  15 unit(s)    R medial occipitalis injection amount:  15 unit(s)    L superior cervical paraspinal injection amount:  10 unit(s)    R superior cervical paraspinal injection amount:  10 unit(s)    L superior trapezius injection amount:  15 unit(s)    R superior trapezius injection amount:  15 unit(s)   Total Units      Total units used:  200    Total  units discarded:  0   Post-procedure details      Chemodenervation:  Chronic migraine    Facial Nerve Location::  Bilateral facial nerve    Patient tolerance of procedure:  Tolerated well, no immediate complications   Comments       Extra units medically necessary:  10 units between each splenius capitis  Parietal regions b/l- 10 each side (20 total)  Orbicularis oculi bilaterally- 2.5 each side (5 total)  20 units between both middle traps (R>L)     No units medial frontalis as above.  Pt supine for front injections, otherwise sitting upright.       Blood pressure 115/73, pulse 81, temperature 98.7 °F (37.1 °C), temperature source Temporal.

## 2025-07-10 DIAGNOSIS — F40.243 ANXIETY WITH FLYING: ICD-10-CM

## 2025-07-10 DIAGNOSIS — K31.A0 INTESTINAL METAPLASIA OF GASTRIC MUCOSA: ICD-10-CM

## 2025-07-10 RX ORDER — OMEPRAZOLE 40 MG/1
40 CAPSULE, DELAYED RELEASE ORAL DAILY
Qty: 90 CAPSULE | Refills: 1 | Status: SHIPPED | OUTPATIENT
Start: 2025-07-10

## 2025-07-10 NOTE — TELEPHONE ENCOUNTER
Patient called to confirm that this prescription will be sent to [id number] - [place], [place] - [address]. Please send in to correct pharmacy.

## 2025-07-10 NOTE — TELEPHONE ENCOUNTER
Patient called in regards to she will be traveling Saturday and would like to know if provider would be able to send in Ativan.

## 2025-07-11 ENCOUNTER — PROCEDURE VISIT (OUTPATIENT)
Dept: NEUROLOGY | Facility: CLINIC | Age: 50
End: 2025-07-11
Payer: COMMERCIAL

## 2025-07-11 VITALS — TEMPERATURE: 97.8 F

## 2025-07-11 DIAGNOSIS — G43.709 CHRONIC MIGRAINE WITHOUT AURA WITHOUT STATUS MIGRAINOSUS, NOT INTRACTABLE: Primary | ICD-10-CM

## 2025-07-11 PROCEDURE — 64615 CHEMODENERV MUSC MIGRAINE: CPT | Performed by: PHYSICIAN ASSISTANT

## 2025-07-11 RX ORDER — LORAZEPAM 0.5 MG/1
0.5 TABLET ORAL DAILY PRN
Qty: 5 TABLET | Refills: 0 | Status: SHIPPED | OUTPATIENT
Start: 2025-07-11

## 2025-07-11 NOTE — PROGRESS NOTES
Universal Protocol   Consent: Verbal consent obtained. Written consent obtained  Risks and benefits: risks, benefits and alternatives were discussed  Consent given by: patient  Patient understanding: patient states understanding of the procedure being performed  Patient consent: the patient's understanding of the procedure matches consent given  Procedure consent: procedure consent matches procedure scheduled      Chemodenervation     Date/Time  7/11/2025 10:30 AM     Performed by  Dorie Priest PA-C   Authorized by  Dorie Priest PA-C     Pre-procedure details      Prepped With: Alcohol     Procedure details      Position:  Upright   Botox      Botox Type:  Type A    Brand:  Botox    mL's of Botulinum Toxin:  200    Final Concentration per CC:  100 units    Needle Gauge:  30 G 2.5 inch   Procedures      Botox Procedures: chronic headache      Indications: migraines     Injection Location      Head / Face:  L superior trapezius, R superior trapezius, L superior cervical paraspinal, R superior cervical paraspinal, L , R , procerus, L temporalis, R temporalis, R frontalis, L frontalis, R medial occipitalis and L medial occipitalis    L  injection amount:  5 unit(s)    R  injection amount:  5 unit(s)    L lateral frontalis:  5 unit(s)    R lateral frontalis:  5 unit(s)    L medial frontalis:  0 unit(s)    R medial frontalis:  0 unit(s)    Comments:  No U    L temporalis injection amount:  20 unit(s)    R temporalis injection amount:  20 unit(s)    Procerus injection amount:  5 unit(s)    L medial occipitalis injection amount:  15 unit(s)    R medial occipitalis injection amount:  15 unit(s)    L superior cervical paraspinal injection amount:  10 unit(s)    R superior cervical paraspinal injection amount:  10 unit(s)    L superior trapezius injection amount:  15 unit(s)    R superior trapezius injection amount:  15 unit(s)   Total Units      Total units used:  200    Total  units discarded:  0   Post-procedure details      Chemodenervation:  Chronic migraine    Facial Nerve Location::  Bilateral facial nerve    Patient tolerance of procedure:  Tolerated well, no immediate complications   Comments       Extra units medically necessary:  10 units between each splenius capitis  Parietal regions b/l- 10 each side (20 total)  Orbicularis oculi bilaterally- 2.5 each side (5 total)  20 units between both middle traps (R>L)     No units medial frontalis as above.  Pt supine for front injections, otherwise sitting upright.       Temperature 97.8 °F (36.6 °C), temperature source Temporal.

## 2025-08-06 ENCOUNTER — OFFICE VISIT (OUTPATIENT)
Dept: GASTROENTEROLOGY | Facility: MEDICAL CENTER | Age: 50
End: 2025-08-06

## 2025-08-06 VITALS
HEIGHT: 60 IN | BODY MASS INDEX: 24.15 KG/M2 | TEMPERATURE: 98.2 F | OXYGEN SATURATION: 98 % | DIASTOLIC BLOOD PRESSURE: 78 MMHG | SYSTOLIC BLOOD PRESSURE: 118 MMHG | HEART RATE: 86 BPM | WEIGHT: 123 LBS

## 2025-08-06 DIAGNOSIS — G43.009 MIGRAINE WITHOUT AURA AND WITHOUT STATUS MIGRAINOSUS, NOT INTRACTABLE: ICD-10-CM

## 2025-08-06 DIAGNOSIS — K31.A0 GASTRIC INTESTINAL METAPLASIA: ICD-10-CM

## 2025-08-06 DIAGNOSIS — Z80.0 FAMILY HISTORY OF COLON CANCER IN MOTHER: ICD-10-CM

## 2025-08-06 DIAGNOSIS — K22.70 BARRETT'S ESOPHAGUS WITHOUT DYSPLASIA: ICD-10-CM

## 2025-08-06 DIAGNOSIS — K59.09 OTHER CONSTIPATION: ICD-10-CM

## 2025-08-06 DIAGNOSIS — K21.9 GASTROESOPHAGEAL REFLUX DISEASE, UNSPECIFIED WHETHER ESOPHAGITIS PRESENT: Primary | ICD-10-CM

## 2025-08-06 RX ORDER — OMEPRAZOLE 20 MG/1
20 CAPSULE, DELAYED RELEASE ORAL DAILY
Qty: 90 CAPSULE | Refills: 3 | Status: SHIPPED | OUTPATIENT
Start: 2025-08-06

## 2025-08-07 RX ORDER — AMITRIPTYLINE HYDROCHLORIDE 10 MG/1
20 TABLET ORAL
Qty: 180 TABLET | Refills: 1 | Status: SHIPPED | OUTPATIENT
Start: 2025-08-07

## (undated) DEVICE — SUT PDS II 4-0 RB-1 27 IN Z304H

## (undated) DEVICE — BASIC SINGLE BASIN 2-LF: Brand: MEDLINE INDUSTRIES, INC.

## (undated) DEVICE — 3000CC GUARDIAN II: Brand: GUARDIAN

## (undated) DEVICE — TUBING SUCTION 5MM X 12 FT

## (undated) DEVICE — MEDI-VAC YANK SUCT HNDL W/TPRD BULBOUS TIP: Brand: CARDINAL HEALTH

## (undated) DEVICE — ELECTRODE NEEDLE MOD E-Z CLEAN 2.75IN 7CM -0013M

## (undated) DEVICE — SYRINGE 20ML LL

## (undated) DEVICE — IV CATH 18 G X 1.16 IN

## (undated) DEVICE — SPONGE STICK WITH PVP-I: Brand: KENDALL

## (undated) DEVICE — GLOVE SRG BIOGEL 8

## (undated) DEVICE — HYDROGEN PEROXIDE 3 PCT 16 OZ

## (undated) DEVICE — INTENDED FOR TISSUE SEPARATION, AND OTHER PROCEDURES THAT REQUIRE A SHARP SURGICAL BLADE TO PUNCTURE OR CUT.: Brand: BARD-PARKER SAFETY BLADES SIZE 15, STERILE

## (undated) DEVICE — GLOVE INDICATOR PI UNDERGLOVE SZ 8.5 BLUE

## (undated) DEVICE — SYRINGE 10ML LL

## (undated) DEVICE — PENCIL ELECTROSURG E-Z CLEAN -0035H

## (undated) DEVICE — BETHLEHEM UNIVERSAL MINOR GEN: Brand: CARDINAL HEALTH

## (undated) DEVICE — SUT VICRYL 3-0 REEL 54 IN J285G

## (undated) DEVICE — LUBRICANT SURGILUBE TUBE 4 OZ  FLIP TOP

## (undated) DEVICE — NEEDLE 25G X 1 1/2

## (undated) DEVICE — DISPOSABLE BRIEF/UNDERWEAR

## (undated) DEVICE — GAUZE SPONGES,16 PLY: Brand: CURITY

## (undated) DEVICE — SUT CHROMIC 3-0 SH 27 IN G122H